# Patient Record
Sex: FEMALE | Race: WHITE | NOT HISPANIC OR LATINO | Employment: FULL TIME | URBAN - METROPOLITAN AREA
[De-identification: names, ages, dates, MRNs, and addresses within clinical notes are randomized per-mention and may not be internally consistent; named-entity substitution may affect disease eponyms.]

---

## 2017-01-12 ENCOUNTER — OFFICE VISIT (OUTPATIENT)
Dept: MEDICAL GROUP | Facility: MEDICAL CENTER | Age: 42
End: 2017-01-12
Payer: COMMERCIAL

## 2017-01-12 ENCOUNTER — PATIENT MESSAGE (OUTPATIENT)
Dept: MEDICAL GROUP | Facility: MEDICAL CENTER | Age: 42
End: 2017-01-12

## 2017-01-12 VITALS
HEART RATE: 76 BPM | RESPIRATION RATE: 16 BRPM | DIASTOLIC BLOOD PRESSURE: 62 MMHG | TEMPERATURE: 98.4 F | WEIGHT: 177 LBS | OXYGEN SATURATION: 95 % | BODY MASS INDEX: 25.34 KG/M2 | HEIGHT: 70 IN | SYSTOLIC BLOOD PRESSURE: 112 MMHG

## 2017-01-12 DIAGNOSIS — J02.9 SORE THROAT: ICD-10-CM

## 2017-01-12 DIAGNOSIS — J00 ACUTE NASOPHARYNGITIS: ICD-10-CM

## 2017-01-12 LAB
INT CON NEG: NEGATIVE
INT CON POS: POSITIVE
S PYO AG THROAT QL: NEGATIVE

## 2017-01-12 PROCEDURE — 87880 STREP A ASSAY W/OPTIC: CPT | Performed by: FAMILY MEDICINE

## 2017-01-12 PROCEDURE — 99214 OFFICE O/P EST MOD 30 MIN: CPT | Performed by: FAMILY MEDICINE

## 2017-01-12 RX ORDER — IPRATROPIUM BROMIDE 42 UG/1
2 SPRAY, METERED NASAL 3 TIMES DAILY
Qty: 1 BOTTLE | Refills: 1 | Status: SHIPPED | OUTPATIENT
Start: 2017-01-12 | End: 2017-04-12

## 2017-01-12 NOTE — MR AVS SNAPSHOT
"Marcela Tiwari   2017 4:40 PM   Office Visit   MRN: [id number]    Department:  [facility]   Dept Phone:  [phone]    Description:  Female : 1975   Provider:  Ivan Armstrong M.D.           Reason for Visit     Pharyngitis           Allergies as of 2017     Allergen Noted Reactions    Seasonal 2015   Runny Nose    Seasonal allergies      You were diagnosed with     Sore throat   [[id number]]         Vital Signs     Blood Pressure Pulse Temperature Respirations Height Weight    112/62 mmHg 76 36.9 °C (98.4 °F) 16 1.778 m (5' 10\") 80.287 kg (177 lb)    Body Mass Index Oxygen Saturation Breastfeeding? 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   10/1/2014 - Present    S/P BKA (below knee amputation) unilateral (HCC) Z89.519   10/1/2014 - Present    Migraines G43.909   10/1/2014 - Present    Chronic rhinitis J31.0   2015 - Present      Health Maintenance        Date Due Completion Dates    IMM DTaP/Tdap/Td Vaccine (1 - Tdap) 1996 ---    IMM INFLUENZA (1) 2016 ---    PAP SMEAR 3/7/2019 3/7/2016, 3/7/2016, 10/23/2014            Results     POCT Rapid Strep A      Component    Rapid Strep Screen    NEGATIVE    Internal Control Positive    Positive    Internal Control Negative    Negative                        Current Immunizations     No immunizations on file.      Below and/or attached are the medications your provider expects you to take. Review all of your home medications and newly ordered medications with your provider and/or pharmacist. Follow medication instructions as directed by your provider and/or pharmacist. Please keep your medication list with you and share with your provider. Update the information " when medications are discontinued, doses are changed, or new medications (including over-the-counter products) are added; and carry medication information at all times in the event of emergency situations     Allergies:  SEASONAL - Runny Nose               Medications  Valid as of: January 12, 2017 -  4:48 PM    Generic Name Brand Name Tablet Size Instructions for use    Eszopiclone (Tab) LUNESTA 1 MG Take 1 Tab by mouth at bedtime as needed for Insomnia.        Fluticasone Propionate (Suspension) FLONASE 50 MCG/ACT Spray 2 Sprays in nose every day.        Montelukast Sodium (Tab) SINGULAIR 10 MG Take 1 Tab by mouth every day.        SUMAtriptan Succinate (Tab) IMITREX 50 MG Take 1 Tab by mouth 1 time daily as needed for Migraine.        .                 Medicines prescribed today were sent to:     [facility]ACY #[id number] - [place], [place] - [address]    [address] [place] [address]    Phone: [phone] Fax: [phone]    Open 24 Hours?: No      Medication refill instructions:       If your prescription bottle indicates you have medication refills left, it is not necessary to call your provider’s office. Please contact your pharmacy and they will refill your medication.    If your prescription bottle indicates you do not have any refills left, you may request refills at any time through one of the following ways: The online [employer] system (except Urgent Care), by calling your provider’s office, or by asking your pharmacy to contact your provider’s office with a refill request. Medication refills are processed only during regular business hours and may not be available until the next business day. Your provider may request additional information or to have a follow-up visit with you prior to refilling your medication.   *Please Note: Medication refills are assigned a new Rx number when refilled electronically. Your pharmacy may indicate that no refills were authorized even though a new prescription for the  same medication is available at the pharmacy. Please request the medicine by name with the pharmacy before contacting your provider for a refill.           MyChart Access Code: Activation code not generated  Current MyChart Status: Active

## 2017-01-12 NOTE — TELEPHONE ENCOUNTER
From: Marcela Tiwari  To: Ivan Armstrong M.D.  Sent: 1/12/2017 11:30 AM PST  Subject: Procedure Question    Dear Dr. Armstrong,     I was wondering if you are in office today and if walk ins are welcomed . I have been sick for the 2nd day. No fever but my throat hurts so much . Plus , headache and overall body aches. I am vivienne to have opportunity to stay home but i was wondering if i need to get to the office and get checked. Please , let me know . Sincerely. Marcela

## 2017-01-13 NOTE — PROGRESS NOTES
"Chief Complaint   Patient presents with   • Pharyngitis       HPI: 2 days of illness including: rhinorrhea, started with sore throat, facial pain, diarrhea, positive clearing throat Mucus is: thin  Symptoms negative for fever, chills, night sweats, cough, chest pain, wheezing, vomiting, ear pain   Similarly ill exposures: yes, works at the [employer]  Medical history negative for recurrent OM, tonsillitis, sinusitis, asthma, bronchitis, pneumonia.   She  reports that she has never smoked. She has never used smokeless tobacco.  Patient has been drinking tea with honey and taking vitamin C    Strep negative.    ROS  No fever. No productive cough. No skin rashes.  No N/V/D    Blood pressure 112/62, pulse 76, temperature 36.9 °C (98.4 °F), resp. rate 16, height 1.778 m (5' 10\"), weight 80.287 kg (177 lb), SpO2 95 %, not currently breastfeeding.  Gen: alert, No conversational dyspnea. No audible wheeze, nontoxic.  PERRL, conjunctiva slightly injected. No photophobia. No eye discharge.  Ears: normal pinnae. TM: normal  Nares patent with thin mucus.  Sinuses non tender over maxillary / frontal sinuses  Throat: erythematous injection. No exudate.   Neck: supple, with  no adenopathy  Lungs:  clear to auscultation, no wheezing, no retraction, no stridor, good air exchange.   Abdomen soft. No HSM.   Skin: warm and dry. No rash.    A/P    1. Acute nasopharyngitis  Mild viral URI. Advised rest, hydration, vitamin C, zinc.  Prescription for ipratropium nasal to help with symptom control.  - ipratropium (ATROVENT) 0.06 % Solution; Spray 2 Sprays in nose 3 times a day.  Dispense: 1 Bottle; Refill: 1    2. Sore throat  Negative for strep.  - POCT Rapid Strep A        Followup for worsening symptoms, difficulty breathing, lack of expected recovery, or should new symptoms or problems arise.    "

## 2017-01-26 ENCOUNTER — PATIENT MESSAGE (OUTPATIENT)
Dept: MEDICAL GROUP | Facility: MEDICAL CENTER | Age: 42
End: 2017-01-26

## 2017-01-26 DIAGNOSIS — R10.9 ABDOMINAL PAIN, UNSPECIFIED LOCATION: ICD-10-CM

## 2017-03-23 ENCOUNTER — GYNECOLOGY VISIT (OUTPATIENT)
Dept: OBGYN | Facility: MEDICAL CENTER | Age: 42
End: 2017-03-23
Payer: COMMERCIAL

## 2017-03-23 ENCOUNTER — HOSPITAL ENCOUNTER (OUTPATIENT)
Facility: MEDICAL CENTER | Age: 42
End: 2017-03-23
Attending: OBSTETRICS & GYNECOLOGY
Payer: COMMERCIAL

## 2017-03-23 VITALS
SYSTOLIC BLOOD PRESSURE: 82 MMHG | WEIGHT: 178 LBS | BODY MASS INDEX: 25.48 KG/M2 | DIASTOLIC BLOOD PRESSURE: 54 MMHG | HEIGHT: 70 IN

## 2017-03-23 DIAGNOSIS — Z12.4 ROUTINE CERVICAL SMEAR: ICD-10-CM

## 2017-03-23 DIAGNOSIS — Z11.3 SCREENING EXAMINATION FOR VENEREAL DISEASE: ICD-10-CM

## 2017-03-23 DIAGNOSIS — Z01.419 WELL WOMAN EXAM: ICD-10-CM

## 2017-03-23 DIAGNOSIS — Z11.51 SPECIAL SCREENING EXAMINATION FOR HUMAN PAPILLOMAVIRUS (HPV): ICD-10-CM

## 2017-03-23 PROCEDURE — 99395 PREV VISIT EST AGE 18-39: CPT | Performed by: OBSTETRICS & GYNECOLOGY

## 2017-03-23 PROCEDURE — 88175 CYTOPATH C/V AUTO FLUID REDO: CPT

## 2017-03-23 PROCEDURE — 87491 CHLMYD TRACH DNA AMP PROBE: CPT

## 2017-03-23 PROCEDURE — 87624 HPV HI-RISK TYP POOLED RSLT: CPT

## 2017-03-23 PROCEDURE — 87591 N.GONORRHOEAE DNA AMP PROB: CPT

## 2017-03-23 NOTE — PROGRESS NOTES
"Marcela Tiwari is a 41 y.o. y.o. female who presents for her Gynecologic Exam        HPI Comments: Pt presents for well woman exam. Pt has complaints of being tired and very fatigue. No LMP recorded.  .  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     Past Surgical History   Procedure Laterality Date   • Bunionectomy     • Amputation, below the knee  2011     Seasonal  Social History     Social History   • Marital Status: Single     Spouse Name: N/A   • Number of Children: N/A   • Years of Education: N/A     Social History Main Topics   • Smoking status: Never Smoker    • Smokeless tobacco: Never Used   • Alcohol Use: 2.0 oz/week     4 Glasses of wine per week   • Drug Use: No   • Sexual Activity:     Partners: Male     Other Topics Concern   • None     Social History Narrative     Family History   Problem Relation Age of Onset   • Other Mother 48     Parkinson's   • Cancer Paternal Grandmother      breast cancer     Medications:   Current Outpatient Prescriptions Ordered in [facility]   Medication Sig Dispense Refill   • ipratropium (ATROVENT) 0.06 % Solution Spray 2 Sprays in nose 3 times a day. 1 Bottle 1   • eszopiclone (LUNESTA) 1 MG Tab tablet Take 1 Tab by mouth at bedtime as needed for Insomnia. 30 Tab 1   • montelukast (SINGULAIR) 10 MG Tab Take 1 Tab by mouth every day. 30 Tab 1   • fluticasone (FLONASE) 50 MCG/ACT nasal spray Spray 2 Sprays in nose every day. 16 g 5   • sumatriptan (IMITREX) 50 MG Tab Take 1 Tab by mouth 1 time daily as needed for Migraine. 10 Tab 5     No current Epic-ordered facility-administered medications on file.          Objective:   Vital measurements:  Blood pressure 82/54, height 1.778 m (5' 10\"), weight 80.74 kg (178 " lb).  Body mass index is 25.54 kg/(m^2). (Goal BM I>18 <25)    Physical Exam   Nursing note and vitals reviewed.  Constitutional: She is oriented to person, place, and time. She appears well-developed and well-nourished. No distress.     HEENT:   Head: Normocephalic and atraumatic.   Right Ear: External ear normal.   Left Ear: External ear normal.   Nose: Nose normal.   Eyes: Conjunctivae and EOM are normal. Pupils are equal, round, and reactive to light. No scleral icterus.     Neck: Normal range of motion. Neck supple. No tracheal deviation present. No thyromegaly present.     Pulmonary/Chest: Effort normal and breath sounds normal. No respiratory distress. She has no wheezes. She has no rales. She exhibits no tenderness.     Cardiovascular: Regular, rate and rhythm. No JVD.    Abdominal: Soft. Bowel sounds are normal. She exhibits no distension and no mass. No tenderness. She has no rebound and no guarding.     Breast:  Symmetrical, normal consistency without masses., No dimpling or skin changes, Normal nipples without discharge, no axillary lymphadenopathy, negative    Genitourinary:  Pelvic exam was performed with patient supine.  External genitalia with no abnormal pigmentation, labial fusion,rash, tenderness, lesion or injury to the labia bilaterally.  Vagina is moist with no lesions, foul discharge, erythema, tenderness or bleeding. No foreign body around the vagina or signs of injury.   Cervix exhibits no motion tenderness, no discharge and no friability.   Uterus is AV not deviated, not enlarged, not fixed and not tender.  Right adnexum displays no mass, no tenderness and no fullness. Left adnexum displays no mass, no tenderness and no fullness.     Musculoskeletal: Normal range of motion. She exhibits no edema and no tenderness.     Lymphadenopathy: She has no cervical adenopathy.     Neurological: She is alert and oriented to person, place, and time. She exhibits normal muscle tone.     Skin: Skin is  warm and dry. No rash noted. She is not diaphoretic. No erythema. No pallor.     Psychiatric: She has a normal mood and affect. Her behavior is normal. Judgment and thought content normal.               Assessment:     1. Well woman exam  THINPREP PAP W/HPV AND CTNG   2. Routine cervical smear  THINPREP PAP W/HPV AND CTNG   3. Special screening examination for human papillomavirus (HPV)  THINPREP PAP W/HPV AND CTNG   4. Screening examination for venereal disease  THINPREP PAP W/HPV AND CTNG         Plan:   Pap and physical exam performed  Monthly SBE.  Counseling: breast self exam, mammography screening, STD prevention, HIV risk factors and prevention and menopause  Encourage exercise and proper diet.  Mammograms starting @ age 40 annually.  See medications and orders placed in encounter report.  Pt needs to make a referral to see primary care for fatigue with h/o cancer.

## 2017-03-23 NOTE — MR AVS SNAPSHOT
"        Mracela Tiwari   3/23/2017 9:00 AM   Gynecology Visit   MRN: [id number]    Department:  [facility]   Dept Phone:  [phone]    Description:  Female : 1975   Provider:  Grace Andre M.D.           Reason for Visit     Gynecologic Exam Annual exam       Allergies as of 3/23/2017     Allergen Noted Reactions    Seasonal 2015   Runny Nose    Seasonal allergies      You were diagnosed with     Well woman exam   [650636]       Routine cervical smear   [288829]       Special screening examination for human papillomavirus (HPV)   [V73.81.ICD-9-CM]       Screening examination for venereal disease   [V74.5.ICD-9-CM]         Vital Signs     Blood Pressure Height Weight Body Mass Index Smoking Status       82/54 mmHg 1.778 m (5' 10\") 80.74 kg (178 lb) 25.54 kg/m2 Never Smoker        Basic Information     Date Of Birth Sex Race Ethnicity Preferred Language    1975 Female White Non- English      Problem List              ICD-10-CM Priority Class Noted - Resolved    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      Below and/or attached are the medications your provider expects you to take. Review all of your home medications and newly ordered medications with your provider and/or pharmacist. Follow medication instructions as directed by your provider and/or pharmacist. Please keep your medication list with you and share with your provider. Update the information when medications are discontinued, doses are changed, or new medications " (including over-the-counter products) are added; and carry medication information at all times in the event of emergency situations     Allergies:  SEASONAL - Runny Nose               Medications  Valid as of: March 23, 2017 -  9:53 AM    Generic Name Brand Name Tablet Size Instructions for use    Eszopiclone (Tab) LUNESTA 1 MG Take 1 Tab by mouth at bedtime as needed for Insomnia.        Fluticasone Propionate (Suspension) FLONASE 50 MCG/ACT Spray 2 Sprays in nose every day.        Ipratropium Bromide (Solution) ATROVENT 0.06 % Spray 2 Sprays in nose 3 times a day.        Montelukast Sodium (Tab) SINGULAIR 10 MG Take 1 Tab by mouth every day.        SUMAtriptan Succinate (Tab) IMITREX 50 MG Take 1 Tab by mouth 1 time daily as needed for Migraine.        .                 Medicines prescribed today were sent to:     [facility]ARMACY #[id number] - [place], [place] - [address]    [address] [place] [address]    Phone: [phone] Fax: [phone]    Open 24 Hours?: No      Medication refill instructions:       If your prescription bottle indicates you have medication refills left, it is not necessary to call your provider’s office. Please contact your pharmacy and they will refill your medication.    If your prescription bottle indicates you do not have any refills left, you may request refills at any time through one of the following ways: The online [employer] system (except Urgent Care), by calling your provider’s office, or by asking your pharmacy to contact your provider’s office with a refill request. Medication refills are processed only during regular business hours and may not be available until the next business day. Your provider may request additional information or to have a follow-up visit with you prior to refilling your medication.   *Please Note: Medication refills are assigned a new Rx number when refilled electronically. Your pharmacy may indicate that no refills were authorized even though a new  prescription for the same medication is available at the pharmacy. Please request the medicine by name with the pharmacy before contacting your provider for a refill.           MyChart Access Code: Activation code not generated  Current MyChart Status: Active

## 2017-03-24 LAB
C TRACH DNA GENITAL QL NAA+PROBE: NEGATIVE
CYTOLOGY REG CYTOL: NORMAL
HPV HR 12 DNA CVX QL NAA+PROBE: NEGATIVE
HPV16 DNA SPEC QL NAA+PROBE: NEGATIVE
HPV18 DNA SPEC QL NAA+PROBE: NEGATIVE
N GONORRHOEA DNA GENITAL QL NAA+PROBE: NEGATIVE
SPECIMEN SOURCE: NORMAL
SPECIMEN SOURCE: NORMAL

## 2017-03-29 ENCOUNTER — PATIENT MESSAGE (OUTPATIENT)
Dept: MEDICAL GROUP | Facility: MEDICAL CENTER | Age: 42
End: 2017-03-29

## 2017-03-29 DIAGNOSIS — J30.1 SEASONAL ALLERGIC RHINITIS DUE TO POLLEN: ICD-10-CM

## 2017-03-29 DIAGNOSIS — Z00.00 ANNUAL PHYSICAL EXAM: ICD-10-CM

## 2017-04-05 ENCOUNTER — HOSPITAL ENCOUNTER (OUTPATIENT)
Dept: LAB | Facility: MEDICAL CENTER | Age: 42
End: 2017-04-05
Attending: FAMILY MEDICINE
Payer: COMMERCIAL

## 2017-04-05 DIAGNOSIS — Z00.00 ANNUAL PHYSICAL EXAM: ICD-10-CM

## 2017-04-05 LAB — GFR SERPL CREATININE-BSD FRML MDRD: >60 ML/MIN/1.73 M 2

## 2017-04-05 PROCEDURE — 82607 VITAMIN B-12: CPT

## 2017-04-05 PROCEDURE — 80053 COMPREHEN METABOLIC PANEL: CPT

## 2017-04-05 PROCEDURE — 80061 LIPID PANEL: CPT

## 2017-04-05 PROCEDURE — 85025 COMPLETE CBC W/AUTO DIFF WBC: CPT

## 2017-04-05 PROCEDURE — 36415 COLL VENOUS BLD VENIPUNCTURE: CPT

## 2017-04-05 PROCEDURE — 83550 IRON BINDING TEST: CPT

## 2017-04-05 PROCEDURE — 84443 ASSAY THYROID STIM HORMONE: CPT

## 2017-04-05 PROCEDURE — 82306 VITAMIN D 25 HYDROXY: CPT

## 2017-04-05 PROCEDURE — 83540 ASSAY OF IRON: CPT

## 2017-04-08 LAB
25(OH)D3 SERPL-MCNC: 21 NG/ML (ref 30–100)
ALBUMIN SERPL BCP-MCNC: 3.3 G/DL (ref 3.2–4.9)
ALBUMIN/GLOB SERPL: 1.1 G/DL
ALP SERPL-CCNC: 44 U/L (ref 30–99)
ALT SERPL-CCNC: 25 U/L (ref 2–50)
ANION GAP SERPL CALC-SCNC: 8 MMOL/L (ref 0–11.9)
AST SERPL-CCNC: 20 U/L (ref 12–45)
BASOPHILS # BLD AUTO: 0.4 % (ref 0–1.8)
BASOPHILS # BLD: 0.03 K/UL (ref 0–0.12)
BILIRUB SERPL-MCNC: 0.5 MG/DL (ref 0.1–1.5)
BUN SERPL-MCNC: 9 MG/DL (ref 8–22)
CALCIUM SERPL-MCNC: 8.4 MG/DL (ref 8.4–10.2)
CHLORIDE SERPL-SCNC: 100 MMOL/L (ref 96–112)
CHOLEST SERPL-MCNC: 174 MG/DL (ref 100–199)
CO2 SERPL-SCNC: 26 MMOL/L (ref 20–33)
CREAT SERPL-MCNC: 0.62 MG/DL (ref 0.5–1.4)
EOSINOPHIL # BLD AUTO: 0.17 K/UL (ref 0–0.51)
EOSINOPHIL NFR BLD: 2 % (ref 0–6.9)
ERYTHROCYTE [DISTWIDTH] IN BLOOD BY AUTOMATED COUNT: 43.9 FL (ref 35.9–50)
GLOBULIN SER CALC-MCNC: 3.1 G/DL (ref 1.9–3.5)
GLUCOSE SERPL-MCNC: 80 MG/DL (ref 65–99)
HCT VFR BLD AUTO: 33.9 % (ref 37–47)
HDLC SERPL-MCNC: 76 MG/DL
HGB BLD-MCNC: 11.6 G/DL (ref 12–16)
IMM GRANULOCYTES # BLD AUTO: 0.03 K/UL (ref 0–0.11)
IMM GRANULOCYTES NFR BLD AUTO: 0.4 % (ref 0–0.9)
IRON SATN MFR SERPL: 44 % (ref 15–55)
IRON SERPL-MCNC: 154 UG/DL (ref 40–170)
LDLC SERPL CALC-MCNC: 81 MG/DL
LYMPHOCYTES # BLD AUTO: 1.51 K/UL (ref 1–4.8)
LYMPHOCYTES NFR BLD: 17.7 % (ref 22–41)
MCH RBC QN AUTO: 31.9 PG (ref 27–33)
MCHC RBC AUTO-ENTMCNC: 34.2 G/DL (ref 33.6–35)
MCV RBC AUTO: 93.1 FL (ref 81.4–97.8)
MONOCYTES # BLD AUTO: 0.51 K/UL (ref 0–0.85)
MONOCYTES NFR BLD AUTO: 6 % (ref 0–13.4)
NEUTROPHILS # BLD AUTO: 6.28 K/UL (ref 2–7.15)
NEUTROPHILS NFR BLD: 73.5 % (ref 44–72)
NRBC # BLD AUTO: 0 K/UL
NRBC BLD AUTO-RTO: 0 /100 WBC
PLATELET # BLD AUTO: 227 K/UL (ref 164–446)
PMV BLD AUTO: 9.6 FL (ref 9–12.9)
POTASSIUM SERPL-SCNC: 3.6 MMOL/L (ref 3.6–5.5)
PROT SERPL-MCNC: 6.4 G/DL (ref 6–8.2)
RBC # BLD AUTO: 3.64 M/UL (ref 4.2–5.4)
SODIUM SERPL-SCNC: 134 MMOL/L (ref 135–145)
TIBC SERPL-MCNC: 347 UG/DL (ref 250–450)
TRIGL SERPL-MCNC: 83 MG/DL (ref 0–149)
TSH SERPL DL<=0.005 MIU/L-ACNC: 1.39 UIU/ML (ref 0.35–5.5)
VIT B12 SERPL-MCNC: 244 PG/ML (ref 211–911)
WBC # BLD AUTO: 8.5 K/UL (ref 4.8–10.8)

## 2017-04-12 ENCOUNTER — OFFICE VISIT (OUTPATIENT)
Dept: MEDICAL GROUP | Facility: MEDICAL CENTER | Age: 42
End: 2017-04-12
Payer: COMMERCIAL

## 2017-04-12 VITALS
DIASTOLIC BLOOD PRESSURE: 64 MMHG | TEMPERATURE: 98.1 F | HEIGHT: 70 IN | RESPIRATION RATE: 16 BRPM | SYSTOLIC BLOOD PRESSURE: 100 MMHG | OXYGEN SATURATION: 98 % | WEIGHT: 179 LBS | HEART RATE: 74 BPM | BODY MASS INDEX: 25.62 KG/M2

## 2017-04-12 DIAGNOSIS — R10.31 RIGHT GROIN PAIN: ICD-10-CM

## 2017-04-12 DIAGNOSIS — E53.8 VITAMIN B 12 DEFICIENCY: ICD-10-CM

## 2017-04-12 DIAGNOSIS — J31.0 CHRONIC RHINITIS: ICD-10-CM

## 2017-04-12 DIAGNOSIS — D64.9 NORMOCHROMIC ANEMIA: ICD-10-CM

## 2017-04-12 DIAGNOSIS — G43.009 MIGRAINE WITHOUT AURA AND WITHOUT STATUS MIGRAINOSUS, NOT INTRACTABLE: ICD-10-CM

## 2017-04-12 PROBLEM — Z78.0 POSTMENOPAUSAL: Status: ACTIVE | Noted: 2017-04-12

## 2017-04-12 PROCEDURE — 99214 OFFICE O/P EST MOD 30 MIN: CPT | Performed by: FAMILY MEDICINE

## 2017-04-12 RX ORDER — SUMATRIPTAN 50 MG/1
50 TABLET, FILM COATED ORAL
Qty: 10 TAB | Refills: 5 | Status: ON HOLD | OUTPATIENT
Start: 2017-04-12 | End: 2017-10-11

## 2017-04-12 NOTE — PROGRESS NOTES
"Subjective:   Marcela Tiwari is a 41 y.o. female here today for migraines, right groin pain, anemia, rhinitis    Migraines  Has been having chronic and persistent migraines. Had 2 weeks of chronic migraines.  Lack of sleep triggers headache. Possibly related to allergies.  She has an appointment with an allergist in a few weeks.    Right groin pain  Has been present for 2 weeks. Does yoga 3 times a week, but no known injuries recently.  No trauma, no fall.  More painful with walking.  Slowly improving on its own.    Normochromic anemia  Patient has never had an issue with anemia in the past. She is postmenopausal, so she is not having periods. She denies any blood loss. Iron levels were normal.    Chronic rhinitis  Patient has allergic rhinitis and is taking Claritin and Flonase daily. She states that symptoms are not improving and she feels like she is immune to Claritin now. She was prescribed Singulair but never started it. She has an appointment with an allergist in a few weeks.  She uses saline every morning.  Patient does complain of epistaxis in the morning.           Current medicines (including changes today)  Current Outpatient Prescriptions   Medication Sig Dispense Refill   • sumatriptan (IMITREX) 50 MG Tab Take 1 Tab by mouth 1 time daily as needed for Migraine. 10 Tab 5   • eszopiclone (LUNESTA) 1 MG Tab tablet Take 1 Tab by mouth at bedtime as needed for Insomnia. 30 Tab 1   • montelukast (SINGULAIR) 10 MG Tab Take 1 Tab by mouth every day. 30 Tab 1   • fluticasone (FLONASE) 50 MCG/ACT nasal spray Spray 2 Sprays in nose every day. 16 g 5     No current facility-administered medications for this visit.     She  has a past medical history of Headache, classical migraine and Cancer (CMS-[facility]) (2006).    ROS   No chest pain, no shortness of breath       Objective:     Blood pressure 100/64, pulse 74, temperature 36.7 °C (98.1 °F), resp. rate 16, height 1.778 m (5' 10\"), weight 81.194 kg (179 " lb), SpO2 98 %. Body mass index is 25.68 kg/(m^2).   Physical Exam:  Constitutional: Alert, no distress.  Skin: Warm, dry, good turgor, no rashes in visible areas.  Eye: Equal, round and reactive, conjunctiva clear, lids normal.  ENMT: Right nasal septum with healing ulceration with small eschar. No signs of active bleeding.  MSK: Minimal tenderness to palpation over right hip joint.  Psych: Alert and oriented x3, normal affect and mood.        Assessment and Plan:   The following treatment plan was discussed    1. Normochromic anemia  Possibly related to bone marrow suppression versus chronic inflammation versus aberrant lab result.  We will recheck labs in one month, check reticulocyte count, check inflammatory markers. Mychart patient with results.  - CBC WITH DIFFERENTIAL; Future  - CRP QUANTITIVE (NON-CARDIAC); Future  - WESTERGREN SED RATE; Future  - RETICULOCYTES COUNT; Future    2. Migraine without aura and without status migrainosus, not intractable  Controlled. Continue current medication.  - sumatriptan (IMITREX) 50 MG Tab; Take 1 Tab by mouth 1 time daily as needed for Migraine.  Dispense: 10 Tab; Refill: 5    3. Right groin pain  Most likely musculoskeletal. Advised rest and monitor.    4. Vitamin B 12 deficiency  Advise vitamin B-12 1000 µg daily. Recheck vitamin B-12 in one month.  - VITAMIN B12; Future    5. Chronic rhinitis  Advise patient to continue Flonase, Claritin and to add Singulair for the next few weeks. Follow-up with allergist in 3 weeks.      Followup: Return if symptoms worsen or fail to improve.

## 2017-04-12 NOTE — ASSESSMENT & PLAN NOTE
Patient has allergic rhinitis and is taking Claritin and Flonase daily. She states that symptoms are not improving and she feels like she is immune to Claritin now. She was prescribed Singulair but never started it. She has an appointment with an allergist in a few weeks.  She uses saline every morning.  Patient does complain of epistaxis in the morning.

## 2017-04-12 NOTE — ASSESSMENT & PLAN NOTE
Has been having chronic and persistent migraines. Had 2 weeks of chronic migraines.  Lack of sleep triggers headache. Possibly related to allergies.  She has an appointment with an allergist in a few weeks.

## 2017-04-12 NOTE — ASSESSMENT & PLAN NOTE
Has been present for 2 weeks. Does yoga 3 times a week, but no known injuries recently.  No trauma, no fall.  More painful with walking.  Slowly improving on its own.

## 2017-04-12 NOTE — MR AVS SNAPSHOT
"        Marcela Tiwari   2017 8:40 AM   Office Visit   MRN: [id number]    Department:  [facility]   Dept Phone:  [phone]    Description:  Female : 1975   Provider:  Ivan Armstrong M.D.           Reason for Visit     Follow-Up     Results           Allergies as of 2017     Allergen Noted Reactions    Seasonal 2015   Runny Nose    Seasonal allergies      You were diagnosed with     Normochromic anemia   [496735]       Migraine without aura and without status migrainosus, not intractable   [832021]       Right groin pain   [015808]       Vitamin B 12 deficiency   [274981]       Chronic rhinitis   [472.0.ICD-9-CM]         Vital Signs     Blood Pressure Pulse Temperature Respirations Height Weight    100/64 mmHg 74 36.7 °C (98.1 °F) 16 1.778 m (5' 10\") 81.194 kg (179 lb)    Body Mass Index Oxygen Saturation Smoking Status             25.68 kg/m2 98% Never Smoker          Basic Information     Date Of Birth Sex Race Ethnicity Preferred Language    1975 Female White Non- English      Problem List              ICD-10-CM Priority Class Noted - Resolved    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      Below and/or attached are the medications your provider expects you to take. Review all of your home medications and newly ordered medications with your provider and/or pharmacist. Follow " medication instructions as directed by your provider and/or pharmacist. Please keep your medication list with you and share with your provider. Update the information when medications are discontinued, doses are changed, or new medications (including over-the-counter products) are added; and carry medication information at all times in the event of emergency situations     Allergies:  SEASONAL - Runny Nose               Medications  Valid as of: April 12, 2017 -  9:36 AM    Generic Name Brand Name Tablet Size Instructions for use    Eszopiclone (Tab) LUNESTA 1 MG Take 1 Tab by mouth at bedtime as needed for Insomnia.        Fluticasone Propionate (Suspension) FLONASE 50 MCG/ACT Spray 2 Sprays in nose every day.        Montelukast Sodium (Tab) SINGULAIR 10 MG Take 1 Tab by mouth every day.        SUMAtriptan Succinate (Tab) IMITREX 50 MG Take 1 Tab by mouth 1 time daily as needed for Migraine.        .                 Medicines prescribed today were sent to:     [id number] - [place], [place] - [address]    [address] [place] [place] [address]    Phone: [phone] Fax: [phone]    Open 24 Hours?: No    [id number] - [place], [place] - [address]    [address] [place] [address]    Phone: [phone] Fax: [phone]    Open 24 Hours?: No      Medication refill instructions:       If your prescription bottle indicates you have medication refills left, it is not necessary to call your provider’s office. Please contact your pharmacy and they will refill your medication.    If your prescription bottle indicates you do not have any refills left, you may request refills at any time through one of the following ways: The online [employer] system (except Urgent Care), by calling your provider’s office, or by asking your pharmacy to contact your provider’s office with a refill request. Medication refills are processed only during regular business hours and may not be available until the next  business day. Your provider may request additional information or to have a follow-up visit with you prior to refilling your medication.   *Please Note: Medication refills are assigned a new Rx number when refilled electronically. Your pharmacy may indicate that no refills were authorized even though a new prescription for the same medication is available at the pharmacy. Please request the medicine by name with the pharmacy before contacting your provider for a refill.        Your To Do List     Future Labs/Procedures Complete By Expires    CBC WITH DIFFERENTIAL  As directed 4/13/2018    CRP QUANTITIVE (NON-CARDIAC)  As directed 4/13/2018    RETICULOCYTES COUNT  As directed 4/12/2018    VITAMIN B12  As directed 4/13/2018    WESTERGREN SED RATE  As directed 4/13/2018         [employer] Access Code: Activation code not generated  Current [employer] Status: Active

## 2017-04-12 NOTE — ASSESSMENT & PLAN NOTE
Patient has never had an issue with anemia in the past. She is postmenopausal, so she is not having periods. She denies any blood loss. Iron levels were normal.

## 2017-04-20 ENCOUNTER — PATIENT MESSAGE (OUTPATIENT)
Dept: MEDICAL GROUP | Facility: MEDICAL CENTER | Age: 42
End: 2017-04-20

## 2017-04-20 RX ORDER — AZELASTINE 1 MG/ML
1 SPRAY, METERED NASAL 2 TIMES DAILY
Qty: 30 ML | Refills: 2 | Status: ON HOLD | OUTPATIENT
Start: 2017-04-20 | End: 2017-10-11

## 2017-04-21 DIAGNOSIS — R10.30 GROIN PAIN, UNSPECIFIED LATERALITY: ICD-10-CM

## 2017-04-21 NOTE — TELEPHONE ENCOUNTER
From: Jennyfer Tiwari  To: Ivan Armstrong M.D.  Sent: 4/20/2017 9:30 AM PDT  Subject: Non-Urgent Medical Question    Thank you Dr. Armstrong. I spray flonase every day for last few years. Does the other spray addictive ?  ----- Message -----  From: Ivan Armstrong M.D.  Sent: 4/20/2017 7:54 AM PDT  To: Jennyfer Tiwari  Subject: RE: Non-Urgent Medical Question    Continue taking the flonase because it can take a couple weeks to work. I will send in a prescription for Astelin, another nose spray that you should use with the flonase.    Dr. Armstrong      ----- Message -----   From: JENNYFER TIWARI   Sent: 4/20/2017 6:55 AM PDT   To: Ivan Armstrong M.D.  Subject: Non-Urgent Medical Question    ,I feel like Fkpckprtkz21 mg stopped working or the allergies are that bad in our area right now. I tried Montelukast 10 mg in addition for 4 days but I experienced extreme fatigue. Maybe it was a coincidence. Fluticasone spray doesnt make too much difference either. I am seeing allergist on 05/15. Is there anything else I should try before the appointment? My nostril pathways got close and makes it hard to breath through the nose but I need to breath . Another nasal spray?

## 2017-04-24 ENCOUNTER — TELEPHONE (OUTPATIENT)
Dept: OBGYN | Facility: MEDICAL CENTER | Age: 42
End: 2017-04-24

## 2017-04-24 ENCOUNTER — PATIENT MESSAGE (OUTPATIENT)
Dept: MEDICAL GROUP | Facility: MEDICAL CENTER | Age: 42
End: 2017-04-24

## 2017-04-24 ENCOUNTER — HOSPITAL ENCOUNTER (OUTPATIENT)
Dept: LAB | Facility: MEDICAL CENTER | Age: 42
End: 2017-04-24
Attending: OBSTETRICS & GYNECOLOGY
Payer: COMMERCIAL

## 2017-04-24 DIAGNOSIS — Z34.90 PREGNANCY, UNSPECIFIED GESTATIONAL AGE: ICD-10-CM

## 2017-04-24 LAB — B-HCG SERPL-ACNC: ABNORMAL MIU/ML (ref 0–10)

## 2017-04-24 PROCEDURE — 36415 COLL VENOUS BLD VENIPUNCTURE: CPT

## 2017-04-24 PROCEDURE — 84702 CHORIONIC GONADOTROPIN TEST: CPT

## 2017-04-24 NOTE — TELEPHONE ENCOUNTER
Pt coming in to office states that she had a positive home pregnancy test and would like blood work to confirm. Orders placed and pt to go to lab to have done.

## 2017-04-25 ENCOUNTER — TELEPHONE (OUTPATIENT)
Dept: OBGYN | Facility: CLINIC | Age: 42
End: 2017-04-25

## 2017-04-25 ENCOUNTER — HOSPITAL ENCOUNTER (OUTPATIENT)
Dept: RADIOLOGY | Facility: MEDICAL CENTER | Age: 42
End: 2017-04-25
Attending: OBSTETRICS & GYNECOLOGY
Payer: COMMERCIAL

## 2017-04-25 DIAGNOSIS — R10.30 GROIN PAIN, UNSPECIFIED LATERALITY: ICD-10-CM

## 2017-04-25 PROCEDURE — 76815 OB US LIMITED FETUS(S): CPT

## 2017-04-25 NOTE — TELEPHONE ENCOUNTER
----- Message from Grace Andre M.D. sent at 4/25/2017 12:00 PM PDT -----  Pelvic Rest d/t placental location. She needs to start prenatal care ASAP

## 2017-04-26 ENCOUNTER — PATIENT MESSAGE (OUTPATIENT)
Dept: MEDICAL GROUP | Facility: MEDICAL CENTER | Age: 42
End: 2017-04-26

## 2017-04-26 DIAGNOSIS — Z3A.16 16 WEEKS GESTATION OF PREGNANCY: ICD-10-CM

## 2017-04-27 ENCOUNTER — PATIENT MESSAGE (OUTPATIENT)
Dept: MEDICAL GROUP | Facility: MEDICAL CENTER | Age: 42
End: 2017-04-27

## 2017-04-27 DIAGNOSIS — Z3A.16 16 WEEKS GESTATION OF PREGNANCY: ICD-10-CM

## 2017-04-27 PROBLEM — Z78.0 POSTMENOPAUSAL: Status: RESOLVED | Noted: 2017-04-12 | Resolved: 2017-04-27

## 2017-04-28 ENCOUNTER — GYNECOLOGY VISIT (OUTPATIENT)
Dept: OBGYN | Facility: MEDICAL CENTER | Age: 42
End: 2017-04-28
Payer: COMMERCIAL

## 2017-04-28 VITALS
DIASTOLIC BLOOD PRESSURE: 56 MMHG | HEIGHT: 70 IN | WEIGHT: 175 LBS | SYSTOLIC BLOOD PRESSURE: 100 MMHG | BODY MASS INDEX: 25.05 KG/M2

## 2017-04-28 DIAGNOSIS — R10.31 RIGHT GROIN PAIN: ICD-10-CM

## 2017-04-28 DIAGNOSIS — Z85.830 HX OF EWING'S SARCOMA: ICD-10-CM

## 2017-04-28 DIAGNOSIS — N93.8 DUB (DYSFUNCTIONAL UTERINE BLEEDING): ICD-10-CM

## 2017-04-28 PROCEDURE — 99215 OFFICE O/P EST HI 40 MIN: CPT | Mod: 25 | Performed by: OBSTETRICS & GYNECOLOGY

## 2017-04-28 PROCEDURE — 76856 US EXAM PELVIC COMPLETE: CPT | Performed by: OBSTETRICS & GYNECOLOGY

## 2017-04-28 NOTE — PROGRESS NOTES
Cc: Confirmation of pregnancy and multiple questions    HPI:  The patient is a 41 y.o.  @ 17 3/7 based upon US performed on 17 that was being performed for groin pain. 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 She had testing showing she had ovarian failure so did not think she had a chance of becoming pregnant without fertility assistance so she has multiple questions and concerns from diet to testing to deliver to prenatal care to referrals.  Patient's last menstrual period was 2016.    She presents for a confirmation of pregnancy today and desires another US because she cannot believe she is pregnant and comes with her .  She denies  fetal movement,  denies  vaginal bleeding,  denies  leakage of fluid,  denies contractions.   She denies nausea/vomiting, denies headache, and denies dysuria.      Review of systems:  Pertinent positives documented in HPI and all other systems reviewed & are negative    OB History    Para Term  AB SAB TAB Ectopic Multiple Living   1         0      # Outcome Date GA Lbr Isacc/2nd Weight Sex Delivery Anes PTL Lv   1 Current                 Past Medical History   Diagnosis Date   • Headache, classical migraine    • Cancer (CMS-HCC) 2006     ramos's sarcoma.     Past Surgical History   Procedure Laterality Date   • Bunionectomy     • Amputation, below the knee       Social History     Social History   • Marital Status: Single     Spouse Name: N/A   • Number of Children: N/A   • Years of Education: N/A     Occupational History   • Not on file.     Social History Main Topics   • Smoking status: Never Smoker    • Smokeless tobacco: Never Used   • Alcohol Use: 2.0 oz/week     4 Glasses of wine per week   • Drug Use: No   • Sexual Activity:     Partners: Male     Other Topics Concern   • Not on file     Social History Narrative     Family History   Problem Relation Age of Onset   • Other Mother 48     Parkinson's   •  "Cancer Paternal Grandmother      breast cancer     Allergies:   Allergies as of 04/28/2017 - Bhupendra as Reviewed 04/12/2017   Allergen Reaction Noted   • Seasonal Runny Nose 05/14/2015       PE:    Blood pressure 100/56, height 1.778 m (5' 10\"), weight 79.379 kg (175 lb), last menstrual period 08/20/2016.      General:appears stated age, is in no apparent distress, is well developed and well nourished  Head: normocephalic, non-tender  Neck: neck is supple, no thyromegaly, there is full range of motion  Abdomen: Bowel sounds positive, nondistended, soft, nontender x4, no rebound or guarding. No organomegaly. No masses.  Female GYN: normal female external genitalia without lesions, no vaginal discharge, vulva pink without erythema or friability, declined internal exam.  Skin: No rashes, or ulcers or lesions seen  Psychiatric: Patient shows appropriate affect, is alert and oriented x3, intact judgment and insight.    Transabdominal US performed and per my read:    Indication: .    Findings: ledesma intrauterine pregnancy @ 17 5/7 weeks by BPD, HC, FL. Positive fetal movement. Positive fetal cardiac activity @ 148 BPM. Grade 1 placenta and low lying to previa. Cervical length WNL. Sex of fetus female. No free fluid in the cul-de-sac.    Impression: viable IUP @ 17 5/7 weeks. EDC by US of 10/1/17      A/P:   1. DUB (dysfunctional uterine bleeding)  PRENATAL PANEL 3+HIV+UACXI    REFERRAL TO PERINATOLOGY   2. Hx of Ludwig's sarcoma  REFERRAL TO PERINATOLOGY   3. Right groin pain         1. Spent 45 minutes in face-to-face patient contact in which greater than 50% of that visit was spent in counseling/coordination of care of newly diagnosed pregnancy including medical and surgical options of care as well as calming the new parents.  2. URGENT referral to [facility] placed per patient request since FOB is going to [place] in 3 weeks and she desires to go to [place] in 2 weeks and wants to have genetic testing done prior to " trips  3.  Prenatal labs given  4. Samples of prenatal vitamins given  5. Discussed in detail diet, vitamins, genetic testing, exercise, OTC meds, positions for sleeping

## 2017-04-28 NOTE — MR AVS SNAPSHOT
"Marcela Tiwari   2017 10:45 AM   Gynecology Visit   MRN: [id number]    Department:  [facility]   Dept Phone:  [phone]    Description:  Female : 1975   Provider:  Grace Andre M.D.           Reason for Visit     Other DUB/LMP unknown       Allergies as of 2017     Allergen Noted Reactions    Seasonal 2015   Runny Nose    Seasonal allergies      You were diagnosed with     DUB (dysfunctional uterine bleeding)   [679952]         Vital Signs     Blood Pressure Height Weight Body Mass Index Last Menstrual Period Smoking Status    100/56 mmHg 1.778 m (5' 10\") 79.379 kg (175 lb) 25.11 kg/m2 2016 Never Smoker       Basic Information     Date Of Birth Sex Race Ethnicity Preferred Language    1975 Female White Non- English      Your appointments     2017  1:15 PM   New OB with Grace Andre M.D.   [facility])    [address]  [employer] [place] [id number] [phone]            2017  1:45 PM   OB Follow Up with Grace Andre M.D.   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      Below and/or attached are the medications your provider expects you to take. " Review all of your home medications and newly ordered medications with your provider and/or pharmacist. Follow medication instructions as directed by your provider and/or pharmacist. Please keep your medication list with you and share with your provider. Update the information when medications are discontinued, doses are changed, or new medications (including over-the-counter products) are added; and carry medication information at all times in the event of emergency situations     Allergies:  SEASONAL - Runny Nose               Medications  Valid as of: April 28, 2017 - 11:51 AM    Generic Name Brand Name Tablet Size Instructions for use    Azelastine HCl (Solution) ASTELIN 137 MCG/SPRAY Spray 1 Spray in nose 2 times a day.        Eszopiclone (Tab) LUNESTA 1 MG Take 1 Tab by mouth at bedtime as needed for Insomnia.        Fluticasone Propionate (Suspension) FLONASE 50 MCG/ACT Spray 2 Sprays in nose every day.        Montelukast Sodium (Tab) SINGULAIR 10 MG Take 1 Tab by mouth every day.        SUMAtriptan Succinate (Tab) IMITREX 50 MG Take 1 Tab by mouth 1 time daily as needed for Migraine.        .                 Medicines prescribed today were sent to:     [facility] [address] [place] [address], [place] - [address]    [address] [place] [address]    Phone: [phone] Fax: [phone]    Open 24 Hours?: No      Medication refill instructions:       If your prescription bottle indicates you have medication refills left, it is not necessary to call your provider’s office. Please contact your pharmacy and they will refill your medication.    If your prescription bottle indicates you do not have any refills left, you may request refills at any time through one of the following ways: The online [employer] system (except Urgent Care), by calling your provider’s office, or by asking your pharmacy to contact your provider’s office with a refill request. Medication refills are processed only during regular business  hours and may not be available until the next business day. Your provider may request additional information or to have a follow-up visit with you prior to refilling your medication.   *Please Note: Medication refills are assigned a new Rx number when refilled electronically. Your pharmacy may indicate that no refills were authorized even though a new prescription for the same medication is available at the pharmacy. Please request the medicine by name with the pharmacy before contacting your provider for a refill.        Your To Do List     Future Labs[facility]Procedures Complete [facility]    PRENATAL PANEL 3+HIV+UAXI  As directed 4/29/2018         [employer] Access Code: Activation code not generated  Current [employer] Status: Active

## 2017-05-02 ENCOUNTER — HOSPITAL ENCOUNTER (OUTPATIENT)
Facility: MEDICAL CENTER | Age: 42
End: 2017-05-02
Attending: MEDICAL GENETICS
Payer: COMMERCIAL

## 2017-05-02 LAB — AMBIGUOUS DTTM AMBI4: NORMAL

## 2017-05-02 PROCEDURE — 81507 FETAL ANEUPLOIDY TRISOM RISK: CPT

## 2017-05-04 ENCOUNTER — PATIENT MESSAGE (OUTPATIENT)
Dept: MEDICAL GROUP | Facility: MEDICAL CENTER | Age: 42
End: 2017-05-04

## 2017-05-04 ENCOUNTER — OFFICE VISIT (OUTPATIENT)
Dept: MEDICAL GROUP | Facility: MEDICAL CENTER | Age: 42
End: 2017-05-04
Payer: COMMERCIAL

## 2017-05-04 VITALS
DIASTOLIC BLOOD PRESSURE: 70 MMHG | HEIGHT: 70 IN | TEMPERATURE: 98.1 F | OXYGEN SATURATION: 94 % | WEIGHT: 176 LBS | SYSTOLIC BLOOD PRESSURE: 102 MMHG | BODY MASS INDEX: 25.2 KG/M2 | HEART RATE: 90 BPM

## 2017-05-04 DIAGNOSIS — J02.9 PHARYNGITIS, UNSPECIFIED ETIOLOGY: ICD-10-CM

## 2017-05-04 PROCEDURE — 99213 OFFICE O/P EST LOW 20 MIN: CPT | Performed by: FAMILY MEDICINE

## 2017-05-04 RX ORDER — LORATADINE 10 MG/1
10 TABLET ORAL DAILY
COMMUNITY
End: 2018-03-30

## 2017-05-04 RX ORDER — ACETAMINOPHEN 500 MG
500-1000 TABLET ORAL EVERY 6 HOURS PRN
COMMUNITY
End: 2018-03-30

## 2017-05-04 ASSESSMENT — PATIENT HEALTH QUESTIONNAIRE - PHQ9: CLINICAL INTERPRETATION OF PHQ2 SCORE: 0

## 2017-05-04 NOTE — TELEPHONE ENCOUNTER
From: Marcela Tiwari  To: Ivan Armstrong M.D.  Sent: 5/4/2017 7:04 AM PDT  Subject: Procedure Question    Dr. Armstrong, Since last sunday I have not feeling well . I thought it is all about allergies since I stopped taking allergy medicine but recently I have been clear for Claritin and taking it again. Last night was the worst, I have headaches all the time. It is exhausting. I didnt sleep all night. And I have congestion in my nose , head and throat is itching. I am suspecting I have been fighting a cold since last Sunday. Should I try to see you today? Thank you. Marcela

## 2017-05-04 NOTE — MR AVS SNAPSHOT
"Marcela Tiwari   2017 10:00 AM   Office Visit   MRN: [id number]    Department:  [facility]   Dept Phone:  [phone]    Description:  Female : 1975   Provider:  Mikayla Caceres M.D.           Reason for Visit     Congestion     Pharyngitis     Ear Fullness           Allergies as of 2017     Allergen Noted Reactions    Seasonal 2015   Runny Nose    Seasonal allergies      Vital Signs     Blood Pressure Pulse Temperature Height Weight Body Mass Index    102/70 mmHg 90 36.7 °C (98.1 °F) 1.778 m (5' 10\") 79.833 kg (176 lb) 25.25 kg/m2    Oxygen Saturation Last Menstrual Period Breastfeeding? Smoking Status          94% 2016 No Never Smoker         Basic Information     Date Of Birth Sex Race Ethnicity Preferred Language    1975 Female White Non- English      Your appointments     2017  1:15 PM   New OB with Grace Andre M.D.   [facility])    [address]  [employer] [id number] [phone]            2017  1:45 PM   OB Follow Up with Grace Andre M.D.   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      Below and/or attached are the medications your " provider expects you to take. Review all of your home medications and newly ordered medications with your provider and/or pharmacist. Follow medication instructions as directed by your provider and/or pharmacist. Please keep your medication list with you and share with your provider. Update the information when medications are discontinued, doses are changed, or new medications (including over-the-counter products) are added; and carry medication information at all times in the event of emergency situations     Allergies:  SEASONAL - Runny Nose               Medications  Valid as of: May 04, 2017 - 11:17 AM    Generic Name Brand Name Tablet Size Instructions for use    Acetaminophen (Tab) TYLENOL 500 MG Take 500-1,000 mg by mouth every 6 hours as needed.        Azelastine HCl (Solution) ASTELIN 137 MCG/SPRAY Spray 1 Spray in nose 2 times a day.        Eszopiclone (Tab) LUNESTA 1 MG Take 1 Tab by mouth at bedtime as needed for Insomnia.        Fluticasone Propionate (Suspension) FLONASE 50 MCG/ACT Spray 2 Sprays in nose every day.        Loratadine (Tab) CLARITIN 10 MG Take 10 mg by mouth every day.        Montelukast Sodium (Tab) SINGULAIR 10 MG Take 1 Tab by mouth every day.        SUMAtriptan Succinate (Tab) IMITREX 50 MG Take 1 Tab by mouth 1 time daily as needed for Migraine.        .                 Medicines prescribed today were sent to:     [facility] [address] [place] [address], [place] - [address]    [address] [place] [address]    Phone: [phone] Fax: [phone]    Open 24 Hours?: No      Medication refill instructions:       If your prescription bottle indicates you have medication refills left, it is not necessary to call your provider’s office. Please contact your pharmacy and they will refill your medication.    If your prescription bottle indicates you do not have any refills left, you may request refills at any time through one of the following ways: The online [employer] system (except  Urgent Care), by calling your provider’s office, or by asking your pharmacy to contact your provider’s office with a refill request. Medication refills are processed only during regular business hours and may not be available until the next business day. Your provider may request additional information or to have a follow-up visit with you prior to refilling your medication.   *Please Note: Medication refills are assigned a new Rx number when refilled electronically. Your pharmacy may indicate that no refills were authorized even though a new prescription for the same medication is available at the pharmacy. Please request the medicine by name with the pharmacy before contacting your provider for a refill.           [employer] Access Code: Activation code not generated  Current [employer] Status: Active

## 2017-05-04 NOTE — TELEPHONE ENCOUNTER
From: Jennyfer Tiwari  To: Ivan Armstrong M.D.  Sent: 5/4/2017 8:16 AM PDT  Subject: Procedure Question    Yes. I am doing rinsing every single day a few times for a year or so.     I guess , I am just questioning if I need to see a general physician or just wait until it will go away on its on. I am feeling very miserable. Head is hurting so bad. I am just not sure if one of your colleagues will be able to help . I am pregnant and so far know that I can take only Tylenol.     Should I still call and try to see someone? Thank you. Jennyfer  ----- Message -----  From: Ivan Armstrong M.D.  Sent: 5/4/2017 7:28 AM PDT  To: Jennyfer Tiwari  Subject: RE: Procedure Question    Unfortunately my schedule is full today.  Have you tried nasal saline rinse, like a neti pot?  You can call [phone] to see if one of my colleagues have an opening.    Dr. Armstrong      ----- Message -----   From: JENNYFER TIWARI   Sent: 5/4/2017 7:04 AM PDT   To: Ivan Armstrong M.D.  Subject: Procedure Question    Dr. Armstrong, Since last sunday I have not feeling well . I thought it is all about allergies since I stopped taking allergy medicine but recently I have been clear for Claritin and taking it again. Last night was the worst, I have headaches all the time. It is exhausting. I didnt sleep all night. And I have congestion in my nose , head and throat is itching. I am suspecting I have been fighting a cold since last Sunday. Should I try to see you today? Thank you. Jennyfer

## 2017-05-07 PROBLEM — J02.9 PHARYNGITIS: Status: ACTIVE | Noted: 2017-05-07

## 2017-05-07 NOTE — PROGRESS NOTES
Subjective:     Chief Complaint   Patient presents with   • Congestion   • Pharyngitis   • Ear Fullness       Marcela Tiwari is a 41 y.o. female here today for evaluation and management of:    Pharyngitis  Patient has 4 days of sore throat and congestion.  Clear runny nose. Slight ear fullness. No abnormal shortness of breath. No nausea vomiting or diarrhea. Positive sick exposures. No coughing up blood. No headache.  Patient recently found out she was 18 weeks pregnant so wants to make sure it is just viral.         Allergies   Allergen Reactions   • Seasonal Runny Nose     Seasonal allergies       Current medicines (including changes today)  Current Outpatient Prescriptions   Medication Sig Dispense Refill   • loratadine (CLARITIN) 10 MG Tab Take 10 mg by mouth every day.     • acetaminophen (TYLENOL) 500 MG Tab Take 500-1,000 mg by mouth every 6 hours as needed.     • fluticasone (FLONASE) 50 MCG/ACT nasal spray Spray 2 Sprays in nose every day. 16 g 5   • azelastine (ASTELIN) 137 MCG/SPRAY nasal spray [place] 1 Spray in nose 2 times a day. 30 mL 2   • sumatriptan (IMITREX) 50 MG Tab Take 1 Tab by mouth 1 time daily as needed for Migraine. 10 Tab 5   • eszopiclone (LUNESTA) 1 MG Tab tablet Take 1 Tab by mouth at bedtime as needed for Insomnia. 30 Tab 1   • montelukast (SINGULAIR) 10 MG Tab Take 1 Tab by mouth every day. 30 Tab 1     No current facility-administered medications for this visit.       She  has a past medical history of Headache, classical migraine and Cancer (CMS-HCC) (2006).    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  No nausea or vomiting.  No chest pain or palpitations.  No cough or SOB.  No  "pain with urination or hematuria.  No black or bloody stools.       Objective:     Blood pressure 102/70, pulse 90, temperature 36.7 °C (98.1 °F), height 1.778 m (5' 10\"), weight 79.833 kg (176 lb), last menstrual period 08/04/2016, SpO2 94 %, not currently breastfeeding. Body mass index is 25.25 kg/(m^2).   Physical Exam:  Well developed, well nourished.  Alert, oriented in no acute distress.  Eye contact is good, speech goal directed, affect calm  Eyes: conjunctiva non-injected, sclera non-icteric.  Ears: Pinna normal. TM with clear fluid bilaterally  Nose: Nares are patent.  Erythematous mucosa with clear discharge  Mouth: Oral mucous membranes pink and moist with no lesions. Moderate posterior pharyngeal erythema   Neck Supple.  No adenopathy or masses in the neck or supraclavicular regions. No thyromegaly  Lungs: clear to auscultation bilaterally with good excursion. No wheezes or rhonchi  CV: regular rate and rhythm. No murmur  POCT Strep - negative      Assessment and Plan:   The following treatment plan was discussed    1. Pharyngitis, unspecified etiology  Increase fluids and rest.  Tylenol and Benadryl are okay.  Call if symptoms worsen    Any change or worsening of signs or symptoms, patient encouraged to follow-up or report to the emergency room for further evaluation. Patient understands and agrees.    Followup: Return if symptoms worsen or fail to improve.             "

## 2017-05-07 NOTE — ASSESSMENT & PLAN NOTE
Patient has 4 days of sore throat and congestion.  Clear runny nose. Slight ear fullness. No abnormal shortness of breath. No nausea vomiting or diarrhea. Positive sick exposures. No coughing up blood. No headache.  Patient recently found out she was 18 weeks pregnant so wants to make sure it is just viral.

## 2017-05-09 ENCOUNTER — HOSPITAL ENCOUNTER (OUTPATIENT)
Dept: LAB | Facility: MEDICAL CENTER | Age: 42
End: 2017-05-09
Attending: NURSE PRACTITIONER
Payer: COMMERCIAL

## 2017-05-09 LAB
ABO GROUP BLD: NORMAL
BASOPHILS # BLD AUTO: 0.1 % (ref 0–1.8)
BASOPHILS # BLD: 0.01 K/UL (ref 0–0.12)
BLD GP AB SCN SERPL QL: NORMAL
EOSINOPHIL # BLD AUTO: 0.07 K/UL (ref 0–0.51)
EOSINOPHIL NFR BLD: 0.8 % (ref 0–6.9)
ERYTHROCYTE [DISTWIDTH] IN BLOOD BY AUTOMATED COUNT: 46 FL (ref 35.9–50)
HBV SURFACE AG SER QL: NEGATIVE
HCT VFR BLD AUTO: 36.7 % (ref 37–47)
HGB BLD-MCNC: 11.9 G/DL (ref 12–16)
HIV 1+2 AB+HIV1 P24 AG SERPL QL IA: NON REACTIVE
IMM GRANULOCYTES # BLD AUTO: 0.06 K/UL (ref 0–0.11)
IMM GRANULOCYTES NFR BLD AUTO: 0.7 % (ref 0–0.9)
LYMPHOCYTES # BLD AUTO: 1.49 K/UL (ref 1–4.8)
LYMPHOCYTES NFR BLD: 16.7 % (ref 22–41)
MCH RBC QN AUTO: 31.5 PG (ref 27–33)
MCHC RBC AUTO-ENTMCNC: 32.4 G/DL (ref 33.6–35)
MCV RBC AUTO: 97.1 FL (ref 81.4–97.8)
MONOCYTES # BLD AUTO: 0.31 K/UL (ref 0–0.85)
MONOCYTES NFR BLD AUTO: 3.5 % (ref 0–13.4)
NEUTROPHILS # BLD AUTO: 6.97 K/UL (ref 2–7.15)
NEUTROPHILS NFR BLD: 78.2 % (ref 44–72)
NRBC # BLD AUTO: 0 K/UL
NRBC BLD AUTO-RTO: 0 /100 WBC
PLATELET # BLD AUTO: 293 K/UL (ref 164–446)
PMV BLD AUTO: 9.8 FL (ref 9–12.9)
RBC # BLD AUTO: 3.78 M/UL (ref 4.2–5.4)
RH BLD: NORMAL
RUBV AB SER QL: 370.5 IU/ML
TREPONEMA PALLIDUM IGG+IGM AB [PRESENCE] IN SERUM OR PLASMA BY IMMUNOASSAY: NON REACTIVE
WBC # BLD AUTO: 8.9 K/UL (ref 4.8–10.8)

## 2017-05-09 PROCEDURE — 87340 HEPATITIS B SURFACE AG IA: CPT

## 2017-05-09 PROCEDURE — 86780 TREPONEMA PALLIDUM: CPT

## 2017-05-09 PROCEDURE — 86762 RUBELLA ANTIBODY: CPT

## 2017-05-09 PROCEDURE — 86850 RBC ANTIBODY SCREEN: CPT

## 2017-05-09 PROCEDURE — 87389 HIV-1 AG W/HIV-1&-2 AB AG IA: CPT

## 2017-05-09 PROCEDURE — 87086 URINE CULTURE/COLONY COUNT: CPT

## 2017-05-09 PROCEDURE — 86900 BLOOD TYPING SEROLOGIC ABO: CPT

## 2017-05-09 PROCEDURE — 86901 BLOOD TYPING SEROLOGIC RH(D): CPT

## 2017-05-09 PROCEDURE — 85025 COMPLETE CBC W/AUTO DIFF WBC: CPT

## 2017-05-09 PROCEDURE — 81220 CFTR GENE COM VARIANTS: CPT

## 2017-05-10 LAB
ANNOTATION COMMENT IMP: NORMAL
FET CHR X + Y ANEUP RISK PLAS.CFDNA QN: NORMAL
FET SEX US: NORMAL
FET TS 13 RISK PLAS.CFDNA QL: NORMAL
FET TS 18 RISK PLAS.CFDNA QL: NORMAL
FET TS 21 RISK PLAS.CFDNA QL: NORMAL
FETAL FRACTION Q0204: 13.8
GA (DAYS): 1 D
GA (WEEKS): 18 WK
PATHOLOGY STUDY: NORMAL
SERVICE CMNT-IMP: YES
TRIPLOIDY VAN TWIN Q0202: NORMAL

## 2017-05-11 LAB
BACTERIA UR CULT: ABNORMAL
BACTERIA UR CULT: ABNORMAL
SIGNIFICANT IND 70042: ABNORMAL
SOURCE SOURCE: ABNORMAL

## 2017-05-14 LAB
CF EXPANDED VARIANT PANEL INTERP Q4864: NORMAL
CFTR ALLELE 1 BLD/T QL: NEGATIVE
CFTR ALLELE 1 BLD/T QL: NEGATIVE
CFTR MUT ANL BLD/T: NORMAL

## 2017-05-15 ENCOUNTER — TELEPHONE (OUTPATIENT)
Dept: HEMATOLOGY ONCOLOGY | Facility: MEDICAL CENTER | Age: 42
End: 2017-05-15

## 2017-05-15 NOTE — TELEPHONE ENCOUNTER
Left voicemail for patient asking her to return my call at medical oncology. I wanted to inform her that Dr. Figueredo will be leaving the practice and see if she would like to establish with Dr. Sidhu here in our office, or if she would like a referral to a different oncologist. New office phone number was provided.

## 2017-06-17 ENCOUNTER — APPOINTMENT (OUTPATIENT)
Dept: OTHER | Facility: IMAGING CENTER | Age: 42
End: 2017-06-17

## 2017-06-30 ENCOUNTER — HOSPITAL ENCOUNTER (OUTPATIENT)
Dept: LAB | Facility: MEDICAL CENTER | Age: 42
End: 2017-06-30
Attending: OBSTETRICS & GYNECOLOGY
Payer: COMMERCIAL

## 2017-06-30 LAB
BASOPHILS # BLD AUTO: 0.3 % (ref 0–1.8)
BASOPHILS # BLD: 0.04 K/UL (ref 0–0.12)
EOSINOPHIL # BLD AUTO: 0.14 K/UL (ref 0–0.51)
EOSINOPHIL NFR BLD: 1.2 % (ref 0–6.9)
ERYTHROCYTE [DISTWIDTH] IN BLOOD BY AUTOMATED COUNT: 48.1 FL (ref 35.9–50)
GLUCOSE 1H P 50 G GLC PO SERPL-MCNC: 148 MG/DL (ref 70–139)
HCT VFR BLD AUTO: 34.5 % (ref 37–47)
HGB BLD-MCNC: 11.3 G/DL (ref 12–16)
IMM GRANULOCYTES # BLD AUTO: 0.09 K/UL (ref 0–0.11)
IMM GRANULOCYTES NFR BLD AUTO: 0.8 % (ref 0–0.9)
LYMPHOCYTES # BLD AUTO: 1.27 K/UL (ref 1–4.8)
LYMPHOCYTES NFR BLD: 11 % (ref 22–41)
MCH RBC QN AUTO: 31.6 PG (ref 27–33)
MCHC RBC AUTO-ENTMCNC: 32.8 G/DL (ref 33.6–35)
MCV RBC AUTO: 96.4 FL (ref 81.4–97.8)
MONOCYTES # BLD AUTO: 0.46 K/UL (ref 0–0.85)
MONOCYTES NFR BLD AUTO: 4 % (ref 0–13.4)
NEUTROPHILS # BLD AUTO: 9.51 K/UL (ref 2–7.15)
NEUTROPHILS NFR BLD: 82.7 % (ref 44–72)
NRBC # BLD AUTO: 0 K/UL
NRBC BLD AUTO-RTO: 0 /100 WBC
PLATELET # BLD AUTO: 225 K/UL (ref 164–446)
PMV BLD AUTO: 10 FL (ref 9–12.9)
RBC # BLD AUTO: 3.58 M/UL (ref 4.2–5.4)
WBC # BLD AUTO: 11.5 K/UL (ref 4.8–10.8)

## 2017-06-30 PROCEDURE — 82950 GLUCOSE TEST: CPT

## 2017-06-30 PROCEDURE — 85025 COMPLETE CBC W/AUTO DIFF WBC: CPT

## 2017-06-30 PROCEDURE — 36415 COLL VENOUS BLD VENIPUNCTURE: CPT

## 2017-07-14 ENCOUNTER — HOSPITAL ENCOUNTER (OUTPATIENT)
Dept: LAB | Facility: MEDICAL CENTER | Age: 42
End: 2017-07-14
Attending: OBSTETRICS & GYNECOLOGY
Payer: COMMERCIAL

## 2017-07-14 LAB
GLUCOSE 1H P CHAL SERPL-MCNC: 122 MG/DL (ref 65–180)
GLUCOSE 2H P CHAL SERPL-MCNC: 101 MG/DL (ref 65–155)
GLUCOSE 3H P CHAL SERPL-MCNC: 42 MG/DL (ref 65–140)
GLUCOSE BS SERPL-MCNC: 78 MG/DL (ref 65–95)

## 2017-07-14 PROCEDURE — 36415 COLL VENOUS BLD VENIPUNCTURE: CPT

## 2017-07-14 PROCEDURE — 82951 GLUCOSE TOLERANCE TEST (GTT): CPT

## 2017-07-14 PROCEDURE — 82952 GTT-ADDED SAMPLES: CPT

## 2017-08-23 ENCOUNTER — APPOINTMENT (OUTPATIENT)
Dept: OTHER | Facility: IMAGING CENTER | Age: 42
End: 2017-08-23

## 2017-08-29 ENCOUNTER — APPOINTMENT (OUTPATIENT)
Dept: OTHER | Facility: IMAGING CENTER | Age: 42
End: 2017-08-29

## 2017-09-05 ENCOUNTER — HOSPITAL ENCOUNTER (OUTPATIENT)
Facility: MEDICAL CENTER | Age: 42
End: 2017-09-05
Attending: OBSTETRICS & GYNECOLOGY
Payer: COMMERCIAL

## 2017-09-05 PROCEDURE — 87653 STREP B DNA AMP PROBE: CPT

## 2017-09-07 LAB — GP B STREP DNA SPEC QL NAA+PROBE: NEGATIVE

## 2017-10-08 ENCOUNTER — HOSPITAL ENCOUNTER (INPATIENT)
Facility: MEDICAL CENTER | Age: 42
LOS: 3 days | End: 2017-10-11
Attending: OBSTETRICS & GYNECOLOGY | Admitting: OBSTETRICS & GYNECOLOGY
Payer: COMMERCIAL

## 2017-10-08 LAB
BASOPHILS # BLD AUTO: 0.3 % (ref 0–1.8)
BASOPHILS # BLD: 0.04 K/UL (ref 0–0.12)
EOSINOPHIL # BLD AUTO: 0.07 K/UL (ref 0–0.51)
EOSINOPHIL NFR BLD: 0.5 % (ref 0–6.9)
ERYTHROCYTE [DISTWIDTH] IN BLOOD BY AUTOMATED COUNT: 47.8 FL (ref 35.9–50)
HCT VFR BLD AUTO: 42.7 % (ref 37–47)
HGB BLD-MCNC: 14.6 G/DL (ref 12–16)
HOLDING TUBE BB 8507: NORMAL
IMM GRANULOCYTES # BLD AUTO: 0.17 K/UL (ref 0–0.11)
IMM GRANULOCYTES NFR BLD AUTO: 1.1 % (ref 0–0.9)
LYMPHOCYTES # BLD AUTO: 1.62 K/UL (ref 1–4.8)
LYMPHOCYTES NFR BLD: 10.9 % (ref 22–41)
MCH RBC QN AUTO: 32.2 PG (ref 27–33)
MCHC RBC AUTO-ENTMCNC: 34.2 G/DL (ref 33.6–35)
MCV RBC AUTO: 94.1 FL (ref 81.4–97.8)
MONOCYTES # BLD AUTO: 0.84 K/UL (ref 0–0.85)
MONOCYTES NFR BLD AUTO: 5.7 % (ref 0–13.4)
NEUTROPHILS # BLD AUTO: 12.12 K/UL (ref 2–7.15)
NEUTROPHILS NFR BLD: 81.5 % (ref 44–72)
NRBC # BLD AUTO: 0 K/UL
NRBC BLD AUTO-RTO: 0 /100 WBC
PLATELET # BLD AUTO: 224 K/UL (ref 164–446)
PMV BLD AUTO: 10.5 FL (ref 9–12.9)
RBC # BLD AUTO: 4.54 M/UL (ref 4.2–5.4)
WBC # BLD AUTO: 14.9 K/UL (ref 4.8–10.8)

## 2017-10-08 PROCEDURE — 700111 HCHG RX REV CODE 636 W/ 250 OVERRIDE (IP): Performed by: OBSTETRICS & GYNECOLOGY

## 2017-10-08 PROCEDURE — 700105 HCHG RX REV CODE 258: Performed by: OBSTETRICS & GYNECOLOGY

## 2017-10-08 PROCEDURE — 85025 COMPLETE CBC W/AUTO DIFF WBC: CPT

## 2017-10-08 PROCEDURE — 770002 HCHG ROOM/CARE - OB PRIVATE (112)

## 2017-10-08 PROCEDURE — 4A1HXCZ MONITORING OF PRODUCTS OF CONCEPTION, CARDIAC RATE, EXTERNAL APPROACH: ICD-10-PCS | Performed by: OBSTETRICS & GYNECOLOGY

## 2017-10-08 RX ORDER — SODIUM CHLORIDE, SODIUM LACTATE, POTASSIUM CHLORIDE, CALCIUM CHLORIDE 600; 310; 30; 20 MG/100ML; MG/100ML; MG/100ML; MG/100ML
INJECTION, SOLUTION INTRAVENOUS
Status: ACTIVE
Start: 2017-10-08 | End: 2017-10-09

## 2017-10-08 RX ORDER — MISOPROSTOL 200 UG/1
800 TABLET ORAL
Status: DISCONTINUED | OUTPATIENT
Start: 2017-10-08 | End: 2017-10-09 | Stop reason: HOSPADM

## 2017-10-08 RX ORDER — ROPIVACAINE HYDROCHLORIDE 2 MG/ML
INJECTION, SOLUTION EPIDURAL; INFILTRATION; PERINEURAL
Status: COMPLETED
Start: 2017-10-08 | End: 2017-10-09

## 2017-10-08 RX ORDER — DEXTROSE, SODIUM CHLORIDE, SODIUM LACTATE, POTASSIUM CHLORIDE, AND CALCIUM CHLORIDE 5; .6; .31; .03; .02 G/100ML; G/100ML; G/100ML; G/100ML; G/100ML
INJECTION, SOLUTION INTRAVENOUS CONTINUOUS
Status: DISCONTINUED | OUTPATIENT
Start: 2017-10-09 | End: 2017-10-09 | Stop reason: HOSPADM

## 2017-10-08 RX ORDER — SODIUM CHLORIDE, SODIUM LACTATE, POTASSIUM CHLORIDE, CALCIUM CHLORIDE 600; 310; 30; 20 MG/100ML; MG/100ML; MG/100ML; MG/100ML
INJECTION, SOLUTION INTRAVENOUS CONTINUOUS
Status: DISPENSED | OUTPATIENT
Start: 2017-10-08 | End: 2017-10-09

## 2017-10-08 RX ADMIN — FENTANYL CITRATE 100 MCG: 50 INJECTION, SOLUTION INTRAMUSCULAR; INTRAVENOUS at 22:12

## 2017-10-08 RX ADMIN — SODIUM CHLORIDE, POTASSIUM CHLORIDE, SODIUM LACTATE AND CALCIUM CHLORIDE: 600; 310; 30; 20 INJECTION, SOLUTION INTRAVENOUS at 22:00

## 2017-10-08 RX ADMIN — SODIUM CHLORIDE, POTASSIUM CHLORIDE, SODIUM LACTATE AND CALCIUM CHLORIDE: 600; 310; 30; 20 INJECTION, SOLUTION INTRAVENOUS at 23:32

## 2017-10-08 RX ADMIN — FENTANYL CITRATE 100 MCG: 50 INJECTION, SOLUTION INTRAMUSCULAR; INTRAVENOUS at 23:25

## 2017-10-08 RX ADMIN — SODIUM CHLORIDE, POTASSIUM CHLORIDE, SODIUM LACTATE AND CALCIUM CHLORIDE: 600; 310; 30; 20 INJECTION, SOLUTION INTRAVENOUS at 22:18

## 2017-10-09 PROCEDURE — 700102 HCHG RX REV CODE 250 W/ 637 OVERRIDE(OP): Performed by: OBSTETRICS & GYNECOLOGY

## 2017-10-09 PROCEDURE — A9270 NON-COVERED ITEM OR SERVICE: HCPCS | Performed by: OBSTETRICS & GYNECOLOGY

## 2017-10-09 PROCEDURE — 10H07YZ INSERTION OF OTHER DEVICE INTO PRODUCTS OF CONCEPTION, VIA NATURAL OR ARTIFICIAL OPENING: ICD-10-PCS | Performed by: OBSTETRICS & GYNECOLOGY

## 2017-10-09 PROCEDURE — 304965 HCHG RECOVERY SERVICES

## 2017-10-09 PROCEDURE — 10907ZC DRAINAGE OF AMNIOTIC FLUID, THERAPEUTIC FROM PRODUCTS OF CONCEPTION, VIA NATURAL OR ARTIFICIAL OPENING: ICD-10-PCS | Performed by: OBSTETRICS & GYNECOLOGY

## 2017-10-09 PROCEDURE — 36415 COLL VENOUS BLD VENIPUNCTURE: CPT

## 2017-10-09 PROCEDURE — 303615 HCHG EPIDURAL/SPINAL ANESTHESIA FOR LABOR

## 2017-10-09 PROCEDURE — 700111 HCHG RX REV CODE 636 W/ 250 OVERRIDE (IP)

## 2017-10-09 PROCEDURE — 700105 HCHG RX REV CODE 258: Performed by: OBSTETRICS & GYNECOLOGY

## 2017-10-09 PROCEDURE — 0HQ9XZZ REPAIR PERINEUM SKIN, EXTERNAL APPROACH: ICD-10-PCS | Performed by: OBSTETRICS & GYNECOLOGY

## 2017-10-09 PROCEDURE — 770002 HCHG ROOM/CARE - OB PRIVATE (112)

## 2017-10-09 PROCEDURE — 700111 HCHG RX REV CODE 636 W/ 250 OVERRIDE (IP): Performed by: OBSTETRICS & GYNECOLOGY

## 2017-10-09 PROCEDURE — 59409 OBSTETRICAL CARE: CPT

## 2017-10-09 RX ORDER — ONDANSETRON 4 MG/1
4 TABLET, ORALLY DISINTEGRATING ORAL EVERY 6 HOURS PRN
Status: DISCONTINUED | OUTPATIENT
Start: 2017-10-09 | End: 2017-10-11 | Stop reason: HOSPADM

## 2017-10-09 RX ORDER — ROPIVACAINE HYDROCHLORIDE 2 MG/ML
INJECTION, SOLUTION EPIDURAL; INFILTRATION; PERINEURAL
Status: COMPLETED
Start: 2017-10-09 | End: 2017-10-09

## 2017-10-09 RX ORDER — VITAMIN A ACETATE, BETA CAROTENE, ASCORBIC ACID, CHOLECALCIFEROL, .ALPHA.-TOCOPHEROL ACETATE, DL-, THIAMINE MONONITRATE, RIBOFLAVIN, NIACINAMIDE, PYRIDOXINE HYDROCHLORIDE, FOLIC ACID, CYANOCOBALAMIN, CALCIUM CARBONATE, FERROUS FUMARATE, ZINC OXIDE, CUPRIC OXIDE 3080; 12; 120; 400; 1; 1.84; 3; 20; 22; 920; 25; 200; 27; 10; 2 [IU]/1; UG/1; MG/1; [IU]/1; MG/1; MG/1; MG/1; MG/1; MG/1; [IU]/1; MG/1; MG/1; MG/1; MG/1; MG/1
1 TABLET, FILM COATED ORAL EVERY MORNING
Status: DISCONTINUED | OUTPATIENT
Start: 2017-10-10 | End: 2017-10-11 | Stop reason: HOSPADM

## 2017-10-09 RX ORDER — DOCUSATE SODIUM 100 MG/1
100 CAPSULE, LIQUID FILLED ORAL 2 TIMES DAILY PRN
Status: DISCONTINUED | OUTPATIENT
Start: 2017-10-09 | End: 2017-10-11 | Stop reason: HOSPADM

## 2017-10-09 RX ORDER — SODIUM CHLORIDE, SODIUM LACTATE, POTASSIUM CHLORIDE, CALCIUM CHLORIDE 600; 310; 30; 20 MG/100ML; MG/100ML; MG/100ML; MG/100ML
INJECTION, SOLUTION INTRAVENOUS PRN
Status: DISCONTINUED | OUTPATIENT
Start: 2017-10-09 | End: 2017-10-11 | Stop reason: HOSPADM

## 2017-10-09 RX ORDER — CALCIUM CARBONATE 500 MG/1
1000 TABLET, CHEWABLE ORAL PRN
Status: DISCONTINUED | OUTPATIENT
Start: 2017-10-09 | End: 2017-10-11 | Stop reason: HOSPADM

## 2017-10-09 RX ORDER — IBUPROFEN 600 MG/1
600 TABLET ORAL EVERY 6 HOURS PRN
Status: DISCONTINUED | OUTPATIENT
Start: 2017-10-09 | End: 2017-10-11 | Stop reason: HOSPADM

## 2017-10-09 RX ORDER — MISOPROSTOL 200 UG/1
600 TABLET ORAL
Status: DISCONTINUED | OUTPATIENT
Start: 2017-10-09 | End: 2017-10-11 | Stop reason: HOSPADM

## 2017-10-09 RX ORDER — SUMATRIPTAN 50 MG/1
50 TABLET, FILM COATED ORAL
Status: DISCONTINUED | OUTPATIENT
Start: 2017-10-09 | End: 2017-10-11 | Stop reason: HOSPADM

## 2017-10-09 RX ORDER — ACETAMINOPHEN 325 MG/1
650 TABLET ORAL EVERY 4 HOURS PRN
Status: DISCONTINUED | OUTPATIENT
Start: 2017-10-09 | End: 2017-10-11 | Stop reason: HOSPADM

## 2017-10-09 RX ORDER — ONDANSETRON 2 MG/ML
4 INJECTION INTRAMUSCULAR; INTRAVENOUS EVERY 6 HOURS PRN
Status: DISCONTINUED | OUTPATIENT
Start: 2017-10-09 | End: 2017-10-11 | Stop reason: HOSPADM

## 2017-10-09 RX ORDER — OXYCODONE AND ACETAMINOPHEN 10; 325 MG/1; MG/1
1 TABLET ORAL EVERY 4 HOURS PRN
Status: DISCONTINUED | OUTPATIENT
Start: 2017-10-09 | End: 2017-10-11 | Stop reason: HOSPADM

## 2017-10-09 RX ORDER — OXYCODONE HYDROCHLORIDE AND ACETAMINOPHEN 5; 325 MG/1; MG/1
1 TABLET ORAL EVERY 4 HOURS PRN
Status: DISCONTINUED | OUTPATIENT
Start: 2017-10-09 | End: 2017-10-11 | Stop reason: HOSPADM

## 2017-10-09 RX ORDER — BUPIVACAINE HYDROCHLORIDE 2.5 MG/ML
INJECTION, SOLUTION EPIDURAL; INFILTRATION; INTRACAUDAL
Status: ACTIVE
Start: 2017-10-09 | End: 2017-10-10

## 2017-10-09 RX ADMIN — ROPIVACAINE HYDROCHLORIDE 100 ML: 2 INJECTION, SOLUTION EPIDURAL; INFILTRATION at 00:05

## 2017-10-09 RX ADMIN — ACETAMINOPHEN 650 MG: 325 TABLET, FILM COATED ORAL at 06:28

## 2017-10-09 RX ADMIN — SODIUM CHLORIDE, SODIUM LACTATE, POTASSIUM CHLORIDE, CALCIUM CHLORIDE AND DEXTROSE MONOHYDRATE: 5; 600; 310; 30; 20 INJECTION, SOLUTION INTRAVENOUS at 14:10

## 2017-10-09 RX ADMIN — ROPIVACAINE HYDROCHLORIDE 100 ML: 2 INJECTION, SOLUTION EPIDURAL; INFILTRATION at 17:34

## 2017-10-09 RX ADMIN — IBUPROFEN 600 MG: 600 TABLET, FILM COATED ORAL at 21:13

## 2017-10-09 RX ADMIN — SODIUM CHLORIDE, SODIUM LACTATE, POTASSIUM CHLORIDE, CALCIUM CHLORIDE AND DEXTROSE MONOHYDRATE: 5; 600; 310; 30; 20 INJECTION, SOLUTION INTRAVENOUS at 06:30

## 2017-10-09 RX ADMIN — OXYTOCIN 1 MILLI-UNITS/MIN: 10 INJECTION, SOLUTION INTRAMUSCULAR; INTRAVENOUS at 09:00

## 2017-10-09 RX ADMIN — ANTACID TABLETS 500 MG: 500 TABLET, CHEWABLE ORAL at 02:19

## 2017-10-09 RX ADMIN — OXYTOCIN 125 ML/HR: 10 INJECTION, SOLUTION INTRAMUSCULAR; INTRAVENOUS at 18:55

## 2017-10-09 RX ADMIN — FENTANYL CITRATE 100 MCG: 50 INJECTION, SOLUTION INTRAMUSCULAR; INTRAVENOUS at 17:34

## 2017-10-09 RX ADMIN — ROPIVACAINE HYDROCHLORIDE 100 ML: 2 INJECTION, SOLUTION EPIDURAL; INFILTRATION at 08:31

## 2017-10-09 ASSESSMENT — PAIN SCALES - GENERAL
PAINLEVEL_OUTOF10: 3
PAINLEVEL_OUTOF10: 4
PAINLEVEL_OUTOF10: 2

## 2017-10-09 ASSESSMENT — COPD QUESTIONNAIRES
HAVE YOU SMOKED AT LEAST 100 CIGARETTES IN YOUR ENTIRE LIFE: NO/DON'T KNOW
DURING THE PAST 4 WEEKS HOW MUCH DID YOU FEEL SHORT OF BREATH: NONE/LITTLE OF THE TIME
DO YOU EVER COUGH UP ANY MUCUS OR PHLEGM?: NO/ONLY WITH OCCASIONAL COLDS OR INFECTIONS
COPD SCREENING SCORE: 0

## 2017-10-09 ASSESSMENT — LIFESTYLE VARIABLES
EVER_SMOKED: YES
ALCOHOL_USE: NO

## 2017-10-09 NOTE — CARE PLAN
Problem: Infection  Goal: Will remain free from infection  Outcome: PROGRESSING AS EXPECTED  Pt is free from infection  Intervention: Assess signs and symptoms of infection  Pt remains free from signs and symptoms of infection  Intervention: Implement standard precautions and perform hand washing before and after patient contact  Hand hygiene performed before and after pt contact

## 2017-10-09 NOTE — PROGRESS NOTES
Pt has been complete x 90 min, epidural was decreased from 10 to 8  Pt is not cooperating with pushing effort despite encouragement and coaching from , nurse and doctor  She states she hurts too much  IUPC pressure indicates NO maternal effort  Will redose epidural and allow to rest and descend  FHT cat I, fluid clear  Explained to pt need for urgency not there

## 2017-10-09 NOTE — CARE PLAN
Problem: Pain  Goal: Alleviation of Pain or a reduction in pain to the patient's comfort goal  Outcome: PROGRESSING AS EXPECTED  Pt is free from pain with an epidural in place  Intervention: Pain Management - Epidural/Spinal  Pt has an epidural in place and is free from pain  Intervention: Pain Management--Medications  Pt educated on medications available for labor delivery and postpartum

## 2017-10-09 NOTE — PROGRESS NOTES
Comfortable with epidural    Ctx infrequent, FHT cat I  Cx 6cm/-1/100%  AROM  IUPC placed    Will augment if needed

## 2017-10-09 NOTE — PROGRESS NOTES
0700: Assumed care of pt. AAO, epidural in place, pt denies pain. POC discussed, pt verbalized understanding. All needs met at this time.

## 2017-10-09 NOTE — PROGRESS NOTES
9392- Received report from RADHA Lovelace RN. Pt at EOB for comfort. Pt requesting epidural for pain management. Quinn (boyfriend) and Aury () at bedside. POC discussed.    0140- SVE 4-5/80%/-2    0610- SVE 4-5/80%/-2    0700- Report given to ROXANN Phillip RN

## 2017-10-09 NOTE — PROGRESS NOTES
0730: Dr. Musa to bedside AROM of clear fluid at 0738, IUPC placed, 3 min deceleration after amniotomy with return to baseline. Dr. Musa still at bedside, no new orders received.

## 2017-10-09 NOTE — CARE PLAN
Problem: Pain  Goal: Alleviation of Pain or a reduction in pain to the patient's comfort goal    Intervention: Pain Management - Epidural/Spinal  Pain adequately managed with IV fentanyl and epidural.      Problem: Risk for Infection, Impaired Wound Healing  Goal: Remain free from signs and symptoms of infection    Intervention: Use aseptic technique during vaginal examination  Aseptic technique used for vaginal exams.      Problem: Risk for injury  Goal: Patient and fetus will be free of preventable injury/complications  Outcome: PROGRESSING AS EXPECTED  Pt placed on EFM and TOCO to monitor fetal well being and uterine activity.

## 2017-10-09 NOTE — H&P
"Labor and Delivery History and Physical    CC:contractions    HPI:Marcela Tiwari is a 40 yo  who presented to OBT at 40wk6d with complaints of contractions. Denies bleeding or LOF. Positive FM. No pre-E symptoms. She did demonstrate cervical change in triage from 1-2 cm to 3cm. She received her prenatal care with Dr Musa this pregnancy and it was largely uncomplicated.     All other systems were reviewed and were negative.    PMH:Depression, migraine  PSH:left leg amputation  OB HX: current  Gyn Hx:Denies STI or abnormal pap smear  SH:Denies T/E/D  Meds:PNV  Allergies:NKDA    Temp 36.6 °C (97.9 °F)   Ht 1.803 m (5' 11\")   Wt 88.5 kg (195 lb)   BMI 27.20 kg/m²     Physical Exam  Gen: NAD  Abd: gravid, non tender  Ext: no edema    FHT:130/mod scotty/+accels  [place]:5 min  SVE:3/80/-2    Laboratory Evaluation  Recent Labs      10/08/17   2206   WBC  14.9*   RBC  4.54   HEMOGLOBIN  14.6   HEMATOCRIT  42.7   MCV  94.1   MCH  32.2   RDW  47.8   PLATELETCT  224   MPV  10.5   NEUTSPOLYS  81.50*   LYMPHOCYTES  10.90*   MONOCYTES  5.70   EOSINOPHILS  0.50   BASOPHILS  0.30       ABO: A pos  GBS: neg  GTT:78,122,101,42  Rubella: Immune  HIV: Neg  RPR: NR  Hep sAg: neg  Pap: NILM    Assessment:   1. Term intrauterine pregnancy  2. Labor    Plan: Admit to L&D for management of labor. Epidural on demand. Anticipate vaginal delivery    Tadeo Schneider M.D.      "

## 2017-10-09 NOTE — PROGRESS NOTES
G1 PO, HERRERA 10/2, EGA 40.6    Denies LOF, VB, positive FM.     Present with CO UC's that are 5 mins apart X1 hour.    SVE 1-2/80/-2 2020 Call to [facility], orders to ambulate x1 hour and repeat SVE.    2133 PT back in be, SVE per doc flow. PT breathing through contractions and appears very uncomfortable.     2135 Call to [facility], orders to admit for labor.    2152 To room 221.

## 2017-10-10 LAB
ERYTHROCYTE [DISTWIDTH] IN BLOOD BY AUTOMATED COUNT: 49.9 FL (ref 35.9–50)
HCT VFR BLD AUTO: 35.3 % (ref 37–47)
HGB BLD-MCNC: 11.9 G/DL (ref 12–16)
MCH RBC QN AUTO: 32.8 PG (ref 27–33)
MCHC RBC AUTO-ENTMCNC: 33.7 G/DL (ref 33.6–35)
MCV RBC AUTO: 97.2 FL (ref 81.4–97.8)
PLATELET # BLD AUTO: 178 K/UL (ref 164–446)
PMV BLD AUTO: 10.4 FL (ref 9–12.9)
RBC # BLD AUTO: 3.63 M/UL (ref 4.2–5.4)
WBC # BLD AUTO: 21.7 K/UL (ref 4.8–10.8)

## 2017-10-10 PROCEDURE — 700102 HCHG RX REV CODE 250 W/ 637 OVERRIDE(OP): Performed by: OBSTETRICS & GYNECOLOGY

## 2017-10-10 PROCEDURE — A9270 NON-COVERED ITEM OR SERVICE: HCPCS | Performed by: OBSTETRICS & GYNECOLOGY

## 2017-10-10 PROCEDURE — 700112 HCHG RX REV CODE 229: Performed by: OBSTETRICS & GYNECOLOGY

## 2017-10-10 PROCEDURE — 36415 COLL VENOUS BLD VENIPUNCTURE: CPT

## 2017-10-10 PROCEDURE — 770002 HCHG ROOM/CARE - OB PRIVATE (112)

## 2017-10-10 PROCEDURE — 85027 COMPLETE CBC AUTOMATED: CPT

## 2017-10-10 RX ORDER — FUROSEMIDE 20 MG/1
20 TABLET ORAL ONCE
Status: COMPLETED | OUTPATIENT
Start: 2017-10-10 | End: 2017-10-10

## 2017-10-10 RX ADMIN — FUROSEMIDE 20 MG: 20 TABLET ORAL at 13:28

## 2017-10-10 RX ADMIN — Medication 1 TABLET: at 08:37

## 2017-10-10 RX ADMIN — DOCUSATE SODIUM 100 MG: 100 CAPSULE ORAL at 08:37

## 2017-10-10 ASSESSMENT — PAIN SCALES - GENERAL
PAINLEVEL_OUTOF10: 0

## 2017-10-10 ASSESSMENT — PATIENT HEALTH QUESTIONNAIRE - PHQ9
SUM OF ALL RESPONSES TO PHQ9 QUESTIONS 1 AND 2: 0
SUM OF ALL RESPONSES TO PHQ QUESTIONS 1-9: 0
1. LITTLE INTEREST OR PLEASURE IN DOING THINGS: NOT AT ALL
2. FEELING DOWN, DEPRESSED, IRRITABLE, OR HOPELESS: NOT AT ALL

## 2017-10-10 NOTE — PROGRESS NOTES
Pt unable to properly place prosthetic leg due to swelling. Discussed with . New order received for PO Lasix 20mg, once. Discussed with pt.

## 2017-10-10 NOTE — PROGRESS NOTES
171: , viable female per Dr. Musa, baby with 8/9 APGARS.  : Report given to Kathryn Falcon RN, pt in stable condition with a firm fundus and light lochia

## 2017-10-10 NOTE — CARE PLAN
Problem: Pain Management  Goal: Pain level will decrease to patient's comfort goal    Intervention: Follow pain managment plan developed in collaboration with patient and Interdisciplinary Team  Pain well controlled at this time per pt.       Problem: Altered physiologic condition related to immediate post-delivery state and potential for bleeding/hemorrhage  Goal: Patient physiologically stable as evidenced by normal lochia, palpable uterine involution and vital signs within normal limits    Intervention: Perform physical assessment and obtain vital signs on patient as directed in Intrapartum/Postpartum Standard of Care in Policy and Procedure manual  Fundus firm, lochia light, education done on s/s of hemorrhage, pt verbalizes an understanding

## 2017-10-10 NOTE — PROGRESS NOTES
1900: Report received from ROXANN Phillip RN. POC discussed with pt and family.   1950: Pt ambulated to bathroom with assistance. Voided.   2005: Pt and infant transferred to postpartum in stable condition. ID bands checked. Cuddles activated.

## 2017-10-10 NOTE — DELIVERY NOTE
DATE OF SERVICE:  10/09/2017    DATE OF DELIVERY:  10/09/2017    TYPE OF DELIVERY:  Spontaneous vaginal delivery.    HISTORY OF PRESENT ILLNESS:  Patient is a 41-year-old primigravida female, due   approximately 1 week ago, who presented to labor and delivery in labor.  She   was initially 1-2 cm dilated and progressed to 3 cm dilated.  She was admitted   by Dr. Schneider.  By around midnight, she was 4-5 cm dilated and had an   epidural anesthetic for pain relief.  She had arrest of labor at this point   and was still 4-5 cm dilated 5-6 hours later, but had refused Pitocin or   amniotomy.  I assumed her care at that time and she agreed to the amniotomy,   which was done for clear fluid.  An intrauterine pressure monitor was placed,   which showed poor quality contractions; and therefore, Pitocin augmentation   was also instituted.  She made good progress reaching complete dilation by   around noon.  She pushed for about an hour and a half and because she was   having too much abdominal pain according to her to push any further, she   refused to push at this time.  At this time, the baby was at +3 station and   the fetal heart rate tracing was category 1.  It was apparent from the   intrauterine pressure monitor that she was not pushing effectively and I   agreed to let her have her epidural re-dosed, so she could get comfortable and   push again.  She started pushing again approximately half an hour to an hour   after this, and according to her, was pushing more effectively.  She had   brought the vertex down to where she started to crown the scalp 2-3 cm, but   this was 5 hours after her initial complete dilation.  It was then noted that   there was moderate meconium and started to become fetal tachycardia.  The   patient again refused to push and wanted to have the epidural re-dosed, which   I explained to her that will not be possible at this time and I suggested she   either put better maternal effort or attempt  vacuum extractor, which she   initially refused.  I explained to her that this could have an adverse effect   on her baby; she called me back in the room within a few minutes to discuss   the vacuum, but pushed 2 times with more effective effort and delivered on her   own spontaneously and vaginally in an occiput anterior position, a healthy   female infant with no evidence of nuchal cord.  The baby was bulb suctioned on   the perineum and the nurse personnel along respiratory therapy was in   attendance.  Baby had a good cry and Apgars of 8 and 9.  Therefore, there was   no inspection below the cords.  There was a first-degree perineal injury,   repaired with 3-0 Vicryl.  The placenta delivered within 5-10 minutes.    Estimated blood loss was less than 200 mL.  There were no complications noted.       ____________________________________     MD GEORGINA KEN / PACO    DD:  10/09/2017 17:28:08  DT:  10/09/2017 20:36:28    D#:  [id number]  Job#:  [id number]

## 2017-10-10 NOTE — PROGRESS NOTES
Kathryn RN brought pt from labor and delivery. ID bands and cuddles checked and verified with labor and delivery nurse, Kathryn. Patient oriented to room and surroundings. Skylight discussed. Bulb syring demonstration. Educated on emergency call light. ID bands and security issues discussed. Educated about the pink photo ID name badges. Educated about not sleeping with infant, no carrying infant in halls, and no leaving infant unattended. Assessment completed on patient. WDL. Discussed pain management. IV without redness and swelling. Family at bedside. Patient was assessed to restroom, pt able to ambulate safely. Pt able to void. Encourage pt to call for any needs.

## 2017-10-10 NOTE — FLOWSHEET NOTE
Called to a 41 wk delivery. Infant positive for moderste amt of thick meconium. Dr flavio suctioned on the perineum. Infant was vigorous, with HR> 100. Suctioned orally for small amts of meconium. Breath sounds clear bilaterally. APGARS 8,9. No other intervention required. RN continued transition with RT to follow as needed.

## 2017-10-10 NOTE — CARE PLAN
Problem: Altered physiologic condition related to immediate post-delivery state and potential for bleeding/hemorrhage  Goal: Patient physiologically stable as evidenced by normal lochia, palpable uterine involution and vital signs within normal limits  Outcome: PROGRESSING AS EXPECTED  Fundus firm. Lochia light  .     Problem: Potential for postpartum infection related to presence of episiotomy/vaginal tear and/or uterine contamination  Goal: Patient will be absent from signs and symptoms of infection  Outcome: PROGRESSING AS EXPECTED  Patient is afebrile. No signs and symptoms of infection noted.

## 2017-10-10 NOTE — PROGRESS NOTES
PP#1    Pt feels well, minimal pain, hasnt slept much since delivery last pm    VS afebrile  abd fundus firm and nontender  Gyn light flow    Stable post partum    Discharge home in am

## 2017-10-10 NOTE — DISCHARGE PLANNING
:    Referral: History of depression, Ewings sarcoma, and a BKA.  May need resources.    Intervention:  Reviewed medical record and met with VAIBHAV who delivered her first baby.  VAIBHAV stated she is no longer in a relationship with the FOB and her significant other-Quinn Bang is involved and supportive.  VAIBHAV lives at [address] [place] [address].  Phone number is [phone].      VAIBHAV states she is prepared for infant and denies any needs.  Provided VAIBHAV with a children's resource list and a WIC application.  MOB did ask for information on legal services because of the custody situation with the FOB.  Discussed [employer] and VAIBHAV stated she was already aware of them and had contacted them.  Also notified VAIBHAV of [facility].      VAIBHAV denies any current symptoms of depression and is aware of post partum depression.  She plans to follow up with her doctor as needed.    Plan:  Resources provided.  Cleared for discharge per .

## 2017-10-10 NOTE — PROGRESS NOTES
Assisted mom with breastfeeding. Breast slightly firm, areola dense, nipples flatten with compression. Baby unable to latch. Mom consistently pumping q 3 hrs. And unable to transfer any milk. Unable to express colostrum manually at this visit. Mom is syringe feeding donor milk at this time.

## 2017-10-10 NOTE — PROGRESS NOTES
Pt has been complete for 5 hrs. She refused to push earlier till epidural redosed. She has now been pushing for almost 3 hs and is again, going back to refusing to push because of abdominal pain. . I discussed options including vacuum.  She is pushing very poorly according to IUPC pressures. She is  3-4cm of scalp    She is now showing moderate meconium and baby is getting tachycadic, understands that delay in delivery may adversely affect infant. She is well informed and refusing medical advice.

## 2017-10-11 VITALS
SYSTOLIC BLOOD PRESSURE: 98 MMHG | TEMPERATURE: 97.2 F | DIASTOLIC BLOOD PRESSURE: 55 MMHG | RESPIRATION RATE: 18 BRPM | HEART RATE: 92 BPM | OXYGEN SATURATION: 96 % | BODY MASS INDEX: 27.3 KG/M2 | HEIGHT: 71 IN | WEIGHT: 195 LBS

## 2017-10-11 PROCEDURE — 700102 HCHG RX REV CODE 250 W/ 637 OVERRIDE(OP): Performed by: OBSTETRICS & GYNECOLOGY

## 2017-10-11 PROCEDURE — A9270 NON-COVERED ITEM OR SERVICE: HCPCS | Performed by: OBSTETRICS & GYNECOLOGY

## 2017-10-11 PROCEDURE — 700112 HCHG RX REV CODE 229: Performed by: OBSTETRICS & GYNECOLOGY

## 2017-10-11 RX ORDER — IBUPROFEN 600 MG/1
600 TABLET ORAL EVERY 6 HOURS PRN
Qty: 30 TAB | Refills: 1 | Status: SHIPPED | OUTPATIENT
Start: 2017-10-11 | End: 2018-03-30

## 2017-10-11 RX ORDER — PSEUDOEPHEDRINE HCL 30 MG
100 TABLET ORAL 2 TIMES DAILY PRN
Qty: 60 CAP | Refills: 1 | Status: SHIPPED | OUTPATIENT
Start: 2017-10-11 | End: 2018-03-30

## 2017-10-11 RX ADMIN — Medication 1 TABLET: at 08:51

## 2017-10-11 RX ADMIN — DOCUSATE SODIUM 100 MG: 100 CAPSULE ORAL at 08:51

## 2017-10-11 ASSESSMENT — PAIN SCALES - GENERAL
PAINLEVEL_OUTOF10: 1
PAINLEVEL_OUTOF10: 1
PAINLEVEL_OUTOF10: 0

## 2017-10-11 NOTE — PROGRESS NOTES
Hospital Day : 3    S: fdoing well; suhail diet; min bleed    O:  Vitals:    10/10/17 0000 10/10/17 0400 10/10/17 0800 10/10/17 2000   BP: (!) 92/50 (!) 92/60 101/62 121/67   Pulse: 86 85 91 99   Resp:    Temp: 36.8 °C (98.3 °F) 36.9 °C (98.5 °F) 36.7 °C (98.1 °F) 37.2 °C (98.9 °F)   TempSrc:       SpO2: 96% 95% 99% 96%   Weight:       Height:           Recent Labs      10/08/17   2206  10/10/17   0546   WBC  14.9*  21.7*   RBC  4.54  3.63*   HEMOGLOBIN  14.6  11.9*   HEMATOCRIT  42.7  35.3*   MCV  94.1  97.2   MCH  32.2  32.8   MCHC  34.2  33.7   RDW  47.8  49.9   PLATELETCT  224  178   MPV  10.5  10.4             abd soft ff    A: pp 2 sp  doing well    P: dc

## 2017-10-11 NOTE — PROGRESS NOTES
Lactation note:     Per RN request to see couplet. Initial visit.   Breasts appear swollen, and nipples are full. Unable to hand express colostrum.  Attempted some reverse pressure softening, infant not able to latch.      Discussed normal  behaviors and normal course of breastfeeding at 12- 48 hours, and what to expect. Discussed importance of offering breast every 2-3 hours, and even if infant shows no interest, can do hand expression into infant's lips. Encouraged to continue doing skin to skin. Discussed signs of a good latch, voiding and stooling patterns, feeding cues, stomach size, and importance of establishing milk supply with frequency of feedings.        Plan to have MOB do breast massage, and reverse pressure softening 10 minutes prior to latching infant. If no latch is sustained, then to start supplementing, and pump to start stimulation. MOB prefers to feed infant with syringe.      Discussed with LAVERN Montelongo

## 2017-10-11 NOTE — DISCHARGE SUMMARY
DATE OF ADMISSION:  10/08/2017    DATE OF DELIVERY:  10/09/2017    DATE OF DISCHARGE:  10/11/2017    The patient is a 41-year-old  1, para 0 who was admitted at 40-6/7   weeks complaining of uterine contractions.  Her cervix was 3, 80, -2.  She   received an epidural for pain control, was AROM clear at 6 cm and progressed   over normal labor curve to complete with an overall reassuring fetal heart   tracing.  After being complete for 5 hours, the baby passed meconium and   became tachycardic and eventually she pushed the baby out without   complication.    FINDINGS:  Include a female infant with Apgars of 8 and 9.  Patient and baby   recovered in stable condition.  On postpartum day #1, she was doing well   without complaint, tolerating regular diet.  Today, postpartum day #2, she   desires discharge home.  She is afebrile.  Her vitals are within normal   limits.  Her abdomen is soft with full fundus below the umbilicus.    ASSESSMENT:  At this time is postpartum day 2, status post spontaneous vaginal   delivery, doing well, desires discharge home.    PLAN:  At this time:  1.  Discharge home.  2.  Follow up in 6 weeks.  3.  Pelvic rest.  4.  Lift precautions.  5.  Scripts for Motrin and Colace written.       ____________________________________     MD FELICITA Rogel / PACO    DD:  10/11/2017 07:06:07  DT:  10/11/2017 07:24:40    D#:  [id number]  Job#:  [id number]

## 2017-10-11 NOTE — CARE PLAN
Problem: Altered physiologic condition related to immediate post-delivery state and potential for bleeding/hemorrhage  Goal: Patient physiologically stable as evidenced by normal lochia, palpable uterine involution and vital signs within normal limits  Outcome: PROGRESSING AS EXPECTED  Fundus firm, lochia light, VSS.    Problem: Alteration in comfort related to episiotomy, vaginal repair and/or after birth pains  Goal: Patient is able to ambulate, care for self and infant  Outcome: PROGRESSING AS EXPECTED  Pt ambulating and caring for self and infant independently, denies need for pain medication.

## 2017-10-11 NOTE — PROGRESS NOTES
1900- Bedside report received, assumed care of patient.  Pt sitting up in bed with NAD, denies needs at this time.  2000- Pt assessment complete, wnl.  Pt ambulating and voiding without difficulty.  Bonding well with infant, working on breast feeding.  Using breast pump and attempting to breast feed at each feeding.  Plan of care discussed with patient for the night.  Pt denies need for pain medication, but will ask RN if she needs anything tonight.  Questions answered.  Encouraged to call with needs.   2245- Pt requests that infant go to nbn for a couple hours so she can rest.

## 2017-10-11 NOTE — PROGRESS NOTES
Breastfeeding essentials pamphlet given, and reviewed.      HG pump is in room, VAIBHAV has been pumping for breast stimulation.    Plan is to be discharged today, so  Discussed discharge feeding plan-   1- To breastfeed first.  2- if latch or breastfeeding is suboptimal, can supplement according to guidelines. (Volumes reviewed per infant birthweight)  3. Use breastpump to protect supply.     VAIBHAV has [facility], and is aware to get her insurance issued pump from [employer]. Also discussed her lactation benefits available to her as outpatient.     VAIBHAV has no other questions or concerns regarding breastfeeding. Encouraged to call for assistance if needed with latch.

## 2017-10-11 NOTE — CARE PLAN
Problem: Alteration in comfort related to episiotomy, vaginal repair and/or after birth pains  Goal: Patient verbalizes acceptable pain level  Outcome: PROGRESSING AS EXPECTED  Patient states pain is tolerable with pain medication.  Will continue to reassess with hourly rounding and medicate accordingly to 0-10 pain scale.    Problem: Potential knowledge deficit related to lack of understanding of self and  care  Goal: Patient will demonstrate ability to care for self and infant  Outcome: PROGRESSING AS EXPECTED  Patient is able to care for self and baby.  Discharge instructions reviewed and patient has no questions.

## 2017-10-11 NOTE — PROGRESS NOTES
Discharge teaching reviewed with patient, all questions answered. Pt given all written information on self and infant care, including prescriptions and follow-up instructions. Pt doing well with infant, and feels comfortable with her feeding plan.  Discharged to home at 1110. Escorted out in wheelchair by staff.

## 2017-10-11 NOTE — PROGRESS NOTES
0945- Discharge education discussed with patient.  Patient verbalized understanding.  Prescriptions given to patient.  Patient verbalized understanding of follow up appointments for herself and infant.

## 2017-10-11 NOTE — DISCHARGE INSTRUCTIONS
POSTPARTUM DISCHARGE INSTRUCTIONS FOR MOM    YOB: 1975   Age: 41 y.o.               Admit Date: 10/8/2017     Discharge Date: 10/11/2017  Attending Doctor:  Regulo Musa M.D.                  Allergies:  Seasonal    Discharged to home by car. Discharged via wheelchair, hospital escort: Yes.  Special equipment needed: Not Applicable  Belongings with: Personal  Be sure to schedule a follow-up appointment with your primary care doctor or any specialists as instructed.     Discharge Plan:   Diet Plan: Discussed  Activity Level: Discussed  Confirmed Follow up Appointment: Patient to Call and Schedule Appointment  Confirmed Symptoms Management: Discussed  Medication Reconciliation Updated: Yes  Influenza Vaccine Indication: Patient Refuses    REASONS TO CALL YOUR OBSTETRICIAN:  1.   Persistent fever or shaking chills (Temperature higher than 100.4)  2.   Heavy bleeding (soaking more than 1 pad per hour); Passing clots  3.   Foul odor from vagina  4.   Mastitis (Breast infection; breast pain, chills, fever, redness)  5.   Urinary pain, burning or frequency  6.   Episiotomy infection  7.   Abdominal incision infection  8.   Severe depression longer than 24 hours    HAND WASHING  · Prior to handling the baby.  · Before breastfeeding or bottle feeding baby.  · After using the bathroom or changing the baby's diaper.    WOUND CARE  Ask your physician for additional care instructions.  In general:    ·  Incision:      · Keep clean and dry.    · Do NOT lift anything heavier than your baby for up to 6 weeks.    · There should not be any opening or pus.      VAGINAL CARE  · Nothing inside vagina for 6 weeks: no sexual intercourse, tampons or douching.  · Bleeding may continue for 2-4 weeks.  Amount may vary.    · Call your physician for heavy bleeding which means soaking more than 1 pad per hour    BIRTH CONTROL  · It is possible to become pregnant at any time after delivery and while breastfeeding.  · Plan  "to discuss a method of birth control with your physician at your follow up visit. visit.    DIET AND ELIMINATION  · Eating more fiber (bran cereal, fruits, and vegetables) and drinking plenty of fluids will help to avoid constipation.  · Urinary frequency after childbirth is normal.    POSTPARTUM BLUES  During the first few days after birth, you may experience a sense of the \"blues\" which may include impatience, irritability or even crying.  These feeling come and go quickly.  However, as many as 1 in 10 women experience emotional symptoms known as postpartum depression.    Postpartum depression:  May start as early as the second or third day after delivery or take several weeks or months to develop.  Symptoms of \"blues\" are present, but are more intense:  Crying spells; loss of appetite; feelings of hopelessness or loss of control; fear of touching the baby; over concern or no concern at all about the baby; little or no concern about your own appearance/caring for yourself; and/or inability to sleep or excessive sleeping.  Contact your physician if you are experiencing any of these symptoms.    Crisis Hotline:  · [place] Crisis Hotline:  0-116-OZLSKYU  Or 1-234.593.9872  · [place] Crisis Hotline:  1-818.874.6574  Or 056-449-7682    PREVENTING SHAKEN BABY:  If you are angry or stressed, PUT THE BABY IN THE CRIB, step away, take some deep breaths, and wait until you are calm to care for the baby.  DO NOT SHAKE THE BABY.  You are not alone, call a supporter for help.    · Crisis Call Center 24/7 crisis line 560-115-9734 or 1-331.385.8043  · You can also text them, text \"ANSWER\" to 853373    QUIT SMOKING/TOBACCO USE:  I understand the use of any tobacco products increases my chance of suffering from future heart disease and could cause other illnesses which may shorten my life. Quitting the use of tobacco products is the single most important thing I can do to improve my health. For further information on smoking / " tobacco cessation call a Toll Free Quit Line at 1-556.320.3152 (*[employer] [place]) or 1-802.183.4436 (American Lung Association) or you can access the web based program at www.lungusa.org.    · [place] Tobacco Users Help Line:  (528) 726-1298       Toll Free: 1-711.398.3600  · Quit Tobacco Program [facility] (284)557-2785    DEPRESSION / SUICIDE RISK:  As you are discharged from this [facility], it is important to learn how to keep safe from harming yourself.    Recognize the warning signs:  · Abrupt changes in personality, positive or negative- including increase in energy   · Giving away possessions  · Change in eating patterns- significant weight changes-  positive or negative  · Change in sleeping patterns- unable to sleep or sleeping all the time   · Unwillingness or inability to communicate  · Depression  · Unusual sadness, discouragement and loneliness  · Talk of wanting to die  · Neglect of personal appearance   · Rebelliousness- reckless behavior  · Withdrawal from people/activities they love  · Confusion- inability to concentrate     If you or a loved one observes any of these behaviors or has concerns about self-harm, here's what you can do:  · Talk about it- your feelings and reasons for harming yourself  · Remove any means that you might use to hurt yourself (examples: pills, rope, extension cords, firearm)  · Get professional help from the community (Mental Health, Substance Abuse, psychological counseling)  · Do not be alone:Call your Safe Contact- someone whom you trust who will be there for you.  · Call your local CRISIS HOTLINE 250-7142 or 697-838-0035  · Call your local Children's Mobile Crisis Response Team [place] (039) 860-9343 or www.[employer]  · Call the toll free National Suicide Prevention Hotlines   · National Suicide Prevention Lifeline 277-811-JCAT (9784)  · National Hope Line Network 800-SUICIDE (782-7291)    DISCHARGE  SURVEY:  Thank you for choosing [facility].  We hope we provided you with very good care.  You may be receiving a survey in the mail.  Please fill it out.  Your opinion is valuable to us.    ADDITIONAL EDUCATIONAL MATERIALS GIVEN TO PATIENT:  D/C Home With Pelvic Rest for six (6) weeks   Follow up appt in 6 weeks     My signature on this form indicates that:  1.  I have reviewed and understand the above information  2.  My questions regarding this information have been answered to my satisfaction.  3.  I have formulated a plan with my discharge nurse to obtain my prescribed medication for home.

## 2017-11-06 ENCOUNTER — OFFICE VISIT (OUTPATIENT)
Dept: MEDICAL GROUP | Facility: MEDICAL CENTER | Age: 42
End: 2017-11-06
Payer: COMMERCIAL

## 2017-11-06 VITALS
OXYGEN SATURATION: 98 % | HEIGHT: 71 IN | HEART RATE: 83 BPM | DIASTOLIC BLOOD PRESSURE: 62 MMHG | BODY MASS INDEX: 25.2 KG/M2 | WEIGHT: 180 LBS | TEMPERATURE: 98.5 F | SYSTOLIC BLOOD PRESSURE: 94 MMHG

## 2017-11-06 DIAGNOSIS — M54.41 ACUTE RIGHT-SIDED LOW BACK PAIN WITH RIGHT-SIDED SCIATICA: ICD-10-CM

## 2017-11-06 PROCEDURE — 99213 OFFICE O/P EST LOW 20 MIN: CPT | Performed by: NURSE PRACTITIONER

## 2017-11-06 RX ORDER — SUMATRIPTAN 50 MG/1
50 TABLET, FILM COATED ORAL
COMMUNITY
End: 2018-03-30

## 2017-11-06 NOTE — PROGRESS NOTES
Subjective:     Chief Complaint   Patient presents with   • Hip Pain     low back into hip and buttocks.      Marcela Tiwari is a 41 y.o. female here today to follow up on:    Acute right-sided low back pain with right-sided sciatica  Had child 4 weeks ago  Last week began having low back pain, worsened over the weekend- Sat and Sunday she could not get comfortable, was having significant pain up to a 7 out of 10 at the time. Pain was radiating down the posterior right leg  Today somewhat better, 5/10  She's been alternating Tylenol and ibuprofen for the last few days  No specific history of injury although she does report having intermittent issues with her back for quite some time. 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 She is seeing a chiropractor on a regular basis.  She denies numbness, tingling, weakness in the right leg. No change in bowel or bladder function, no saddle anesthesia       Current medicines (including changes today)  Current Outpatient Prescriptions   Medication Sig Dispense Refill   • sumatriptan (IMITREX) 50 MG Tab Take 50 mg by mouth Once PRN for Migraine.     • fluticasone (FLONASE) 50 MCG/ACT nasal spray Spray 2 Sprays in nose every day. 16 g 5   • ibuprofen (MOTRIN) 600 MG Tab Take 1 Tab by mouth every 6 hours as needed (For cramping after delivery; do not give if patient is receiving ketorolac (Toradol)). 30 Tab 1   • docusate sodium 100 MG Cap Take 100 mg by mouth 2 times a day as needed for Constipation. 60 Cap 1   • Prenatal MV-Min-Fe Fum-FA-DHA (PRENATAL 1 PO) Take  by mouth.     • loratadine (CLARITIN) 10 MG Tab Take 10 mg by mouth every day.     • acetaminophen (TYLENOL) 500 MG Tab Take 500-1,000 mg by mouth every 6 hours as needed.     • montelukast (SINGULAIR) 10 MG Tab Take 1 Tab by mouth every day. 30 Tab 1     No current  "facility-administered medications for this visit.      She  has a past medical history of Cancer (CMS-[facility]) (2006) and Headache, classical migraine.    ROS included above     Objective:     Blood pressure (!) 94/62, pulse 83, temperature 36.9 °C (98.5 °F), height 1.803 m (5' 11\"), weight 81.6 kg (180 lb), SpO2 98 %. Body mass index is 25.1 kg/m².     Physical Exam:  General: Alert, oriented in no acute distress.  Eye contact is good, speech is normal, affect calm  Lungs: clear to auscultation bilaterally, normal effort, no wheeze/ rhonchi/ rales.  CV: regular rate and rhythm, S1, S2, no murmur  MS: No point tenderness over the lumbar spine, no obvious deformity. No point tenderness over the right hip joint, normal range of motion in the hip. Negative straight leg raise bilaterally. Left leg with prosthesis  Ext: no edema, color normal, vascularity normal, temperature normal    Assessment and Plan:   The following treatment plan was discussed   1. Acute right-sided low back pain with right-sided sciatica  Somewhat better today as compared to the weekend. She is alternating Tylenol and ibuprofen. Short course of oral corticosteroids discussed, she would prefer to hold off at this time. Will continue Tylenol and ibuprofen, printed exercises given, ice or heat application and encouraged. She will see her chiropractor as planned. Notify me if not gradually resolving        Followup: As needed         Please note that this dictation was created using voice recognition software. I have worked with consultants from the vendor as well as technical experts from [facility] to optimize the interface. I have made every reasonable attempt to correct obvious errors, but I expect that there are errors of grammar and possibly content that I did not discover before finalizing the note.       "

## 2017-11-06 NOTE — ASSESSMENT & PLAN NOTE
Had child 4 weeks ago  Last week began having low back pain, worsened over the weekend- Sat and Sunday she could not get comfortable, was having significant pain up to a 7 out of 10 at the time. Pain was radiating down the posterior right leg  Today somewhat better, 5/10  She's been alternating Tylenol and ibuprofen for the last few days  No specific history of injury although she does report having intermittent issues with her back for quite some time. 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 She is seeing a chiropractor on a regular basis.  She denies numbness, tingling, weakness in the right leg. No change in bowel or bladder function, no saddle anesthesia

## 2017-12-04 ENCOUNTER — TELEPHONE (OUTPATIENT)
Dept: HEMATOLOGY ONCOLOGY | Facility: MEDICAL CENTER | Age: 42
End: 2017-12-04

## 2017-12-04 NOTE — TELEPHONE ENCOUNTER
I called and spoke with the patient regarding her of transfer care from Dr Figueredo to Dr. Michaud or Dr. Sidhu. Since Dr Figueredo is no longer with [facility].She stated she has been in [place] for 10 years and her next Ct scan is not due until summer of 2019 and she is going to to follow up with her PCP

## 2018-03-07 ENCOUNTER — OFFICE VISIT (OUTPATIENT)
Dept: MEDICAL GROUP | Facility: MEDICAL CENTER | Age: 43
End: 2018-03-07
Payer: COMMERCIAL

## 2018-03-07 ENCOUNTER — HOSPITAL ENCOUNTER (OUTPATIENT)
Dept: LAB | Facility: MEDICAL CENTER | Age: 43
End: 2018-03-07
Attending: CLINICAL NURSE SPECIALIST
Payer: COMMERCIAL

## 2018-03-07 VITALS
OXYGEN SATURATION: 97 % | WEIGHT: 185 LBS | HEIGHT: 71 IN | DIASTOLIC BLOOD PRESSURE: 68 MMHG | SYSTOLIC BLOOD PRESSURE: 98 MMHG | BODY MASS INDEX: 25.9 KG/M2 | TEMPERATURE: 97.1 F | HEART RATE: 68 BPM

## 2018-03-07 DIAGNOSIS — F32.0 MILD SINGLE CURRENT EPISODE OF MAJOR DEPRESSIVE DISORDER (HCC): ICD-10-CM

## 2018-03-07 PROBLEM — M54.41 ACUTE RIGHT-SIDED LOW BACK PAIN WITH RIGHT-SIDED SCIATICA: Status: RESOLVED | Noted: 2017-11-06 | Resolved: 2018-03-07

## 2018-03-07 PROBLEM — J02.9 PHARYNGITIS: Status: RESOLVED | Noted: 2017-05-07 | Resolved: 2018-03-07

## 2018-03-07 PROBLEM — F32.9 MAJOR DEPRESSIVE DISORDER: Status: ACTIVE | Noted: 2018-03-07

## 2018-03-07 LAB
ERYTHROCYTE [DISTWIDTH] IN BLOOD BY AUTOMATED COUNT: 43.8 FL (ref 35.9–50)
ESTRADIOL SERPL-MCNC: 86 PG/ML
FSH SERPL-ACNC: 54 MIU/ML
HCT VFR BLD AUTO: 45 % (ref 37–47)
HGB BLD-MCNC: 14.8 G/DL (ref 12–16)
MCH RBC QN AUTO: 31.8 PG (ref 27–33)
MCHC RBC AUTO-ENTMCNC: 32.9 G/DL (ref 33.6–35)
MCV RBC AUTO: 96.6 FL (ref 81.4–97.8)
PLATELET # BLD AUTO: 230 K/UL (ref 164–446)
PMV BLD AUTO: 10.4 FL (ref 9–12.9)
RBC # BLD AUTO: 4.66 M/UL (ref 4.2–5.4)
T4 SERPL-MCNC: 6 UG/DL (ref 4–12)
TSH SERPL DL<=0.005 MIU/L-ACNC: 0.74 UIU/ML (ref 0.38–5.33)
WBC # BLD AUTO: 6.8 K/UL (ref 4.8–10.8)

## 2018-03-07 PROCEDURE — 85027 COMPLETE CBC AUTOMATED: CPT

## 2018-03-07 PROCEDURE — 84436 ASSAY OF TOTAL THYROXINE: CPT

## 2018-03-07 PROCEDURE — 83001 ASSAY OF GONADOTROPIN (FSH): CPT

## 2018-03-07 PROCEDURE — 99214 OFFICE O/P EST MOD 30 MIN: CPT | Performed by: FAMILY MEDICINE

## 2018-03-07 PROCEDURE — 82670 ASSAY OF TOTAL ESTRADIOL: CPT

## 2018-03-07 PROCEDURE — 84443 ASSAY THYROID STIM HORMONE: CPT

## 2018-03-07 RX ORDER — BUPROPION HYDROCHLORIDE 100 MG/1
100 TABLET ORAL 2 TIMES DAILY
Qty: 60 TAB | Refills: 5 | Status: SHIPPED | OUTPATIENT
Start: 2018-03-07 | End: 2018-03-28

## 2018-03-07 ASSESSMENT — PATIENT HEALTH QUESTIONNAIRE - PHQ9
5. POOR APPETITE OR OVEREATING: 1 - SEVERAL DAYS
CLINICAL INTERPRETATION OF PHQ2 SCORE: 3
SUM OF ALL RESPONSES TO PHQ QUESTIONS 1-9: 10

## 2018-03-08 NOTE — ASSESSMENT & PLAN NOTE
Patient has a 5-month-old baby at home, this is her first baby. She is very thankful to have this baby and denies any difficulty with attachment. She still motivated to take care of the baby. She is not breast-feeding.  Patient has history of being on Cymbalta when she is going through cancer treatment. She states that she had decreased libido with Cymbalta, so she is not interested in restarting Cymbalta.  Patient states that sleep deprivation, busy at work, busy at home with the baby, has been working all night so she does not see him much, have caused these symptoms. She is irritable and lacks motivation and energy.    Depression Screening    Little interest or pleasure in doing things?  2 - more than half the days  Feeling down, depressed , or hopeless? 1 - several days  Trouble falling or staying asleep, or sleeping too much?  1 - several days  Feeling tired or having little energy?  3 - nearly every day  Poor appetite or overeating?  1 - several days  Feeling bad about yourself - or that you are a failure or have let yourself or your family down? 1 - several days  Trouble concentrating on things, such as reading the newspaper or watching television? 1 - several days  Moving or speaking so slowly that other people could have noticed.  Or the opposite - being so fidgety or restless that you have been moving around a lot more than usual?  0 - not at all  Thoughts that you would be better off dead, or of hurting yourself?  0 - not at all  Patient Health Questionnaire Score: 10      If depressive symptoms identified deferred to follow up visit unless specifically addressed in assesment and plan.    Interpretation of PHQ-9 Total Score   Score Severity   1-4 No Depression   5-9 Mild Depression   10-14 Moderate Depression   15-19 Moderately Severe Depression   20-27 Severe Depression

## 2018-03-08 NOTE — PROGRESS NOTES
Subjective:   Marcela Tiwari is a 42 y.o. female here today for depression    Major depressive disorder  Patient has a 5-month-old baby at home, this is her first baby. She is very thankful to have this baby and denies any difficulty with attachment. She still motivated to take care of the baby. She is not breast-feeding.  Patient has history of being on Cymbalta when she is going through cancer treatment. She states that she had decreased libido with Cymbalta, so she is not interested in restarting Cymbalta.  Patient states that sleep deprivation, busy at work, busy at home with the baby, has been working all night so she does not see him much, have caused these symptoms. She is irritable and lacks motivation and energy.    Depression Screening    Little interest or pleasure in doing things?  2 - more than half the days  Feeling down, depressed , or hopeless? 1 - several days  Trouble falling or staying asleep, or sleeping too much?  1 - several days  Feeling tired or having little energy?  3 - nearly every day  Poor appetite or overeating?  1 - several days  Feeling bad about yourself - or that you are a failure or have let yourself or your family down? 1 - several days  Trouble concentrating on things, such as reading the newspaper or watching television? 1 - several days  Moving or speaking so slowly that other people could have noticed.  Or the opposite - being so fidgety or restless that you have been moving around a lot more than usual?  0 - not at all  Thoughts that you would be better off dead, or of hurting yourself?  0 - not at all  Patient Health Questionnaire Score: 10      If depressive symptoms identified deferred to follow up visit unless specifically addressed in assesment and plan.    Interpretation of PHQ-9 Total Score   Score Severity   1-4 No Depression   5-9 Mild Depression   10-14 Moderate Depression   15-19 Moderately Severe Depression   20-27 Severe Depression           Current  "medicines (including changes today)  Current Outpatient Prescriptions   Medication Sig Dispense Refill   • buPROPion (WELLBUTRIN) 100 MG Tab Take 1 Tab by mouth 2 times a day. 60 Tab 5   • sumatriptan (IMITREX) 50 MG Tab Take 50 mg by mouth Once PRN for Migraine.     • ibuprofen (MOTRIN) 600 MG Tab Take 1 Tab by mouth every 6 hours as needed (For cramping after delivery; do not give if patient is receiving ketorolac (Toradol)). 30 Tab 1   • docusate sodium 100 MG Cap Take 100 mg by mouth 2 times a day as needed for Constipation. 60 Cap 1   • Prenatal MV-Min-Fe Fum-FA-DHA (PRENATAL 1 PO) Take  by mouth.     • loratadine (CLARITIN) 10 MG Tab Take 10 mg by mouth every day.     • acetaminophen (TYLENOL) 500 MG Tab Take 500-1,000 mg by mouth every 6 hours as needed.     • montelukast (SINGULAIR) 10 MG Tab Take 1 Tab by mouth every day. 30 Tab 1   • fluticasone (FLONASE) 50 MCG/ACT nasal spray Spray 2 Sprays in nose every day. 16 g 5     No current facility-administered medications for this visit.      She  has a past medical history of Cancer (CMS-[facility]) (2006) and Headache, classical migraine.    ROS   + fatigue, no suicidal ideation, no shortness of breath       Objective:     Blood pressure (!) 98/68, pulse 68, temperature 36.2 °C (97.1 °F), height 1.803 m (5' 11\"), weight 83.9 kg (185 lb), last menstrual period 02/14/2018, SpO2 97 %, not currently breastfeeding. Body mass index is 25.8 kg/m².   Physical Exam:  Constitutional: Alert, no distress.  Skin: Warm, dry, good turgor, no rashes in visible areas.  Eye: Equal, round and reactive, conjunctiva clear, lids normal.  Psych: Alert and oriented x3, normal affect and mood.        Assessment and Plan:   The following treatment plan was discussed    1. Mild single current episode of major depressive disorder (CMS-[facility])  Start patient on Wellbutrin 100 mg twice a day. Call with any concerns.  - Patient has been identified as being depressed and appropriate orders and " counseling have been given  - buPROPion (WELLBUTRIN) 100 MG Tab; Take 1 Tab by mouth 2 times a day.  Dispense: 60 Tab; Refill: 5      Followup: Return if symptoms worsen or fail to improve.

## 2018-03-21 ENCOUNTER — PATIENT MESSAGE (OUTPATIENT)
Dept: MEDICAL GROUP | Facility: MEDICAL CENTER | Age: 43
End: 2018-03-21

## 2018-03-21 RX ORDER — SUMATRIPTAN AND NAPROXEN SODIUM 85; 500 MG/1; MG/1
1 TABLET, FILM COATED ORAL
Qty: 5 TAB | Refills: 1 | Status: SHIPPED | OUTPATIENT
Start: 2018-03-21 | End: 2018-05-11 | Stop reason: SDUPTHER

## 2018-03-21 NOTE — TELEPHONE ENCOUNTER
From: Marcela Tiwari  To: Ivan Armstrong M.D.  Sent: 3/21/2018 8:33 AM PDT  Subject: Prescription Question    Dr. Armstrong,     I was wondering if you can prescribe me Treximet, I used to take it for migraines but it was very expensive $10 per a pill so one of my doctor recommended Sumatriptan. I just need to have something handy that works for sure and I afraid if I will go to  another Sumatriptan it will be another pink and triangle pills that didnt work for me yesterday even after taking 2 of them. Sincerely, Marcela

## 2018-03-28 ENCOUNTER — PATIENT MESSAGE (OUTPATIENT)
Dept: MEDICAL GROUP | Facility: MEDICAL CENTER | Age: 43
End: 2018-03-28

## 2018-03-28 NOTE — TELEPHONE ENCOUNTER
From: Marcela Tiwari  To: Ivan Armstrong M.D.  Sent: 3/28/2018 9:21 AM PDT  Subject: Non-Urgent Medical Question    Dr. Armstrong,    I would like to let you know that I stopped taking Wellbutrin because I am afraid it was a source of my migraines. I havent had any migraines since I stopped taking it.   I guess at this point I will just try to be without antidepressants. I think I can do it. Just need more sleep.    Thank you.  Sincerely, Marcela

## 2018-03-30 ENCOUNTER — OFFICE VISIT (OUTPATIENT)
Dept: URGENT CARE | Facility: CLINIC | Age: 43
End: 2018-03-30
Payer: COMMERCIAL

## 2018-03-30 VITALS
TEMPERATURE: 97.9 F | SYSTOLIC BLOOD PRESSURE: 100 MMHG | OXYGEN SATURATION: 95 % | RESPIRATION RATE: 16 BRPM | HEART RATE: 84 BPM | WEIGHT: 179.6 LBS | HEIGHT: 71 IN | DIASTOLIC BLOOD PRESSURE: 62 MMHG | BODY MASS INDEX: 25.15 KG/M2

## 2018-03-30 DIAGNOSIS — J32.9 RHINOSINUSITIS: ICD-10-CM

## 2018-03-30 DIAGNOSIS — J98.8 RTI (RESPIRATORY TRACT INFECTION): ICD-10-CM

## 2018-03-30 PROCEDURE — 99214 OFFICE O/P EST MOD 30 MIN: CPT | Performed by: FAMILY MEDICINE

## 2018-03-30 RX ORDER — DEXAMETHASONE SODIUM PHOSPHATE 4 MG/ML
8 INJECTION, SOLUTION INTRA-ARTICULAR; INTRALESIONAL; INTRAMUSCULAR; INTRAVENOUS; SOFT TISSUE ONCE
Status: COMPLETED | OUTPATIENT
Start: 2018-03-30 | End: 2018-03-30

## 2018-03-30 RX ORDER — PREDNISONE 10 MG/1
30 TABLET ORAL EVERY MORNING
Qty: 21 TAB | Refills: 0 | Status: SHIPPED | OUTPATIENT
Start: 2018-03-30 | End: 2019-09-04 | Stop reason: SDUPTHER

## 2018-03-30 RX ORDER — FLUTICASONE PROPIONATE 50 MCG
2 SPRAY, SUSPENSION (ML) NASAL
Qty: 1 BOTTLE | Refills: 1 | Status: SHIPPED | OUTPATIENT
Start: 2018-03-30 | End: 2018-09-01

## 2018-03-30 RX ORDER — AMOXICILLIN AND CLAVULANATE POTASSIUM 875; 125 MG/1; MG/1
1 TABLET, FILM COATED ORAL 2 TIMES DAILY
Qty: 14 TAB | Refills: 0 | Status: SHIPPED | OUTPATIENT
Start: 2018-03-30 | End: 2018-04-06

## 2018-03-30 RX ADMIN — DEXAMETHASONE SODIUM PHOSPHATE 8 MG: 4 INJECTION, SOLUTION INTRA-ARTICULAR; INTRALESIONAL; INTRAMUSCULAR; INTRAVENOUS; SOFT TISSUE at 09:45

## 2018-03-30 ASSESSMENT — ENCOUNTER SYMPTOMS
SINUS PAIN: 1
FOCAL WEAKNESS: 0
SPUTUM PRODUCTION: 0
CHILLS: 0
COUGH: 1
FEVER: 0
DIZZINESS: 0

## 2018-03-30 NOTE — PROGRESS NOTES
"Subjective:      Marcela Tiwari is a 42 y.o. female who presents with Sinus Problem (congestion, headache, post nasal drip, fatigue )    Chief Complaint   Patient presents with   • Sinus Problem     congestion, headache, post nasal drip, fatigue         - This is a very pleasant 42 y.o. 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   • Headache, classical migraine         MEDS:    Current Outpatient Prescriptions:   •  fluticasone (FLONASE) 50 MCG/ACT nasal spray, Spray 2 Sprays in nose every bedtime., Disp: 1 Bottle, Rfl: 1  •  amoxicillin-clavulanate (AUGMENTIN) 875-125 MG Tab, Take 1 Tab by mouth 2 times a day for 7 days., Disp: 14 Tab, Rfl: 0  •  predniSONE (DELTASONE) 10 MG Tab, Take 3 Tabs by mouth every morning for 7 days., Disp: 21 Tab, Rfl: 0  •  Sumatriptan-Naproxen Sodium (TREXIMET)  MG Tab, Take 1 Tab by mouth 1 time daily as needed (Migraine)., Disp: 5 Tab, Rfl: 1    Current Facility-Administered Medications:   •  dexamethasone (DECADRON) injection 8 mg, 8 mg, Intramuscular, Once, Madhu López M.D.    ** I have documented what I find to be significant in regards to past medical, social, family and surgical history  in my HPI or under PMH/PSH/FH review section, otherwise it is contributory **           HPI    Review of Systems   Constitutional: Negative for chills and fever.   HENT: Positive for congestion and sinus pain.    Respiratory: Positive for cough. Negative for sputum production.    Neurological: Negative for dizziness and focal weakness.          Objective:     /62   Pulse 84   Temp 36.6 °C (97.9 °F)   Resp 16   Ht 1.803 m (5' 11\")   Wt 81.5 kg (179 lb 9.6 oz)   LMP 02/14/2018   SpO2 95%   Breastfeeding? No   BMI 25.05 kg/m²      Physical Exam   Constitutional: She appears well-developed. No distress.   HENT:   Head: " Normocephalic and atraumatic.   Mouth/Throat: Oropharynx is clear and moist.   Eyes: Conjunctivae are normal.   Neck: Neck supple.   Cardiovascular: Regular rhythm.    No murmur heard.  Pulmonary/Chest: Effort normal and breath sounds normal. No respiratory distress.   Neurological: She is alert. She exhibits normal muscle tone.   Skin: Skin is warm and dry.   Psychiatric: She has a normal mood and affect. Judgment normal.   Nursing note and vitals reviewed.  boggy nasal mucosa w/ yellow DC            Assessment/Plan:         1. RTI (respiratory tract infection)     2. Rhinosinusitis  dexamethasone (DECADRON) injection 8 mg    fluticasone (FLONASE) 50 MCG/ACT nasal spray    amoxicillin-clavulanate (AUGMENTIN) 875-125 MG Tab    predniSONE (DELTASONE) 10 MG Tab             Dx & d/c instructions discussed w/ patient and/or family members. Follow up w/ Prvt Dr or here in 3-4 days if not getting better, sooner if needed,  ER if worse and UC/PCP unavailable.        Possible side effects (i.e. Rash, GI upset/constipation, sedation, elevation of BP or sugars) of any medications given discussed.

## 2018-05-11 ENCOUNTER — PATIENT MESSAGE (OUTPATIENT)
Dept: MEDICAL GROUP | Facility: MEDICAL CENTER | Age: 43
End: 2018-05-11

## 2018-05-11 DIAGNOSIS — G43.009 MIGRAINE WITHOUT AURA AND WITHOUT STATUS MIGRAINOSUS, NOT INTRACTABLE: ICD-10-CM

## 2018-05-11 RX ORDER — SUMATRIPTAN 50 MG/1
50 TABLET, FILM COATED ORAL
Qty: 10 TAB | Refills: 3 | Status: SHIPPED | OUTPATIENT
Start: 2018-05-11 | End: 2019-03-15 | Stop reason: SDUPTHER

## 2018-05-11 RX ORDER — SUMATRIPTAN AND NAPROXEN SODIUM 85; 500 MG/1; MG/1
1 TABLET, FILM COATED ORAL
Qty: 5 TAB | Refills: 1 | Status: SHIPPED | OUTPATIENT
Start: 2018-05-11 | End: 2018-05-14 | Stop reason: CLARIF

## 2018-05-11 NOTE — TELEPHONE ENCOUNTER
From: Marcela Tiwari  To: Ivan Armstrong M.D.  Sent: 5/11/2018 1:10 PM PDT  Subject: Prescription Question    Dr. Armstrong,    I am sorry to bother you in such a short notice. Can you please verify fax from [facility] for updating my refill for the Sumatriptan 50 mg . We are taking off to [place] tomorrow morning and I need to make sure I have these pills with me just in case. Thank you. Marcela

## 2018-05-11 NOTE — TELEPHONE ENCOUNTER
From: Marcela Tiwari  To: LONNIE Jameson  Sent: 5/11/2018 4:25 PM PDT  Subject: Prescription Question    Codi, thank you for sending it back. Unfortunately, this is not the prescription they faxed for. You sent for sumatriptan with something else and my prescription is just for sumatriptan 50 mg. I am waiting at the pharmacy to get emergency refill fir 2 pills . Next week I hope to refill a full prescription. Thank you. Marcela   ----- Message -----  From: LONNIE Jameson  Sent: 5/11/2018 3:24 PM PDT  To: Marcela Tiwari  Subject: RE: Prescription Question  Marcela,  I have sent your prescription for Dr. Armstrong who is out of the office today.  Valarie HELLER      ----- Message -----   From: Marcela Tiwari   Sent: 5/11/2018 1:10 PM PDT   To: Ivan Armstrong M.D.  Subject: Prescription Question    Dr. Armstrong,    I am sorry to bother you in such a short notice. Can you please verify fax from [facility] for updating my refill for the Sumatriptan 50 mg . We are taking off to [place] tomorrow morning and I need to make sure I have these pills with me just in case. Thank you. Marcela

## 2018-05-14 RX ORDER — SUMATRIPTAN 50 MG/1
TABLET, FILM COATED ORAL
Qty: 9 TAB | Refills: 0 | OUTPATIENT
Start: 2018-05-14

## 2018-05-21 ENCOUNTER — PATIENT MESSAGE (OUTPATIENT)
Dept: MEDICAL GROUP | Facility: MEDICAL CENTER | Age: 43
End: 2018-05-21

## 2018-05-21 NOTE — TELEPHONE ENCOUNTER
"From: Marcela Tiwari  To: Ivan Armstrong M.D.  Sent: 2018 11:33 AM PDT  Subject: Procedure Question    Dr. Armstrong,     My disability place card from [place] \"Permanent\" blue color number [id number] (B1)  on 2017. I believe I had it for 5 years but I cannot remember fro sure. Finally , I am on a summer break and have time to renew it. I was wondering if you can please complete the bottom of the form that I attached, scanned it and send me back. Or I can pick it up from your office at the reception if it will be more convenient for you.     Unfortunately , it didnt allow me to attach it with my completed info on the top. This is the link  [email or web address]     for the [place] form.     Please, let me know if you have any questions. thank you. Sincerely, Marcela"

## 2018-05-21 NOTE — TELEPHONE ENCOUNTER
"From: Marcela Tiwari  To: Sophie Sanchez, Med Ass't  Sent: 2018 1:14 PM PDT  Subject: Procedure Question    Yes. That will be great. Thank you. Let me know when I can stop by . Marcela   ----- Message -----  From: Sophie Sanchez, Med Ass't  Sent: 2018 12:49 PM PDT  To: Marcela Tiwari  Subject: RE: Procedure Question  Pedro Medrano,    I can leave a DMV Placard application in the front for you to  if you would like. Please let me know, thank you.    Sophie OZUNA    ----- Message -----   From: Marcela Tiwari   Sent: 2018 11:33 AM PDT   To: Ivan Armstrong M.D.  Subject: Procedure Question    Dr. Armstrong,     My disability place card from [place] \"Permanent\" blue color number [id number] (B1)  on 2017. I believe I had it for 5 years but I cannot remember fro sure. Finally , I am on a summer break and have time to renew it. I was wondering if you can please complete the bottom of the form that I attached, scanned it and send me back. Or I can pick it up from your office at the reception if it will be more convenient for you.     Unfortunately , it didnt allow me to attach it with my completed info on the top. This is the link  [email or web address]     for the [place] form.     Please, let me know if you have any questions. thank you. Sincerely, Marcela"

## 2018-05-24 ENCOUNTER — HOSPITAL ENCOUNTER (OUTPATIENT)
Dept: RADIOLOGY | Facility: MEDICAL CENTER | Age: 43
End: 2018-05-24
Attending: FAMILY MEDICINE
Payer: COMMERCIAL

## 2018-05-24 DIAGNOSIS — Z12.39 BREAST SCREENING: ICD-10-CM

## 2018-05-24 PROCEDURE — 77067 SCR MAMMO BI INCL CAD: CPT

## 2018-06-01 ENCOUNTER — HOSPITAL ENCOUNTER (OUTPATIENT)
Dept: RADIOLOGY | Facility: MEDICAL CENTER | Age: 43
End: 2018-06-01
Attending: FAMILY MEDICINE
Payer: COMMERCIAL

## 2018-06-01 DIAGNOSIS — R92.8 ABNORMAL MAMMOGRAM: ICD-10-CM

## 2018-06-01 PROCEDURE — 77065 DX MAMMO INCL CAD UNI: CPT | Mod: RT

## 2018-06-04 ENCOUNTER — OFFICE VISIT (OUTPATIENT)
Dept: MEDICAL GROUP | Facility: MEDICAL CENTER | Age: 43
End: 2018-06-04
Payer: COMMERCIAL

## 2018-06-04 VITALS
OXYGEN SATURATION: 95 % | SYSTOLIC BLOOD PRESSURE: 98 MMHG | TEMPERATURE: 97.6 F | HEART RATE: 71 BPM | DIASTOLIC BLOOD PRESSURE: 62 MMHG | WEIGHT: 185 LBS | RESPIRATION RATE: 16 BRPM | BODY MASS INDEX: 25.9 KG/M2 | HEIGHT: 71 IN

## 2018-06-04 DIAGNOSIS — D64.9 NORMOCHROMIC ANEMIA: ICD-10-CM

## 2018-06-04 DIAGNOSIS — Z89.519 S/P BKA (BELOW KNEE AMPUTATION) UNILATERAL (HCC): ICD-10-CM

## 2018-06-04 DIAGNOSIS — J31.0 CHRONIC RHINITIS: ICD-10-CM

## 2018-06-04 DIAGNOSIS — Z85.830 HX OF EWING'S SARCOMA: ICD-10-CM

## 2018-06-04 DIAGNOSIS — R51.9 NONINTRACTABLE HEADACHE, UNSPECIFIED CHRONICITY PATTERN, UNSPECIFIED HEADACHE TYPE: ICD-10-CM

## 2018-06-04 DIAGNOSIS — G43.009 MIGRAINE WITHOUT AURA AND WITHOUT STATUS MIGRAINOSUS, NOT INTRACTABLE: ICD-10-CM

## 2018-06-04 DIAGNOSIS — Z00.00 ANNUAL PHYSICAL EXAM: ICD-10-CM

## 2018-06-04 PROBLEM — R10.31 RIGHT GROIN PAIN: Status: RESOLVED | Noted: 2017-04-12 | Resolved: 2018-06-04

## 2018-06-04 PROBLEM — F32.9 MAJOR DEPRESSIVE DISORDER: Status: RESOLVED | Noted: 2018-03-07 | Resolved: 2018-06-04

## 2018-06-04 PROCEDURE — 99396 PREV VISIT EST AGE 40-64: CPT | Performed by: FAMILY MEDICINE

## 2018-06-05 NOTE — ASSESSMENT & PLAN NOTE
Having more frequent headaches and they are severe. She is also having worsening vertigo.  Patient is concerned about the changes and would like to proceed with MRI of brain.

## 2018-06-05 NOTE — PROGRESS NOTES
"Subjective:   Marcela Tiwari is a 42 y.o. female here today for annual    Headache  Having more frequent headaches and they are severe. She is also having worsening vertigo.  Patient is concerned about the changes and would like to proceed with MRI of brain.       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 She is doing well with her prosthetic but will require new equipment soon, such as socks.      Current medicines (including changes today)  Current Outpatient Prescriptions   Medication Sig Dispense Refill   • SUMAtriptan (IMITREX) 50 MG Tab Take 1 Tab by mouth Once PRN for Migraine for up to 1 dose. 10 Tab 3   • fluticasone (FLONASE) 50 MCG/ACT nasal spray Spray 2 Sprays in nose every bedtime. 1 Bottle 1     No current facility-administered medications for this visit.      She  has a past medical history of Cancer (HCC) (2006) and Headache, classical migraine.    ROS   No chest pain, no shortness of breath, no abdominal pain       Objective:     Blood pressure (!) 98/62, pulse 71, temperature 36.4 °C (97.6 °F), resp. rate 16, height 1.803 m (5' 11\"), weight 83.9 kg (185 lb), last menstrual period 02/14/2018, SpO2 95 %. Body mass index is 25.8 kg/m².   Physical Exam:  Constitutional: Alert, no distress.  Skin: Warm, dry, good turgor, no rashes in visible areas.  Eye: Equal, round and reactive, conjunctiva clear, lids normal.  ENMT: Lips without lesions, good dentition, oropharynx clear.  Neck: Trachea midline, no masses, no thyromegaly. No cervical or supraclavicular lymphadenopathy  Respiratory: Unlabored respiratory effort, lungs clear to auscultation, no wheezes, no ronchi.  Cardiovascular: Normal S1, S2, no murmur, no edema.  Abdomen: Soft, non-tender, no masses, no hepatosplenomegaly.  Psych: Alert and oriented x3, normal affect and mood.        Assessment and Plan:   The following treatment plan was discussed    1. Annual physical exam  Check labs and my chart with " results.  - CBC WITH DIFFERENTIAL; Future  - COMP METABOLIC PANEL; Future  - HEMOGLOBIN A1C; Future  - LIPID PROFILE; Future  - TSH WITH REFLEX TO FT4; Future  - VITAMIN D,25 HYDROXY; Future    2. S/P BKA (below knee amputation) unilateral (HCC)  Stable. Continue prosthesis.    3. Nonintractable headache, unspecified chronicity pattern, unspecified headache type  Uncontrolled. Check MRI and mychart with results.  - MR-BRAIN-W/O; Future    4. Hx of Ludwig's sarcoma  Stable.    5. Normochromic anemia  Check labs and message with results.    6. Migraine without aura and without status migrainosus, not intractable  Check MRI for worsening headaches.    7. Chronic rhinitis  Stable. Continue flonase.      Followup: Return in about 1 year (around 6/4/2019) for Annual.

## 2018-06-11 ENCOUNTER — HOSPITAL ENCOUNTER (OUTPATIENT)
Dept: RADIOLOGY | Facility: MEDICAL CENTER | Age: 43
End: 2018-06-11
Attending: FAMILY MEDICINE
Payer: COMMERCIAL

## 2018-06-11 DIAGNOSIS — R51.9 NONINTRACTABLE HEADACHE, UNSPECIFIED CHRONICITY PATTERN, UNSPECIFIED HEADACHE TYPE: ICD-10-CM

## 2018-06-11 PROCEDURE — 70551 MRI BRAIN STEM W/O DYE: CPT

## 2018-06-12 ENCOUNTER — PATIENT MESSAGE (OUTPATIENT)
Dept: MEDICAL GROUP | Facility: MEDICAL CENTER | Age: 43
End: 2018-06-12

## 2018-06-12 ENCOUNTER — OFFICE VISIT (OUTPATIENT)
Dept: URGENT CARE | Facility: CLINIC | Age: 43
End: 2018-06-12
Payer: COMMERCIAL

## 2018-06-12 VITALS
BODY MASS INDEX: 26.32 KG/M2 | SYSTOLIC BLOOD PRESSURE: 102 MMHG | WEIGHT: 188 LBS | OXYGEN SATURATION: 95 % | DIASTOLIC BLOOD PRESSURE: 68 MMHG | HEART RATE: 83 BPM | TEMPERATURE: 97.7 F | HEIGHT: 71 IN | RESPIRATION RATE: 12 BRPM

## 2018-06-12 DIAGNOSIS — J31.0 CHRONIC RHINITIS: ICD-10-CM

## 2018-06-12 DIAGNOSIS — J01.40 SUBACUTE PANSINUSITIS: ICD-10-CM

## 2018-06-12 DIAGNOSIS — J32.4 CHRONIC PANSINUSITIS: ICD-10-CM

## 2018-06-12 PROCEDURE — 99213 OFFICE O/P EST LOW 20 MIN: CPT | Performed by: EMERGENCY MEDICINE

## 2018-06-12 RX ORDER — AMOXICILLIN AND CLAVULANATE POTASSIUM 875; 125 MG/1; MG/1
1 TABLET, FILM COATED ORAL 2 TIMES DAILY
Qty: 14 TAB | Refills: 0 | Status: SHIPPED | OUTPATIENT
Start: 2018-06-12 | End: 2018-11-16 | Stop reason: SDUPTHER

## 2018-06-12 RX ORDER — FLUTICASONE PROPIONATE 50 MCG
2 SPRAY, SUSPENSION (ML) NASAL DAILY
Status: DISCONTINUED | OUTPATIENT
Start: 2018-06-12 | End: 2018-06-15

## 2018-06-15 RX ORDER — FLUTICASONE PROPIONATE 50 MCG
2 SPRAY, SUSPENSION (ML) NASAL DAILY
Status: DISCONTINUED | OUTPATIENT
Start: 2018-06-15 | End: 2019-07-02

## 2018-06-15 ASSESSMENT — ENCOUNTER SYMPTOMS
MYALGIAS: 0
NAUSEA: 0
STRIDOR: 0
EYE REDNESS: 0
EYE DISCHARGE: 0
VOMITING: 0
SORE THROAT: 0
COUGH: 0
HEMOPTYSIS: 0
HEADACHES: 1
ABDOMINAL PAIN: 0
CHILLS: 0
FEVER: 0
NECK PAIN: 0
SINUS PAIN: 1

## 2018-06-15 NOTE — PROGRESS NOTES
"Subjective:      Marcela Tiwari is a 42 y.o. female who presents with Sinus Problem (sinus pressure)            HPI  Patient is a 42-year-old female with a long history of sinusitis has been using Afrin nasal spray aggressively for the past 5 days and I recommended against continuing. She states that it gets to the point where she can't breathe and she is forced to use it. She is well aware of reflex congestion that occurs.    She states she's been with both diffuse sinusitis as well as \"migraine headaches\". Recently had a MRI of her head which incidentally showed pansinusitis    Allergies   Allergen Reactions   • Seasonal Runny Nose     Seasonal allergies   /  Social History     Social History   • Marital status: Single     Spouse name: N/A   • Number of children: N/A   • Years of education: N/A     Occupational History   • Not on file.     Social History Main Topics   • Smoking status: Never Smoker   • Smokeless tobacco: Never Used   • Alcohol use 2.0 oz/week     4 Glasses of wine per week   • Drug use: No   • Sexual activity: Yes     Partners: Male     Other Topics Concern   • Not on file     Social History Narrative   • No narrative on file     Past Medical History:   Diagnosis Date   • Cancer ([facility]) 2006    ramos's sarcoma.   • Headache, classical migraine      Current Outpatient Prescriptions on File Prior to Visit   Medication Sig Dispense Refill   • SUMAtriptan (IMITREX) 50 MG Tab Take 1 Tab by mouth Once PRN for Migraine for up to 1 dose. 10 Tab 3   • fluticasone (FLONASE) 50 MCG/ACT nasal spray Spray 2 Sprays in nose every bedtime. 1 Bottle 1     No current facility-administered medications on file prior to visit.    Afrin nasal spray.    Family History   Problem Relation Age of Onset   • Other Mother 48     Parkinson's   • Cancer Paternal Grandmother      breast cancer     Review of Systems   Constitutional: Negative for chills and fever.   HENT: Positive for congestion and sinus pain. Negative " "for sore throat.    Eyes: Negative for discharge and redness.   Respiratory: Negative for cough, hemoptysis and stridor.    Cardiovascular: Negative for chest pain.   Gastrointestinal: Negative for abdominal pain, nausea and vomiting.   Musculoskeletal: Negative for myalgias and neck pain.   Skin: Negative for rash.   Neurological: Positive for headaches.          Objective:     /68   Pulse 83   Temp 36.5 °C (97.7 °F)   Resp 12   Ht 1.803 m (5' 11\")   Wt 85.3 kg (188 lb)   LMP 02/14/2018   SpO2 95%   BMI 26.22 kg/m²      Physical Exam   Constitutional: She appears well-developed and well-nourished. No distress.   HENT:   Head: Normocephalic and atraumatic.   Right Ear: External ear normal.   Left Ear: External ear normal.   Patient has diffuse facial pressure secondary to frontal maxillary sinusitis. MRI shows pansinusitis.   Eyes: Conjunctivae are normal. Right eye exhibits no discharge. Left eye exhibits no discharge.   Neck: Normal range of motion.   Cardiovascular: Normal rate.    Pulmonary/Chest: Effort normal. No respiratory distress.   Musculoskeletal: Normal range of motion.   Skin: Skin is warm. No rash noted. She is not diaphoretic. No erythema.   Psychiatric: She has a normal mood and affect. Her behavior is normal.   Nursing note and vitals reviewed.              Assessment/Plan:     1. Subacute pansinusitis            I am recommending the patient initiate/ continue hydration efforts including the use of a vaporizer/humidifier/ netti pot. I also recommend the pt, initiate Mucinex and Sudafed or Dayquil if not hypertensive. I recommended against the use of Afrin she is at the maximum use currently. In addition the patient will initiate the prescribed prescription medication/s: Patient will use her Flonase as well as Augmentin. I've given her referral to ear nose and throat. Also recommended she take a humidifier with her on her trip across [place].. If the patient's condition exacerbates " with worsening dysphagia, shortness of breath, uncontrolled fever, headache or chest pressure he/she will return immediately to the urgent care or go to  the emergency department for further evaluation.    Shane Richter      - amoxicillin-clavulanate (AUGMENTIN) 875-125 MG Tab; Take 1 Tab by mouth 2 times a day for 10 days.  Dispense: 14 Tab; Refill: 0  - fluticasone (FLONASE) nasal spray 100 mcg; Spray 2 Sprays in nose every day.

## 2018-06-28 ENCOUNTER — TELEPHONE (OUTPATIENT)
Dept: MEDICAL GROUP | Facility: MEDICAL CENTER | Age: 43
End: 2018-06-28

## 2018-06-28 NOTE — TELEPHONE ENCOUNTER
[facility] called on behalf of patient. Stated that you signed Rx for prosthetic socks. However, in addition to that, insurance needs patient notes before they will pay for the socks. Need notes by today, insurance expires on 06/30/2018.

## 2018-07-06 ENCOUNTER — HOSPITAL ENCOUNTER (OUTPATIENT)
Dept: LAB | Facility: MEDICAL CENTER | Age: 43
End: 2018-07-06
Attending: FAMILY MEDICINE
Payer: COMMERCIAL

## 2018-07-06 DIAGNOSIS — Z00.00 ANNUAL PHYSICAL EXAM: ICD-10-CM

## 2018-07-06 LAB
25(OH)D3 SERPL-MCNC: 32 NG/ML (ref 30–100)
ALBUMIN SERPL BCP-MCNC: 4.5 G/DL (ref 3.2–4.9)
ALBUMIN/GLOB SERPL: 1.5 G/DL
ALP SERPL-CCNC: 55 U/L (ref 30–99)
ALT SERPL-CCNC: 15 U/L (ref 2–50)
ANION GAP SERPL CALC-SCNC: 6 MMOL/L (ref 0–11.9)
AST SERPL-CCNC: 13 U/L (ref 12–45)
BASOPHILS # BLD AUTO: 0.9 % (ref 0–1.8)
BASOPHILS # BLD: 0.06 K/UL (ref 0–0.12)
BILIRUB SERPL-MCNC: 0.4 MG/DL (ref 0.1–1.5)
BUN SERPL-MCNC: 15 MG/DL (ref 8–22)
CALCIUM SERPL-MCNC: 9.5 MG/DL (ref 8.5–10.5)
CHLORIDE SERPL-SCNC: 101 MMOL/L (ref 96–112)
CHOLEST SERPL-MCNC: 165 MG/DL (ref 100–199)
CO2 SERPL-SCNC: 31 MMOL/L (ref 20–33)
CREAT SERPL-MCNC: 0.78 MG/DL (ref 0.5–1.4)
EOSINOPHIL # BLD AUTO: 0.2 K/UL (ref 0–0.51)
EOSINOPHIL NFR BLD: 3 % (ref 0–6.9)
ERYTHROCYTE [DISTWIDTH] IN BLOOD BY AUTOMATED COUNT: 43.2 FL (ref 35.9–50)
GLOBULIN SER CALC-MCNC: 3.1 G/DL (ref 1.9–3.5)
GLUCOSE SERPL-MCNC: 94 MG/DL (ref 65–99)
HCT VFR BLD AUTO: 41.7 % (ref 37–47)
HDLC SERPL-MCNC: 75 MG/DL
HGB BLD-MCNC: 13.8 G/DL (ref 12–16)
IMM GRANULOCYTES # BLD AUTO: 0.02 K/UL (ref 0–0.11)
IMM GRANULOCYTES NFR BLD AUTO: 0.3 % (ref 0–0.9)
LDLC SERPL CALC-MCNC: 72 MG/DL
LYMPHOCYTES # BLD AUTO: 1.8 K/UL (ref 1–4.8)
LYMPHOCYTES NFR BLD: 27.4 % (ref 22–41)
MCH RBC QN AUTO: 31.2 PG (ref 27–33)
MCHC RBC AUTO-ENTMCNC: 33.1 G/DL (ref 33.6–35)
MCV RBC AUTO: 94.3 FL (ref 81.4–97.8)
MONOCYTES # BLD AUTO: 0.42 K/UL (ref 0–0.85)
MONOCYTES NFR BLD AUTO: 6.4 % (ref 0–13.4)
NEUTROPHILS # BLD AUTO: 4.08 K/UL (ref 2–7.15)
NEUTROPHILS NFR BLD: 62 % (ref 44–72)
NRBC # BLD AUTO: 0 K/UL
NRBC BLD-RTO: 0 /100 WBC
PLATELET # BLD AUTO: 219 K/UL (ref 164–446)
PMV BLD AUTO: 10.4 FL (ref 9–12.9)
POTASSIUM SERPL-SCNC: 4 MMOL/L (ref 3.6–5.5)
PROT SERPL-MCNC: 7.6 G/DL (ref 6–8.2)
RBC # BLD AUTO: 4.42 M/UL (ref 4.2–5.4)
SODIUM SERPL-SCNC: 138 MMOL/L (ref 135–145)
TRIGL SERPL-MCNC: 90 MG/DL (ref 0–149)
TSH SERPL DL<=0.005 MIU/L-ACNC: 0.92 UIU/ML (ref 0.38–5.33)
WBC # BLD AUTO: 6.6 K/UL (ref 4.8–10.8)

## 2018-07-06 PROCEDURE — 82306 VITAMIN D 25 HYDROXY: CPT

## 2018-07-06 PROCEDURE — 80061 LIPID PANEL: CPT

## 2018-07-06 PROCEDURE — 83036 HEMOGLOBIN GLYCOSYLATED A1C: CPT

## 2018-07-06 PROCEDURE — 85025 COMPLETE CBC W/AUTO DIFF WBC: CPT

## 2018-07-06 PROCEDURE — 36415 COLL VENOUS BLD VENIPUNCTURE: CPT

## 2018-07-06 PROCEDURE — 84443 ASSAY THYROID STIM HORMONE: CPT

## 2018-07-06 PROCEDURE — 80053 COMPREHEN METABOLIC PANEL: CPT

## 2018-07-07 LAB
EST. AVERAGE GLUCOSE BLD GHB EST-MCNC: 111 MG/DL
HBA1C MFR BLD: 5.5 % (ref 0–5.6)

## 2018-07-09 ENCOUNTER — PATIENT MESSAGE (OUTPATIENT)
Dept: MEDICAL GROUP | Facility: MEDICAL CENTER | Age: 43
End: 2018-07-09

## 2018-07-09 DIAGNOSIS — J31.0 CHRONIC RHINITIS: ICD-10-CM

## 2018-07-09 NOTE — TELEPHONE ENCOUNTER
From: Marcela Tiwari  To: Ivan Armstrong M.D.  Sent: 7/9/2018 11:49 AM PDT  Subject: Non-Urgent Medical Question    Thank you Dr. Armstrong. I appreciate it.   Please, let me know ENT doctor you referred me because I havent heard from them either.  I am just try to get on top of this pansinusitis not make sure it will not knock me down as last 2 times.     Thank you. Marcela   ----- Message -----  From: Ivan Armstrong M.D.  Sent: 7/9/2018 11:28 AM PDT  To: Marcela Tiwari  Subject: RE: Non-Urgent Medical Question  Marcela,    I have placed a referral to [facility] [place]. You should receive a [employer] message in the next few days with the referral information.    Dr. Armstrong    ----- Message -----   From: Marcela Tiwari   Sent: 7/9/2018 11:18 AM PDT   To: Ivan Armstrong M.D.  Subject: Non-Urgent Medical Question    Dr. Armstrong, Thank you for your email. I am very glad to hear that everything looks great on my blood tests.    I was wondering if there is another allergy clinic in [place] that you can refer me to. Looks like  [facility] is so backed up with referrals that I still havent had a chance to schedule even appointment. Please, let me know. Thank you. Sincerely, Marcela

## 2018-09-01 ENCOUNTER — HOSPITAL ENCOUNTER (EMERGENCY)
Facility: MEDICAL CENTER | Age: 43
End: 2018-09-01
Attending: EMERGENCY MEDICINE
Payer: COMMERCIAL

## 2018-09-01 VITALS
DIASTOLIC BLOOD PRESSURE: 70 MMHG | RESPIRATION RATE: 20 BRPM | SYSTOLIC BLOOD PRESSURE: 136 MMHG | BODY MASS INDEX: 27.14 KG/M2 | HEART RATE: 67 BPM | OXYGEN SATURATION: 85 % | WEIGHT: 189.6 LBS | HEIGHT: 70 IN | TEMPERATURE: 98.7 F

## 2018-09-01 DIAGNOSIS — R51.9 FACIAL PAIN: ICD-10-CM

## 2018-09-01 PROCEDURE — 700102 HCHG RX REV CODE 250 W/ 637 OVERRIDE(OP): Performed by: EMERGENCY MEDICINE

## 2018-09-01 PROCEDURE — A9270 NON-COVERED ITEM OR SERVICE: HCPCS | Performed by: EMERGENCY MEDICINE

## 2018-09-01 PROCEDURE — 99284 EMERGENCY DEPT VISIT MOD MDM: CPT

## 2018-09-01 RX ORDER — CETIRIZINE HYDROCHLORIDE 10 MG/1
10 TABLET ORAL DAILY
Status: SHIPPED | COMMUNITY
End: 2020-10-01

## 2018-09-01 RX ORDER — OXYCODONE HYDROCHLORIDE 5 MG/1
5 TABLET ORAL ONCE
Status: COMPLETED | OUTPATIENT
Start: 2018-09-01 | End: 2018-09-01

## 2018-09-01 RX ORDER — OXYCODONE HYDROCHLORIDE 5 MG/1
5 TABLET ORAL EVERY 6 HOURS PRN
Qty: 2 TAB | Refills: 0 | Status: SHIPPED | OUTPATIENT
Start: 2018-09-01 | End: 2018-09-02

## 2018-09-01 RX ADMIN — OXYCODONE HYDROCHLORIDE 5 MG: 5 TABLET ORAL at 04:51

## 2018-09-01 NOTE — DISCHARGE INSTRUCTIONS
Trigeminal Neuralgia  Trigeminal neuralgia is a nerve disorder that causes attacks of severe facial pain. The attacks last from a few seconds to several minutes. They can happen for days, weeks, or months and then go away for months or years. Trigeminal neuralgia is also called tic douloureux.  What are the causes?  This condition is caused by damage to a nerve in the face that is called the trigeminal nerve. An attack can be triggered by:  · Talking.  · Chewing.  · Putting on makeup.  · Washing your face.  · Shaving your face.  · Brushing your teeth.  · Touching your face.  What increases the risk?  This condition is more likely to develop in:  · Women.  · People who are 50 years of age or older.  What are the signs or symptoms?  The main symptom of this condition is pain in the jaw, lips, eyes, nose, scalp, forehead, and face. The pain may be intense, stabbing, electric, or shock-like.  How is this diagnosed?  This condition is diagnosed with a physical exam. A CT scan or MRI may be done to rule out other conditions that can cause facial pain.  How is this treated?  This condition may be treated with:  · Avoiding the things that trigger your attacks.  · Pain medicine.  · Surgery. This may be done in severe cases if other medical treatment does not provide relief.  Follow these instructions at home:  · Take over-the-counter and prescription medicines only as told by your health care provider.  · If you wish to get pregnant, talk with your health care provider before you start trying to get pregnant.  · Avoid the things that trigger your attacks. It may help to:  ¨ Chew on the unaffected side of your mouth.  ¨ Avoid touching your face.  ¨ Avoid blasts of hot or cold air.  Contact a health care provider if:  · Your pain medicine is not helping.  · You develop new, unexplained symptoms, such as:  ¨ Double vision.  ¨ Facial weakness.  ¨ Changes in hearing or balance.  · You become pregnant.  Get help right away  if:  · Your pain is unbearable, and your pain medicine does not help.  This information is not intended to replace advice given to you by your health care provider. Make sure you discuss any questions you have with your health care provider.  Document Released: 12/15/2001 Document Revised: 08/20/2017 Document Reviewed: 04/11/2016  [employer] Interactive Patient Education © 2017 [employer]  Dental Pain  Introduction  Dental pain may be caused by many things, including:  · Tooth decay (cavities or caries). Cavities cause the nerve of your tooth to be open to air and hot or cold temperatures. This can cause pain or discomfort.  · Abscess or infection. A dental abscess is an area that is full of infected pus from a bacterial infection in the inner part of the tooth (pulp). It usually happens at the end of the tooth’s root.  · Injury.  · An unknown reason (idiopathic).  Your pain may be mild or severe. It may only happen when:  · You are chewing.  · You are exposed to hot or cold temperature.  · You are eating or drinking sugary foods or beverages, such as:  ¨ Soda.  ¨ Candy.  Your pain may also be there all of the time.  Follow these instructions at home:  Watch your dental pain for any changes. Do these things to lessen your discomfort:  · Take medicines only as told by your dentist.  · If your dentist tells you to take an antibiotic medicine, finish all of it even if you start to feel better.  · Keep all follow-up visits as told by your dentist. This is important.  · Do not apply heat to the outside of your face.  · Rinse your mouth or gargle with salt water if told by your dentist. This helps with pain and swelling.  ¨ You can make salt water by adding ¼ tsp of salt to 1 cup of warm water.  · Apply ice to the painful area of your face:  ¨ Put ice in a plastic bag.  ¨ Place a towel between your skin and the bag.  ¨ Leave the ice on for 20 minutes, 2-3 times per day.  · Avoid foods or drinks that cause you pain, such  as:  ¨ Very hot or very cold foods or drinks.  ¨ Sweet or sugary foods or drinks.  Contact a doctor if:  · Your pain is not helped with medicines.  · Your symptoms are worse.  · You have new symptoms.  Get help right away if:  · You cannot open your mouth.  · You are having trouble breathing or swallowing.  · You have a fever.  · Your face, neck, or jaw is puffy (swollen).  This information is not intended to replace advice given to you by your health care provider. Make sure you discuss any questions you have with your health care provider.  Document Released: 06/05/2009 Document Revised: 05/25/2017 Document Reviewed: 12/14/2015  © 2017 [employer]

## 2018-09-01 NOTE — ED NOTES
Discharge paper work and prescription given to pt, pt verbalized understanding all information and education given and narcotic consent signed by pt. Pt ambulated out or ER.

## 2018-09-01 NOTE — ED PROVIDER NOTES
ED Provider Note    CHIEF COMPLAINT  Chief Complaint   Patient presents with   • Mouth Pain     the pt is c/o ongoing nerve pain to l face.  being seen by a dentist.        HPI  Marcela Tiwari is a 42 y.o. female who presents to the ER with left facial pain.  Patient explains that she has been having similar pain since December.  She has been working with her dentist to try to figure out the definitive causation.  There is question about whether it is secondary to a left-sided wisdom tooth that remains.  He does have pain relief with Tylenol and NSAIDs.  She was able to get a hold of her dentist and is seeing them later this afternoon although is concerned about pain control until that time over the next 12 hours.  She has taken 3 g of Tylenol and 800 mg of ibuprofen thus far.  Realizing the high doses of medications taken thus far she was concerned about her persistent moderate to severe pain which she is currently rating is a 9 out of 10.  He does seem to wax and wane.  No notable aggravating or leading factors.  She was able to isolate an upper molar on the left that was sensitive to ice as was instructed by testing by her dentist.  No difficulty breathing or swallowing.  No facial swelling.    REVIEW OF SYSTEMS  See HPI for further details. All other systems are negative.     PAST MEDICAL HISTORY   has a past medical history of Cancer (HCC) (2006) and Headache, classical migraine.    SOCIAL HISTORY  Social History     Social History Main Topics   • Smoking status: Never Smoker   • Smokeless tobacco: Never Used   • Alcohol use 2.0 oz/week     4 Glasses of wine per week   • Drug use: No   • Sexual activity: Yes     Partners: Male       SURGICAL HISTORY   has a past surgical history that includes bunionectomy and amputation, below the knee (2011).    CURRENT MEDICATIONS  Home Medications     Reviewed by Dee Dee Stafford R.N. (Registered Nurse) on 09/01/18 at 3056  Med List Status: Partial   Medication  "Last Dose Status   cetirizine (ZYRTEC) 10 MG Tab  Active   fluticasone (FLONASE) nasal spray 100 mcg  Active   SUMAtriptan (IMITREX) 50 MG Tab  Active                ALLERGIES  Allergies   Allergen Reactions   • Seasonal Runny Nose     Seasonal allergies       PHYSICAL EXAM  VITAL SIGNS: /70   Pulse 100   Temp 36.8 °C (98.3 °F)   Resp 20   Ht 1.778 m (5' 10\")   Wt 86 kg (189 lb 9.5 oz)   LMP 02/14/2018   SpO2 97%   BMI 27.20 kg/m²  @KAROL[[id number]::@  Pulse ox interpretation: I interpret this pulse ox as normal.  Constitutional: Alert in no apparent distress.  HENT: Normocephalic, Atraumatic, Bilateral external ears normal. Nose normal.  Well-appearing dental hygiene.  No appreciable large caries or dental fracture.  No appreciable abscess.  No facial swelling.  Eyes: Pupils are equal and reactive. Conjunctiva normal, non-icteric.   Heart: Regular rate and rythm, no murmurs.    Lungs: Clear to auscultation bilaterally.  Skin: Warm, Dry, No erythema, No rash.   Neurologic: Alert, Grossly non-focal.   Psychiatric: Affect normal, Judgment normal, Mood normal, Appears appropriate and not intoxicated.         In prescribing controlled substances to this patient, I certify that I have obtained and reviewed the medical history of Marcela Tiwari. I have also made a good alfredo effort to obtain applicable records from other providers who have treated the patient and no other records are available at this time.     I have conducted a physical exam and documented it. I have reviewed Ms. Tiwari’s prescription history as maintained by the [place] Prescription Monitoring Program.     I have assessed the patient’s risk for abuse, dependency, and addiction using the validated Opioid Risk Tool available at https://www.mdcalc.com/etpvuq-wscu-grrl-ort-narcotic-abuse.     Given the above, I believe the benefits of controlled substance therapy outweigh the risks. The reasons for prescribing controlled substances " include non-narcotic, oral analgesic alternatives have been inadequate for pain control. Accordingly, I have discussed the risk and benefits, treatment plan, and alternative therapies with the patient.       COURSE & MEDICAL DECISION MAKING  Pertinent Labs & Imaging studies reviewed. (See chart for details)  Patient presented to the emergency department complaining of acute on chronic left facial pain.  Patient believes that this pain is secondary to an ongoing dental process.  I have however had additional bedside conversation about alternative differential which could potentially include a trigeminal neuralgia.  This would most optimally be evaluated with MRI.  She says that she wishes to forego such imaging at this point and will follow up with her primary care physician should her dentist later this afternoon not find any further definitive diagnosis.  I am somewhat skeptical about a possible dental pathology especially given the fact that she has been worked up over the last 8 months for possible dental process which is yet to be identified.  At this point she has been given a single dose of a narcotic as well as a prescription for the same for ongoing home analgesia for today.  She will otherwise have ongoing care by dentist and/or Dr. Armstrong.    The patient will not drink alcohol nor drive with prescribed medications. The patient will return for worsening symptoms and is stable at the time of discharge. The patient verbalizes understanding and will comply.    FINAL IMPRESSION  1. Facial pain               Electronically signed by: Mustapha Jamison, 9/1/2018 4:43 AM

## 2018-09-13 ENCOUNTER — TELEPHONE (OUTPATIENT)
Dept: MEDICAL GROUP | Facility: MEDICAL CENTER | Age: 43
End: 2018-09-13

## 2018-09-18 ENCOUNTER — PATIENT MESSAGE (OUTPATIENT)
Dept: MEDICAL GROUP | Facility: MEDICAL CENTER | Age: 43
End: 2018-09-18

## 2018-09-18 DIAGNOSIS — M54.32 SCIATIC PAIN, LEFT: ICD-10-CM

## 2018-09-18 NOTE — TELEPHONE ENCOUNTER
From: Marcela Tiwari  To: Ivna Armstrong M.D.  Sent: 9/18/2018 6:56 AM PDT  Subject: Non-Urgent Medical Question    Dr. Armstrong, I have been struggling with sciatic pain since last Thursday. I had to cancel all my classes because I couldn't sit, lay down or walk. It was horrible. During the last few days I keep thinking that it will get better but I am still struggling to sit which makes it hard to drive to work and nights are the worse (I got 3-4 hrs of sleep in a last week) . Only left side is effected (which where my amputated leg is). I have been taking 1000 mg of tylenol and 600 mg ibuprofen every 4-6 hours . I am afraid for my kidney and liver to be affected.   I was wondering if you can see me today unfortunately I am done at work only at 5 p.m. or tomorrow I can do morning or after 4 p.m. Thank you. Sincerely, Marcela

## 2018-09-19 ENCOUNTER — OFFICE VISIT (OUTPATIENT)
Dept: PHYSICAL MEDICINE AND REHAB | Facility: MEDICAL CENTER | Age: 43
End: 2018-09-19
Payer: COMMERCIAL

## 2018-09-19 VITALS
WEIGHT: 185.41 LBS | HEART RATE: 87 BPM | SYSTOLIC BLOOD PRESSURE: 112 MMHG | TEMPERATURE: 97.7 F | OXYGEN SATURATION: 95 % | BODY MASS INDEX: 26.54 KG/M2 | HEIGHT: 70 IN | DIASTOLIC BLOOD PRESSURE: 60 MMHG

## 2018-09-19 DIAGNOSIS — M54.16 LUMBAR RADICULITIS: ICD-10-CM

## 2018-09-19 DIAGNOSIS — M62.838 MUSCLE SPASM: ICD-10-CM

## 2018-09-19 DIAGNOSIS — Z89.512 HX OF BKA, LEFT (HCC): ICD-10-CM

## 2018-09-19 DIAGNOSIS — M79.2 NERVE PAIN: ICD-10-CM

## 2018-09-19 DIAGNOSIS — M25.552 LEFT HIP PAIN: ICD-10-CM

## 2018-09-19 DIAGNOSIS — M54.50 LUMBOSACRAL PAIN: ICD-10-CM

## 2018-09-19 DIAGNOSIS — M79.18 MYOFASCIAL PAIN: ICD-10-CM

## 2018-09-19 PROCEDURE — 99203 OFFICE O/P NEW LOW 30 MIN: CPT | Performed by: PHYSICAL MEDICINE & REHABILITATION

## 2018-09-19 RX ORDER — GABAPENTIN 300 MG/1
300 CAPSULE ORAL 3 TIMES DAILY
Qty: 30 CAP | Refills: 1 | Status: SHIPPED | OUTPATIENT
Start: 2018-09-19 | End: 2020-10-01

## 2018-09-19 RX ORDER — METHYLPREDNISOLONE 4 MG/1
TABLET ORAL
Qty: 21 TAB | Refills: 0 | Status: SHIPPED | OUTPATIENT
Start: 2018-09-19 | End: 2018-11-16

## 2018-09-19 RX ORDER — BACLOFEN 10 MG/1
10 TABLET ORAL 3 TIMES DAILY
Qty: 30 TAB | Refills: 1 | Status: SHIPPED | OUTPATIENT
Start: 2018-09-19 | End: 2019-10-29

## 2018-09-19 ASSESSMENT — ENCOUNTER SYMPTOMS
TINGLING: 1
ABDOMINAL PAIN: 0
MYALGIAS: 1
FEVER: 0
ORTHOPNEA: 0
SENSORY CHANGE: 1
CHILLS: 0
HEADACHES: 1
DIARRHEA: 0
SPUTUM PRODUCTION: 0
SHORTNESS OF BREATH: 0
EYE PAIN: 0
CLAUDICATION: 0
EYE DISCHARGE: 0

## 2018-09-19 NOTE — PROGRESS NOTES
Subjective:      Marcela Tiwari is a 42 y.o. female who presents with New Patient    Chief complaint: Low back pain      HPI Ms. Tiwari presents for evaluation of flare of lumbosacral pain beginning approximately 1 week ago. The patient has history of Ludwig sarcoma, treated in 2006, subsequently underwent left below-knee amputation in 2011, she has had residual antalgic gait.    The patient notes flare of lumbosacral pain beginning approximately 1 week ago, possibly associated with a temporary change with her prosthetic.  The patient has had intermittent flares of low back pain prior to this.    She notes the pain is most prominent in the left lumbosacral region, with radiating pain to the left posterior thigh with neuropathic component, worse with activities    The patient also notes left hip area pain.    The patient notes no significant pain associated with a left BK prosthetic, no significant phantom limb pain or sensation now, although she did have the symptoms in the postoperative period    The patient has had prior treatment with medications, including NSAIDs.  She receives massage therapy, tried yoga, also tried exercise program.  No acute changes with bowel/bladder noted.  No acute changes with strength noted.  She is making an effort with home exercise program as tolerated.  The ongoing pain limits her ability to function.  She is inquiring about additional treatment options.      MEDICAL RECORDS REVIEW/DATA REVIEW: Reviewed in epic.    Records Reviewed: Reviewed referring provider notes.     I reviewed medications.  Tried NSAIDs and Tylenol/acetaminophen.  Notes prior use of gabapentin associated with left BKA, tolerated, with benefit.    I reviewed  profile 9/19/2018.    I reviewed diagnostic studies:     I reviewed radiographs.     There is no lumbar spine or hip imaging available for review    Reviewed MRI brain 6/2018, revealed sinus disease. Reviewed pelvic ultrasound 11/2016,  unremarkable. Reviewed CT chest/abdomen/pelvis 6/2016.    I reviewed lab studies.  Reviewed labs 7/2018, including CMP, CBC, A1c 5.5, TFTs per    I reviewed medical issues.     I reviewed family history: No neuromuscular disorders noted.    I reviewed social issues.  , [facility]      PAST MEDICAL HISTORY:   Past Medical History:   Diagnosis Date   • Cancer (HCC) 2006    ramos's sarcoma.   • Headache, classical migraine        PAST SURGICAL HISTORY:    Past Surgical History:   Procedure Laterality Date   • AMPUTATION, BELOW THE KNEE  2011   • BUNIONECTOMY         ALLERGIES:  Seasonal    MEDICATIONS:    Outpatient Encounter Prescriptions as of 9/19/2018   Medication Sig Dispense Refill   • MethylPREDNISolone (MEDROL DOSEPAK) 4 MG Tablet Therapy Pack As directed on the packaging label. 21 Tab 0   • gabapentin (NEURONTIN) 300 MG Cap Take 1 Cap by mouth 3 times a day. as needed for nerve pain 30 Cap 1   • baclofen (LIORESAL) 10 MG Tab Take 1 Tab by mouth 3 times a day. as needed for muscle spasm 30 Tab 1   • cetirizine (ZYRTEC) 10 MG Tab Take 10 mg by mouth every day.     • SUMAtriptan (IMITREX) 50 MG Tab Take 1 Tab by mouth Once PRN for Migraine for up to 1 dose. 10 Tab 3     Facility-Administered Encounter Medications as of 9/19/2018   Medication Dose Route Frequency Provider Last Rate Last Dose   • fluticasone (FLONASE) nasal spray 100 mcg  2 Spray Nasal DAILY Shane Richter M.D.           SOCIAL HISTORY:    Social History     Social History   • Marital status: Single     Spouse name: N/A   • Number of children: N/A   • Years of education: N/A     Social History Main Topics   • Smoking status: Never Smoker   • Smokeless tobacco: Never Used   • Alcohol use 2.0 oz/week     4 Glasses of wine per week   • Drug use: No   • Sexual activity: Yes     Partners: Male     Other Topics Concern   • Not on file     Social History Narrative   • No narrative on file       Review of Systems   Constitutional: Negative for  "chills and fever.   HENT: Negative for ear discharge and ear pain.    Eyes: Negative for pain and discharge.   Respiratory: Negative for sputum production and shortness of breath.    Cardiovascular: Negative for orthopnea and claudication.   Gastrointestinal: Negative for abdominal pain and diarrhea.   Genitourinary: Negative for frequency and urgency.   Musculoskeletal: Positive for joint pain and myalgias.   Skin: Negative.    Neurological: Positive for tingling, sensory change and headaches.         Objective:     /60 (BP Location: Left arm, Patient Position: Sitting, BP Cuff Size: Adult)   Pulse 87   Temp 36.5 °C (97.7 °F) (Temporal)   Ht 1.778 m (5' 10\")   Wt 84.1 kg (185 lb 6.5 oz)   LMP 01/31/2018 (Approximate)   SpO2 95%   Breastfeeding? No   BMI 26.60 kg/m²      Physical Exam    Constitutional: Awake, alert, no acute distress, pain with transition  HEENT: Normocephalic atraumatic, neck supple, no JVD noted, no masses noted, no meningeal signs noted  Lymphadenopathy: no cervical, supraclavicular, or inguinal lymphadenopathy noted  Cardiovascular: Intact distal pulses, including at ankle on right, no limb swelling noted  Pulmonary: No tachypnea noted, no accessory muscle use noted, no dyspnea noted  Abdominal: Soft, nontender, exhibits no distension, no peritoneal signs, no HSM  Musculoskeletal:   Right hip: exhibits no significant tenderness. Minimal pain with range of motion testing  Left hip: exhibits  tenderness.  pain with range of motion testing  Lumbar back: exhibits decreased range of motion,  tenderness and  pain. straight leg testing produces posterior pelvic and thigh pain on the left, trigger points noted left lumbosacral region  Lower limb: Left BK without complication noted  Neurological: oriented to person, place, and time. Cranial nerves grossly intact, normal strength. Sensation intact distally. Reflexes 1+ in  lower limbs, Gait mildly antalgic, reciprocal, no upper motor neuron " signs of  Skin: Skin is intact. no rashes or lesions noted  Psychiatric: normal mood and affect. speech is normal and behavior is normal. Judgment and thought content normal. Cognition and memory are normal.        Assessment/Plan:       ASSESSMENT:    1.  Flare of lumbosacral pain, myofascial pain, lumbar radicular pain, nerve pain, suspect lumbar herniated disc/stenosis    - DX-LUMBAR SPINE-4+ VIEWS; Future  - MR-LUMBAR SPINE-W/O; Future  - Reviewed injection therapy beginning with trigger point injections to treat the myofascial component to the ongoing pain, if not responded to more conservative care, submit authorization request.    - Further goal of diagnostic studies and treatment outlined is to avoid opiate habituation, polypharmacy    2. Left hip pain, sprain strain    - DX-HIP-COMPLETE - UNILATERAL 2+ LEFT; Future    3. Hx of BKA, left (HCC) in 2011, history of Ludwig sarcoma, treated 2006, residual antalgic gait    4.  Comorbid medical issues, with care of her primary care provider      DISCUSSION/PLAN:    - I discussed management options. I reviewed symptomatic care    - I reviewed home exercise program and activity modification    - The patient can consider complementary trials with acupuncture, superficial massage therapy, or TENS unit    - I reviewed medication monitoring.  I reviewed medication adjustments.     - I wrote prescription for:    - MethylPREDNISolone (MEDROL DOSEPAK) 4 MG Tablet Therapy Pack; As directed on the packaging label.  Dispense: 21 Tab; Refill: 0, counseled regarding risks, also to avoid NSAIDs while on oral steroid course  - gabapentin (NEURONTIN) 300 MG Cap; Take 1 Cap by mouth 3 times a day. as needed for nerve pain  Dispense: 30 Cap; Refill: 1  - baclofen (LIORESAL) 10 MG Tab; Take 1 Tab by mouth 3 times a day. as needed for muscle spasm  Dispense: 30 Tab; Refill: 1    - I reviewed risks, side effects, and interactions of medications, including NSAIDs and over-the-counter  medications.  I reviewed further symptomatic medications.    - I reviewed additional diagnostic options, including further/advanced imaging, electrodiagnostic testing, vascular studies, and further lab screen    - I reviewed additional therapeutic options, including further injection/interventional therapy and additional consultative input    - Return after the above-noted diagnostic studies or an as-needed basis      Please note that this dictation was created using voice recognition software. I have made every reasonable attempt to correct obvious errors but there may be errors of grammar and content that I may have overlooked prior to finalization of this note.

## 2018-09-28 ENCOUNTER — HOSPITAL ENCOUNTER (OUTPATIENT)
Dept: RADIOLOGY | Facility: MEDICAL CENTER | Age: 43
End: 2018-09-28
Attending: PHYSICAL MEDICINE & REHABILITATION
Payer: COMMERCIAL

## 2018-09-28 DIAGNOSIS — M54.50 LUMBOSACRAL PAIN: ICD-10-CM

## 2018-09-28 DIAGNOSIS — M54.16 LUMBAR RADICULAR PAIN: ICD-10-CM

## 2018-09-28 DIAGNOSIS — M25.559 ARTHRALGIA OF HIP, UNSPECIFIED LATERALITY: ICD-10-CM

## 2018-09-28 DIAGNOSIS — M25.552 LEFT HIP PAIN: ICD-10-CM

## 2018-09-28 DIAGNOSIS — M54.16 LUMBAR RADICULITIS: ICD-10-CM

## 2018-09-28 DIAGNOSIS — M51.36 DDD (DEGENERATIVE DISC DISEASE), LUMBAR: ICD-10-CM

## 2018-09-28 PROCEDURE — 73502 X-RAY EXAM HIP UNI 2-3 VIEWS: CPT | Mod: LT

## 2018-09-28 PROCEDURE — 72110 X-RAY EXAM L-2 SPINE 4/>VWS: CPT

## 2018-10-08 ENCOUNTER — PATIENT MESSAGE (OUTPATIENT)
Dept: MEDICAL GROUP | Facility: MEDICAL CENTER | Age: 43
End: 2018-10-08

## 2018-10-08 NOTE — TELEPHONE ENCOUNTER
----- Message from Marcela Tiwari sent at 10/8/2018  3:55 PM PDT -----  Regarding: Non-Urgent Medical Question  Contact: [phone]  Dr. Armstrong,    I have been experiencing a chest pain on the left side. It is a dull pain but makes me feel weird and a little dizzy. It is started last night and going though the whole day. It also gives a little lingering into the left arm. I was wondering if you can see me or somebody else can see me at your office today please. I feel like it is better to be checked. Please, let me know. I am work right now but probably can make it by 4.45 p.m. Please, ask somebody to call me [phone] if it is possible.     Sorry to bother you.  Sincerely, Marcela

## 2018-10-08 NOTE — PATIENT COMMUNICATION
Spoke with patient. She will be going to Urgent care since Dr. Latif has no available appointments this week.

## 2018-11-16 ENCOUNTER — PATIENT MESSAGE (OUTPATIENT)
Dept: MEDICAL GROUP | Facility: MEDICAL CENTER | Age: 43
End: 2018-11-16

## 2018-11-16 DIAGNOSIS — J01.40 SUBACUTE PANSINUSITIS: ICD-10-CM

## 2018-11-16 RX ORDER — AMOXICILLIN AND CLAVULANATE POTASSIUM 875; 125 MG/1; MG/1
1 TABLET, FILM COATED ORAL 2 TIMES DAILY
Qty: 14 TAB | Refills: 0 | Status: SHIPPED | OUTPATIENT
Start: 2018-11-16 | End: 2018-11-26

## 2018-11-17 NOTE — TELEPHONE ENCOUNTER
From: Marcela Tiwari  To: Ivan Armstrong M.D.  Sent: 11/16/2018 3:30 PM PST  Subject: Prescription Question    Dear Nathaniel Gamble, I was wondering if you can please send a prescription to the pharmacy for AMOX/K CLAV 875-125 mg tab [employer].     About 3 weeks ago it started as I thought was a cold but I am still struggling with sinus and now it is a build up pressure in my head that causing a migraine for last 48 hours. According the allergy test , I dont have any allergies. So, all I can think of is it is sinus infection again. I will be seeing ENT doctor next week Wednesday . But for my I just try to make it better until then. [facility] my [facility] episode. Thank you. Marcela

## 2019-03-15 ENCOUNTER — PATIENT MESSAGE (OUTPATIENT)
Dept: MEDICAL GROUP | Facility: MEDICAL CENTER | Age: 44
End: 2019-03-15

## 2019-03-15 RX ORDER — SUMATRIPTAN 50 MG/1
50 TABLET, FILM COATED ORAL
Qty: 10 TAB | Refills: 3 | Status: SHIPPED | OUTPATIENT
Start: 2019-03-15 | End: 2019-08-30 | Stop reason: SDUPTHER

## 2019-03-15 NOTE — TELEPHONE ENCOUNTER
From: Marcela Tiwari  To: Ivan Armstrong M.D.  Sent: 3/15/2019 12:50 PM PDT  Subject: Prescription Question    Dr. Armstrong,   Can you please send my prescription for migraine sumatriptan to [facility]. Thank you . Marcela

## 2019-07-02 ENCOUNTER — OFFICE VISIT (OUTPATIENT)
Dept: MEDICAL GROUP | Facility: MEDICAL CENTER | Age: 44
End: 2019-07-02
Payer: COMMERCIAL

## 2019-07-02 VITALS
SYSTOLIC BLOOD PRESSURE: 108 MMHG | HEIGHT: 71 IN | BODY MASS INDEX: 26.6 KG/M2 | DIASTOLIC BLOOD PRESSURE: 64 MMHG | HEART RATE: 71 BPM | WEIGHT: 190 LBS | OXYGEN SATURATION: 99 % | TEMPERATURE: 98.2 F

## 2019-07-02 DIAGNOSIS — J31.0 CHRONIC RHINITIS: ICD-10-CM

## 2019-07-02 DIAGNOSIS — Z00.00 ANNUAL PHYSICAL EXAM: ICD-10-CM

## 2019-07-02 DIAGNOSIS — Z12.31 ENCOUNTER FOR SCREENING MAMMOGRAM FOR BREAST CANCER: ICD-10-CM

## 2019-07-02 DIAGNOSIS — R00.2 HEART PALPITATIONS: ICD-10-CM

## 2019-07-02 PROCEDURE — 99396 PREV VISIT EST AGE 40-64: CPT | Performed by: FAMILY MEDICINE

## 2019-07-02 ASSESSMENT — PATIENT HEALTH QUESTIONNAIRE - PHQ9: CLINICAL INTERPRETATION OF PHQ2 SCORE: 0

## 2019-07-02 NOTE — PROGRESS NOTES
"Subjective:   Marcela Tiwari is a 43 y.o. female here today for annual    About once a month patient feels a heart palpitation that lasts for a few seconds and then resolve spontaneously.  Patient does complain of fatigue but has a 20-month-old child and just got back from [place].    Patient had pansinusitis on recent MRI.  She had allergy testing done which showed no significant allergies.  She went to ENT and was told that she most likely needs sinus surgery.  She has been hesitant for sinus surgery because her symptoms have improved over the last 2 to 3 months.  She is no longer getting severe headaches and head pressure.    Current medicines (including changes today)  Current Outpatient Prescriptions   Medication Sig Dispense Refill   • SUMAtriptan (IMITREX) 50 MG Tab Take 1 Tab by mouth 1 time daily as needed for Migraine. 10 Tab 3   • gabapentin (NEURONTIN) 300 MG Cap Take 1 Cap by mouth 3 times a day. as needed for nerve pain 30 Cap 1   • baclofen (LIORESAL) 10 MG Tab Take 1 Tab by mouth 3 times a day. as needed for muscle spasm 30 Tab 1   • cetirizine (ZYRTEC) 10 MG Tab Take 10 mg by mouth every day.       No current facility-administered medications for this visit.      She  has a past medical history of Cancer (HCC) (2006) and Headache, classical migraine.    ROS   No chest pain, no shortness of breath, no abdominal pain       Objective:     /64 (BP Location: Right arm, Patient Position: Sitting)   Pulse 71   Temp 36.8 °C (98.2 °F)   Ht 1.803 m (5' 11\")   Wt 86.2 kg (190 lb)   SpO2 99%  Body mass index is 26.5 kg/m².   Physical Exam:  Constitutional: Alert, no distress.  Skin: Warm, dry, good turgor, no rashes in visible areas.  Eye: Equal, round and reactive, conjunctiva clear, lids normal.  Respiratory: Unlabored respiratory effort, lungs clear to auscultation, no wheezes, no ronchi.  Cardiovascular: Normal S1, S2, no murmur, no edema.  Psych: Alert and oriented x3, normal affect " and mood.        Assessment and Plan:   The following treatment plan was discussed    1. Annual physical exam  Check labs and call with results.  - CBC WITH DIFFERENTIAL; Future  - Comp Metabolic Panel; Future  - Lipid Profile; Future  - TSH WITH REFLEX TO FT4; Future  - VITAMIN D,25 HYDROXY; Future  - VITAMIN B12; Future  - CORTISOL; Future    2. Chronic rhinitis  Monitor.  Follow-up with ENT if needed.    3. Heart palpitations  Sounds like a PVC.  Patient is asymptomatic with these PVCs.  Continue to monitor.    4. Encounter for screening mammogram for breast cancer  - MA-SCREEN MAMMO W/CAD-BILAT; Future      Followup: Return in about 1 year (around 7/2/2020) for Annual.

## 2019-07-05 ENCOUNTER — HOSPITAL ENCOUNTER (OUTPATIENT)
Dept: LAB | Facility: MEDICAL CENTER | Age: 44
End: 2019-07-05
Attending: FAMILY MEDICINE
Payer: COMMERCIAL

## 2019-07-05 DIAGNOSIS — Z00.00 ANNUAL PHYSICAL EXAM: ICD-10-CM

## 2019-07-05 LAB
25(OH)D3 SERPL-MCNC: 17 NG/ML (ref 30–100)
ALBUMIN SERPL BCP-MCNC: 4.3 G/DL (ref 3.2–4.9)
ALBUMIN/GLOB SERPL: 1.5 G/DL
ALP SERPL-CCNC: 48 U/L (ref 30–99)
ALT SERPL-CCNC: 12 U/L (ref 2–50)
ANION GAP SERPL CALC-SCNC: 8 MMOL/L (ref 0–11.9)
AST SERPL-CCNC: 12 U/L (ref 12–45)
BASOPHILS # BLD AUTO: 0.7 % (ref 0–1.8)
BASOPHILS # BLD: 0.04 K/UL (ref 0–0.12)
BILIRUB SERPL-MCNC: 0.4 MG/DL (ref 0.1–1.5)
BUN SERPL-MCNC: 15 MG/DL (ref 8–22)
CALCIUM SERPL-MCNC: 9.2 MG/DL (ref 8.5–10.5)
CHLORIDE SERPL-SCNC: 104 MMOL/L (ref 96–112)
CHOLEST SERPL-MCNC: 161 MG/DL (ref 100–199)
CO2 SERPL-SCNC: 29 MMOL/L (ref 20–33)
CORTIS SERPL-MCNC: 11.3 UG/DL (ref 0–23)
CREAT SERPL-MCNC: 0.75 MG/DL (ref 0.5–1.4)
EOSINOPHIL # BLD AUTO: 0.23 K/UL (ref 0–0.51)
EOSINOPHIL NFR BLD: 4 % (ref 0–6.9)
ERYTHROCYTE [DISTWIDTH] IN BLOOD BY AUTOMATED COUNT: 47.2 FL (ref 35.9–50)
FASTING STATUS PATIENT QL REPORTED: NORMAL
GLOBULIN SER CALC-MCNC: 2.9 G/DL (ref 1.9–3.5)
GLUCOSE SERPL-MCNC: 89 MG/DL (ref 65–99)
HCT VFR BLD AUTO: 42.3 % (ref 37–47)
HDLC SERPL-MCNC: 68 MG/DL
HGB BLD-MCNC: 13.3 G/DL (ref 12–16)
IMM GRANULOCYTES # BLD AUTO: 0.01 K/UL (ref 0–0.11)
IMM GRANULOCYTES NFR BLD AUTO: 0.2 % (ref 0–0.9)
LDLC SERPL CALC-MCNC: 83 MG/DL
LYMPHOCYTES # BLD AUTO: 1.45 K/UL (ref 1–4.8)
LYMPHOCYTES NFR BLD: 25.2 % (ref 22–41)
MCH RBC QN AUTO: 30 PG (ref 27–33)
MCHC RBC AUTO-ENTMCNC: 31.4 G/DL (ref 33.6–35)
MCV RBC AUTO: 95.3 FL (ref 81.4–97.8)
MONOCYTES # BLD AUTO: 0.33 K/UL (ref 0–0.85)
MONOCYTES NFR BLD AUTO: 5.7 % (ref 0–13.4)
NEUTROPHILS # BLD AUTO: 3.69 K/UL (ref 2–7.15)
NEUTROPHILS NFR BLD: 64.2 % (ref 44–72)
NRBC # BLD AUTO: 0 K/UL
NRBC BLD-RTO: 0 /100 WBC
PLATELET # BLD AUTO: 259 K/UL (ref 164–446)
PMV BLD AUTO: 10.3 FL (ref 9–12.9)
POTASSIUM SERPL-SCNC: 4 MMOL/L (ref 3.6–5.5)
PROT SERPL-MCNC: 7.2 G/DL (ref 6–8.2)
RBC # BLD AUTO: 4.44 M/UL (ref 4.2–5.4)
SODIUM SERPL-SCNC: 141 MMOL/L (ref 135–145)
TRIGL SERPL-MCNC: 52 MG/DL (ref 0–149)
TSH SERPL DL<=0.005 MIU/L-ACNC: 1.26 UIU/ML (ref 0.38–5.33)
VIT B12 SERPL-MCNC: 312 PG/ML (ref 211–911)
WBC # BLD AUTO: 5.8 K/UL (ref 4.8–10.8)

## 2019-07-05 PROCEDURE — 36415 COLL VENOUS BLD VENIPUNCTURE: CPT

## 2019-07-05 PROCEDURE — 82306 VITAMIN D 25 HYDROXY: CPT

## 2019-07-05 PROCEDURE — 80061 LIPID PANEL: CPT

## 2019-07-05 PROCEDURE — 85025 COMPLETE CBC W/AUTO DIFF WBC: CPT

## 2019-07-05 PROCEDURE — 84443 ASSAY THYROID STIM HORMONE: CPT

## 2019-07-05 PROCEDURE — 80053 COMPREHEN METABOLIC PANEL: CPT

## 2019-07-05 PROCEDURE — 82607 VITAMIN B-12: CPT

## 2019-07-05 PROCEDURE — 82533 TOTAL CORTISOL: CPT

## 2019-07-08 ENCOUNTER — PATIENT MESSAGE (OUTPATIENT)
Dept: MEDICAL GROUP | Facility: MEDICAL CENTER | Age: 44
End: 2019-07-08

## 2019-07-08 PROBLEM — E55.9 VITAMIN D DEFICIENCY: Status: ACTIVE | Noted: 2019-07-08

## 2019-07-09 ENCOUNTER — OFFICE VISIT (OUTPATIENT)
Dept: MEDICAL GROUP | Facility: MEDICAL CENTER | Age: 44
End: 2019-07-09
Payer: COMMERCIAL

## 2019-07-09 VITALS
HEART RATE: 83 BPM | BODY MASS INDEX: 26.6 KG/M2 | HEIGHT: 71 IN | DIASTOLIC BLOOD PRESSURE: 66 MMHG | OXYGEN SATURATION: 94 % | TEMPERATURE: 98.4 F | WEIGHT: 190 LBS | SYSTOLIC BLOOD PRESSURE: 108 MMHG

## 2019-07-09 DIAGNOSIS — R10.30 LOWER ABDOMINAL PAIN: ICD-10-CM

## 2019-07-09 DIAGNOSIS — L30.9 ECZEMA, UNSPECIFIED TYPE: ICD-10-CM

## 2019-07-09 DIAGNOSIS — E55.9 VITAMIN D DEFICIENCY: ICD-10-CM

## 2019-07-09 PROBLEM — R10.10 PAIN OF UPPER ABDOMEN: Status: ACTIVE | Noted: 2019-07-09

## 2019-07-09 PROCEDURE — 99214 OFFICE O/P EST MOD 30 MIN: CPT | Performed by: FAMILY MEDICINE

## 2019-07-09 RX ORDER — TRIAMCINOLONE ACETONIDE 1 MG/G
1 CREAM TOPICAL 2 TIMES DAILY
Qty: 1 TUBE | Refills: 0 | Status: SHIPPED | OUTPATIENT
Start: 2019-07-09 | End: 2019-10-29

## 2019-07-09 NOTE — PROGRESS NOTES
"Subjective:   Marcela Tiwari is a 43 y.o. female here today for lower abdominal pain    Lower abdominal pain  Since returning from [place] she has had abdominal pain, has even woke her up at night. Some days it gets better, sometimes it gets worse. Has tried probiotics. Pain is lower abdominal, dull pain. Was suffering from constipation when returning from [place].  No dysuria.  Overall abdominal pain is improving.  She has not had any severe episodes that wake her up at night.    Eczema  Patient has noticed a new rash on her anterior and has never been present before.  She has tried coconut oil, which is helpful and she has tried over-the-counter cortisone for the last 2 days    Vitamin D deficiency  Patient is not taking vitamin D supplementation.  Vitamin D level is 17.         Current medicines (including changes today)  Current Outpatient Prescriptions   Medication Sig Dispense Refill   • triamcinolone acetonide (KENALOG) 0.1 % Cream Apply 1 Application to affected area(s) 2 times a day. 1 Tube 0   • SUMAtriptan (IMITREX) 50 MG Tab Take 1 Tab by mouth 1 time daily as needed for Migraine. 10 Tab 3   • gabapentin (NEURONTIN) 300 MG Cap Take 1 Cap by mouth 3 times a day. as needed for nerve pain 30 Cap 1   • baclofen (LIORESAL) 10 MG Tab Take 1 Tab by mouth 3 times a day. as needed for muscle spasm 30 Tab 1   • cetirizine (ZYRTEC) 10 MG Tab Take 10 mg by mouth every day.       No current facility-administered medications for this visit.      She  has a past medical history of Cancer (HCC) (2006) and Headache, classical migraine.    ROS   No chest pain, no shortness of breath       Objective:     /66 (BP Location: Right arm, Patient Position: Sitting)   Pulse 83   Temp 36.9 °C (98.4 °F)   Ht 1.803 m (5' 11\")   Wt 86.2 kg (190 lb)   SpO2 94%  Body mass index is 26.5 kg/m².   Physical Exam:  Constitutional: Alert, no distress.  Skin: Warm, dry, good turgor.  Erythema nonraised patch on anterior " neck with no drainage or discharge.  Eye: Equal, round and reactive, conjunctiva clear, lids normal.  Abdomen: Soft, non-tender, no masses, no hepatosplenomegaly.  Psych: Alert and oriented x3, normal affect and mood.        Assessment and Plan:   The following treatment plan was discussed    1. Lower abdominal pain  Most likely improving constipation from traveling.  Buys patient to do a cleanout with MiraLAX to see if this helps symptoms further.    2. Eczema, unspecified type  Most likely related to climate change when going from a human area [place] now [place] very [place] climate.  Advised regular moisturizer.  If no improvement over 1 week a prescription for Kenalog has been sent in.  - triamcinolone acetonide (KENALOG) 0.1 % Cream; Apply 1 Application to affected area(s) 2 times a day.  Dispense: 1 Tube; Refill: 0    3. Vitamin D deficiency  Start vitamin D3 5000 IU daily with food.        Followup: Return if symptoms worsen or fail to improve.

## 2019-07-09 NOTE — ASSESSMENT & PLAN NOTE
Patient has noticed a new rash on her anterior and has never been present before.  She has tried coconut oil, which is helpful and she has tried over-the-counter cortisone for the last 2 days

## 2019-07-09 NOTE — ASSESSMENT & PLAN NOTE
Since returning from [place] she has had abdominal pain, has even woke her up at night. Some days it gets better, sometimes it gets worse. Has tried probiotics. Pain is lower abdominal, dull pain. Was suffering from constipation when returning from [place].  No dysuria.  Overall abdominal pain is improving.  She has not had any severe episodes that wake her up at night.

## 2019-07-16 ENCOUNTER — PATIENT MESSAGE (OUTPATIENT)
Dept: MEDICAL GROUP | Facility: MEDICAL CENTER | Age: 44
End: 2019-07-16

## 2019-07-16 DIAGNOSIS — R21 RASH IN ADULT: ICD-10-CM

## 2019-08-30 ENCOUNTER — OFFICE VISIT (OUTPATIENT)
Dept: MEDICAL GROUP | Facility: MEDICAL CENTER | Age: 44
End: 2019-08-30
Payer: COMMERCIAL

## 2019-08-30 VITALS
DIASTOLIC BLOOD PRESSURE: 62 MMHG | TEMPERATURE: 98.1 F | HEIGHT: 70 IN | WEIGHT: 191 LBS | SYSTOLIC BLOOD PRESSURE: 106 MMHG | HEART RATE: 73 BPM | BODY MASS INDEX: 27.35 KG/M2 | OXYGEN SATURATION: 95 %

## 2019-08-30 DIAGNOSIS — J01.00 ACUTE NON-RECURRENT MAXILLARY SINUSITIS: ICD-10-CM

## 2019-08-30 DIAGNOSIS — G43.009 MIGRAINE WITHOUT AURA AND WITHOUT STATUS MIGRAINOSUS, NOT INTRACTABLE: ICD-10-CM

## 2019-08-30 PROCEDURE — 99214 OFFICE O/P EST MOD 30 MIN: CPT | Performed by: FAMILY MEDICINE

## 2019-08-30 RX ORDER — AMOXICILLIN AND CLAVULANATE POTASSIUM 875; 125 MG/1; MG/1
1 TABLET, FILM COATED ORAL 2 TIMES DAILY
Qty: 20 TAB | Refills: 0 | Status: SHIPPED | OUTPATIENT
Start: 2019-08-30 | End: 2019-09-09

## 2019-08-30 RX ORDER — SUMATRIPTAN 50 MG/1
50 TABLET, FILM COATED ORAL
Qty: 10 TAB | Refills: 3 | Status: SHIPPED | OUTPATIENT
Start: 2019-08-30 | End: 2020-02-20 | Stop reason: SDUPTHER

## 2019-09-04 ENCOUNTER — PATIENT MESSAGE (OUTPATIENT)
Dept: MEDICAL GROUP | Facility: MEDICAL CENTER | Age: 44
End: 2019-09-04

## 2019-09-04 DIAGNOSIS — J32.9 RHINOSINUSITIS: ICD-10-CM

## 2019-09-04 RX ORDER — PREDNISONE 10 MG/1
30 TABLET ORAL EVERY MORNING
Qty: 21 TAB | Refills: 0 | Status: SHIPPED | OUTPATIENT
Start: 2019-09-04 | End: 2019-09-11

## 2019-10-29 ENCOUNTER — OFFICE VISIT (OUTPATIENT)
Dept: URGENT CARE | Facility: CLINIC | Age: 44
End: 2019-10-29
Payer: COMMERCIAL

## 2019-10-29 VITALS
RESPIRATION RATE: 20 BRPM | SYSTOLIC BLOOD PRESSURE: 122 MMHG | BODY MASS INDEX: 26.54 KG/M2 | WEIGHT: 185 LBS | DIASTOLIC BLOOD PRESSURE: 80 MMHG | OXYGEN SATURATION: 98 % | HEART RATE: 92 BPM | TEMPERATURE: 98.6 F

## 2019-10-29 DIAGNOSIS — J06.9 URI WITH COUGH AND CONGESTION: ICD-10-CM

## 2019-10-29 DIAGNOSIS — L03.312 CELLULITIS OF SKIN OF BACK: ICD-10-CM

## 2019-10-29 DIAGNOSIS — J01.40 ACUTE NON-RECURRENT PANSINUSITIS: Primary | ICD-10-CM

## 2019-10-29 LAB
FLUAV+FLUBV AG SPEC QL IA: NEGATIVE
INT CON NEG: NORMAL
INT CON POS: NORMAL

## 2019-10-29 PROCEDURE — 99214 OFFICE O/P EST MOD 30 MIN: CPT | Performed by: PHYSICIAN ASSISTANT

## 2019-10-29 PROCEDURE — 87804 INFLUENZA ASSAY W/OPTIC: CPT | Performed by: PHYSICIAN ASSISTANT

## 2019-10-29 RX ORDER — DEXTROMETHORPHAN HYDROBROMIDE AND PROMETHAZINE HYDROCHLORIDE 15; 6.25 MG/5ML; MG/5ML
5 SYRUP ORAL EVERY 4 HOURS PRN
Qty: 120 ML | Refills: 0 | Status: SHIPPED
Start: 2019-10-29 | End: 2020-02-20

## 2019-10-29 RX ORDER — SULFAMETHOXAZOLE AND TRIMETHOPRIM 800; 160 MG/1; MG/1
1 TABLET ORAL 2 TIMES DAILY
Qty: 14 TAB | Refills: 0 | Status: SHIPPED | OUTPATIENT
Start: 2019-10-29 | End: 2019-11-05

## 2019-10-29 NOTE — PROGRESS NOTES
"Subjective:      Pt is a 43 y.o. female who presents with Generalized Body Aches (Since yesterday  bodyache , headache , nauseas .)            HPI  This is a new problem. PT presents to  clinic today complaining of sore throat, fevers, chills, body aches, watery eyes, pressure in ears, cough, fatigue, runny nose. PT denies CP, SOB, NVD, abdominal pain, joint pain. PT states these symptoms began around 1 day ago. Pt also notes painful \"red spot\" on upper back x 3-4 days.  PT states the pain is a 7/10, aching in nature and worse at night.  Pt has not taken any RX medications for this condition. The pt's medication list, problem list, and allergies have been evaluated and reviewed during today's visit.        PMH:  Past Medical History:   Diagnosis Date   • Cancer (HCC)     ramos's sarcoma.   • Headache, classical migraine        PSH:  Past Surgical History:   Procedure Laterality Date   • AMPUTATION, BELOW THE KNEE     • BUNIONECTOMY         Fam Hx:    family history includes Cancer in her paternal grandmother; Other (age of onset: 48) in her mother.  Family Status   Relation Name Status   • Mo  Alive        anemia   • Fa     • PGMo         Soc HX:  Social History     Socioeconomic History   • Marital status: Single     Spouse name: Not on file   • Number of children: Not on file   • Years of education: Not on file   • Highest education level: Not on file   Occupational History   • Not on file   Social Needs   • Financial resource strain: Not on file   • Food insecurity:     Worry: Not on file     Inability: Not on file   • Transportation needs:     Medical: Not on file     Non-medical: Not on file   Tobacco Use   • Smoking status: Never Smoker   • Smokeless tobacco: Never Used   Substance and Sexual Activity   • Alcohol use: Yes     Alcohol/week: 2.0 oz     Types: 4 Glasses of wine per week   • Drug use: No   • Sexual activity: Yes     Partners: Male   Lifestyle   • Physical activity:   " Days per week: Not on file     Minutes per session: Not on file   • Stress: Not on file   Relationships   • Social connections:     Talks on phone: Not on file     Gets together: Not on file     Attends Sabianist service: Not on file     Active member of club or organization: Not on file     Attends meetings of clubs or organizations: Not on file     Relationship status: Not on file   • Intimate partner violence:     Fear of current or ex partner: Not on file     Emotionally abused: Not on file     Physically abused: Not on file     Forced sexual activity: Not on file   Other Topics Concern   • Not on file   Social History Narrative   • Not on file         Medications:    Current Outpatient Medications:   •  SUMAtriptan (IMITREX) 50 MG Tab, Take 1 Tab by mouth 1 time daily as needed for Migraine., Disp: 10 Tab, Rfl: 3  •  cetirizine (ZYRTEC) 10 MG Tab, Take 10 mg by mouth every day., Disp: , Rfl:   •  gabapentin (NEURONTIN) 300 MG Cap, Take 1 Cap by mouth 3 times a day. as needed for nerve pain, Disp: 30 Cap, Rfl: 1      Allergies:  Seasonal    ROS  Constitutional: Positive for chills, fevers, body aches and malaise/fatigue.   HENT: Positive for congestion and sore throat. Negative for ear pain.    Eyes: Negative for blurred vision, double vision and photophobia.   Respiratory: Positive for cough and sputum production. Negative for hemoptysis, shortness of breath and wheezing.    Cardiovascular: Negative for chest pain and palpitations.   Gastrointestinal: Negative for nausea, vomiting, abdominal pain, diarrhea and constipation.   Genitourinary: Negative for dysuria and flank pain.   Musculoskeletal: Negative for falls and myalgias.   Skin: POS for infected wound of skin.   Neurological: Positive for headaches. Negative for dizziness and tingling.   Endo/Heme/Allergies: Does not bruise/bleed easily.   Psychiatric/Behavioral: Negative for depression. The patient is not nervous/anxious.             Objective:     BP  122/80   Pulse 92   Temp 37 °C (98.6 °F) (Temporal)   Resp 20   Wt 83.9 kg (185 lb)   SpO2 98%   BMI 26.54 kg/m²      Physical Exam   Skin: Skin is warm. Capillary refill takes less than 2 seconds. Abrasion noted. No bruising, no burn, no ecchymosis, no laceration, no lesion, no petechiae and no rash noted. There is erythema. No cyanosis. Nails show no clubbing.          Constitutional: PT is oriented to person, place, and time. PT appears well-developed and well-nourished. No distress.   HENT:   Head: Normocephalic and atraumatic.   Right Ear: Hearing, tympanic membrane, external ear and ear canal normal.   Left Ear: Hearing, tympanic membrane, external ear and ear canal normal.   Nose: Mucosal edema, rhinorrhea and sinus tenderness present. Right sinus exhibits frontal sinus tenderness. Left sinus exhibits frontal sinus tenderness.   Mouth/Throat: Uvula is midline. Mucous membranes are pale. Posterior oropharyngeal edema and posterior oropharyngeal erythema with exudate noted on exam.   Eyes: Conjunctivae normal and EOM are normal. Pupils are equal, round, and reactive to light.   Neck: Normal range of motion. Neck supple. No thyromegaly present.   Cardiovascular: Normal rate, regular rhythm, normal heart sounds and intact distal pulses.  Exam reveals no gallop and no friction rub.    No murmur heard.  Pulmonary/Chest: Effort normal and breath sounds normal. No respiratory distress. PT has no wheezes. PT has no rales. PT exhibits no tenderness.   Abdominal: Soft. Bowel sounds are normal. PT exhibits no distension and no mass. There is no tenderness. There is no rebound and no guarding.   Musculoskeletal: Normal range of motion. PT exhibits no edema and no tenderness.   Lymphadenopathy:     PT has no cervical adenopathy.   Neurological: PT is alert and oriented to person, place, and time. PT displays normal reflexes. No cranial nerve deficit. PT exhibits normal muscle tone. Coordination normal.   Psychiatric:  PT has a normal mood and affect. PT behavior is normal. Judgment and thought content normal.             Assessment/Plan:     1. Acute non-recurrent pansinusitis      2. URI with cough and congestion    - POCT Influenza A/B-->NEG    3. Cellulitis of skin of back    Bactrim  Promethazine DM  Rest, fluids encouraged.  OTC decongestant for congestion/cough  AVS with medical info given.  Pt was in full understanding and agreement with the plan.  Differential diagnosis, natural history, supportive care, and indications for immediate follow-up discussed. All questions answered. Patient agrees with the plan of care.  Follow-up as needed if symptoms worsen or fail to improve.

## 2019-11-15 ENCOUNTER — HOSPITAL ENCOUNTER (OUTPATIENT)
Dept: RADIOLOGY | Facility: MEDICAL CENTER | Age: 44
End: 2019-11-15
Attending: FAMILY MEDICINE
Payer: COMMERCIAL

## 2019-11-15 DIAGNOSIS — Z12.31 ENCOUNTER FOR SCREENING MAMMOGRAM FOR BREAST CANCER: ICD-10-CM

## 2019-11-15 PROCEDURE — 77063 BREAST TOMOSYNTHESIS BI: CPT

## 2019-11-18 ENCOUNTER — TELEPHONE (OUTPATIENT)
Dept: MEDICAL GROUP | Facility: MEDICAL CENTER | Age: 44
End: 2019-11-18

## 2019-11-18 NOTE — TELEPHONE ENCOUNTER
----- Message from Ivan Armstrong M.D. sent at 11/18/2019 10:11 AM PST -----  Please notify the patient that her mammogram shows that she has high breast density. This can lower the accuracy of mammograms. There is a screening ultrasound that can help to detect breast cancers in women with high breast density.  The screening ultrasound is not covered with insurance and cost around $125.  If she would like for me to order the screening ultrasound, please let me know.  Ivan Armstrong M.D.

## 2020-01-17 ENCOUNTER — PATIENT MESSAGE (OUTPATIENT)
Dept: MEDICAL GROUP | Facility: MEDICAL CENTER | Age: 45
End: 2020-01-17

## 2020-01-17 DIAGNOSIS — R68.82 LOW LIBIDO: ICD-10-CM

## 2020-02-20 ENCOUNTER — OFFICE VISIT (OUTPATIENT)
Dept: MEDICAL GROUP | Facility: MEDICAL CENTER | Age: 45
End: 2020-02-20
Payer: COMMERCIAL

## 2020-02-20 VITALS
TEMPERATURE: 97.9 F | HEIGHT: 70 IN | HEART RATE: 72 BPM | BODY MASS INDEX: 28.06 KG/M2 | SYSTOLIC BLOOD PRESSURE: 106 MMHG | WEIGHT: 196 LBS | DIASTOLIC BLOOD PRESSURE: 60 MMHG | OXYGEN SATURATION: 96 %

## 2020-02-20 DIAGNOSIS — E55.9 VITAMIN D DEFICIENCY: ICD-10-CM

## 2020-02-20 DIAGNOSIS — G43.109 MIGRAINE WITH AURA AND WITHOUT STATUS MIGRAINOSUS, NOT INTRACTABLE: ICD-10-CM

## 2020-02-20 DIAGNOSIS — R92.30 DENSE BREAST TISSUE ON MAMMOGRAM: ICD-10-CM

## 2020-02-20 DIAGNOSIS — N95.1 PERIMENOPAUSAL: ICD-10-CM

## 2020-02-20 DIAGNOSIS — Z89.519 S/P BKA (BELOW KNEE AMPUTATION) UNILATERAL (HCC): ICD-10-CM

## 2020-02-20 PROCEDURE — 99214 OFFICE O/P EST MOD 30 MIN: CPT | Performed by: FAMILY MEDICINE

## 2020-02-20 RX ORDER — SUMATRIPTAN 50 MG/1
50 TABLET, FILM COATED ORAL
Qty: 10 TAB | Refills: 3 | Status: SHIPPED | OUTPATIENT
Start: 2020-02-20 | End: 2020-09-09

## 2020-02-20 ASSESSMENT — PATIENT HEALTH QUESTIONNAIRE - PHQ9: CLINICAL INTERPRETATION OF PHQ2 SCORE: 0

## 2020-02-20 NOTE — ASSESSMENT & PLAN NOTE
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 Needs a new liner every 2 months because otherwise it causes rashes and skin irritation. Every 2 months she needs new sock because they wear out. She is also needing a new locker because of change increased BKA stump size. Needs new protective cover and exoskeleton system, because it is breaking down.

## 2020-02-20 NOTE — PROGRESS NOTES
"Subjective:   Marcela Tiwari is a 44 y.o. female here today for migraine, BKA    Migraines  Started 4 days ago with vision changes in right eye, then dizziness. She laid down for an hour and woke up with a massive headache. Took Imitrex 50 mg once then 2 hours later. Then she took benadryl and went to sleep, the headache improved about 70%.    Has had migraines for 15 years, but this was worst ever. Father  of brain aneurysm.    Perimenopausal  Has been having irregular periods, which she has had after chemo in the past.  She does have migraines with auras.    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 Needs a new liner every 2 months because otherwise it causes rashes and skin irritation. Every 2 months she needs new sock because they wear out. She is also needing a new locker because of change increased BKA stump size. Needs new protective cover and exoskeleton system, because it is breaking down.     Vitamin D deficiency  Taking vitamin D 4000 IU supplementation daily.    Dense breast tissue on mammogram  Patient has dense breast tissue on mammography.  Her paternal grandmother had breast cancer.         Current medicines (including changes today)  Current Outpatient Medications   Medication Sig Dispense Refill   • SUMAtriptan (IMITREX) 50 MG Tab Take 1 Tab by mouth 1 time daily as needed for Migraine. 10 Tab 3   • gabapentin (NEURONTIN) 300 MG Cap Take 1 Cap by mouth 3 times a day. as needed for nerve pain 30 Cap 1   • cetirizine (ZYRTEC) 10 MG Tab Take 10 mg by mouth every day.       No current facility-administered medications for this visit.      She  has a past medical history of Cancer (HCC) () and Headache, classical migraine.    ROS   Positive headaches, positive irregular periods       Objective:     /60 (BP Location: Right arm, Patient Position: Sitting)   Pulse 72   Temp 36.6 °C (97.9 °F)   Ht 1.778 m (5' 10\")   Wt 88.9 kg (196 lb)  "  SpO2 96%  Body mass index is 28.12 kg/m².   Physical Exam:  Constitutional: Alert, no distress.  Skin: Warm, dry, good turgor, no rashes in visible areas.  Eye: Equal, round and reactive, conjunctiva clear, lids normal.  Psych: Alert and oriented x3, normal affect and mood.  Left BKA with mild erythema without drainage or discharge underlining.      Assessment and Plan:   The following treatment plan was discussed    1. Migraine with aura and without status migrainosus, not intractable  Advised patient to use Imitrex as soon as she has aura symptoms to abort headache.  Discussed preventative medications but she is not having headaches frequently enough to justify daily preventative medication.    2. Dense breast tissue on mammogram  Screening breast ultrasound ordered.  - US-SCREENING WHOLE BREAST BILATERAL (3D SCREENING); Future    3. Perimenopausal  Unfortunately patient has migraine with aura so we would probably not recommend estrogen treatment for increased risk of stroke.  Advised over-the-counter menopausal treatments.    4. S/P BKA (below knee amputation) unilateral (HCC)  We will submit prescription for new supplies for the next year.    5. Vitamin D deficiency  Check labs and message with results.  - VITAMIN D,25 HYDROXY; Future      Followup: Return in about 5 months (around 7/20/2020) for Annual.

## 2020-02-20 NOTE — ASSESSMENT & PLAN NOTE
Started 4 days ago with vision changes in right eye, then dizziness. She laid down for an hour and woke up with a massive headache. Took Imitrex 50 mg once then 2 hours later. Then she took benadryl and went to sleep, the headache improved about 70%.    Has had migraines for 15 years, but this was worst ever. Father  of brain aneurysm.

## 2020-02-20 NOTE — ASSESSMENT & PLAN NOTE
Has been having irregular periods, which she has had after chemo in the past.  She does have migraines with auras.

## 2020-02-24 ENCOUNTER — HOSPITAL ENCOUNTER (OUTPATIENT)
Dept: LAB | Facility: MEDICAL CENTER | Age: 45
End: 2020-02-24
Attending: FAMILY MEDICINE
Payer: COMMERCIAL

## 2020-02-24 DIAGNOSIS — E55.9 VITAMIN D DEFICIENCY: ICD-10-CM

## 2020-02-24 DIAGNOSIS — R68.82 LOW LIBIDO: ICD-10-CM

## 2020-02-24 LAB — PROGEST SERPL-MCNC: 0.35 NG/ML

## 2020-02-24 PROCEDURE — 82533 TOTAL CORTISOL: CPT

## 2020-02-24 PROCEDURE — 84270 ASSAY OF SEX HORMONE GLOBUL: CPT

## 2020-02-24 PROCEDURE — 36415 COLL VENOUS BLD VENIPUNCTURE: CPT

## 2020-02-24 PROCEDURE — 82306 VITAMIN D 25 HYDROXY: CPT

## 2020-02-24 PROCEDURE — 84403 ASSAY OF TOTAL TESTOSTERONE: CPT

## 2020-02-24 PROCEDURE — 84144 ASSAY OF PROGESTERONE: CPT

## 2020-02-24 PROCEDURE — 84402 ASSAY OF FREE TESTOSTERONE: CPT

## 2020-02-24 PROCEDURE — 82670 ASSAY OF TOTAL ESTRADIOL: CPT

## 2020-02-25 LAB
25(OH)D3 SERPL-MCNC: 31 NG/ML (ref 30–100)
CORTIS SERPL-MCNC: 9.5 UG/DL (ref 0–23)
ESTRADIOL SERPL-MCNC: 422 PG/ML

## 2020-02-27 LAB
SHBG SERPL-SCNC: 147 NMOL/L (ref 30–135)
TESTOST FREE SERPL-MCNC: 1.4 PG/ML (ref 1.1–5.8)
TESTOST SERPL-MCNC: 24 NG/DL (ref 9–55)

## 2020-03-05 ENCOUNTER — OFFICE VISIT (OUTPATIENT)
Dept: URGENT CARE | Facility: CLINIC | Age: 45
End: 2020-03-05
Payer: COMMERCIAL

## 2020-03-05 VITALS
SYSTOLIC BLOOD PRESSURE: 100 MMHG | WEIGHT: 192 LBS | DIASTOLIC BLOOD PRESSURE: 62 MMHG | OXYGEN SATURATION: 99 % | HEART RATE: 75 BPM | BODY MASS INDEX: 27.49 KG/M2 | HEIGHT: 70 IN | RESPIRATION RATE: 16 BRPM | TEMPERATURE: 99.1 F

## 2020-03-05 DIAGNOSIS — L50.9 URTICARIA: ICD-10-CM

## 2020-03-05 PROCEDURE — 99214 OFFICE O/P EST MOD 30 MIN: CPT | Performed by: PHYSICIAN ASSISTANT

## 2020-03-05 RX ORDER — AMOXICILLIN AND CLAVULANATE POTASSIUM 875; 125 MG/1; MG/1
1 TABLET, FILM COATED ORAL
Status: SHIPPED | COMMUNITY
Start: 2020-01-29 | End: 2020-10-01

## 2020-03-05 RX ORDER — SUMATRIPTAN 20 MG/1
SPRAY NASAL
COMMUNITY
Start: 2019-12-10 | End: 2021-05-19 | Stop reason: SDUPTHER

## 2020-03-05 RX ORDER — SUMATRIPTAN 100 MG/1
TABLET, FILM COATED ORAL
Status: SHIPPED | COMMUNITY
Start: 2019-12-10 | End: 2020-10-01

## 2020-03-05 RX ORDER — METHYLPREDNISOLONE 4 MG/1
TABLET ORAL
COMMUNITY
Start: 2020-01-29 | End: 2020-03-05

## 2020-03-05 RX ORDER — PREDNISONE 20 MG/1
40 TABLET ORAL DAILY
Qty: 10 TAB | Refills: 0 | Status: SHIPPED | OUTPATIENT
Start: 2020-03-05 | End: 2020-03-10

## 2020-03-05 ASSESSMENT — ENCOUNTER SYMPTOMS
FACIAL SWELLING: 1
DIZZINESS: 1
SHORTNESS OF BREATH: 0
GASTROINTESTINAL NEGATIVE: 1
WHEEZING: 0
SORE THROAT: 0
FEVER: 0
CHILLS: 0
COUGH: 0

## 2020-03-05 ASSESSMENT — FIBROSIS 4 INDEX: FIB4 SCORE: 0.56

## 2020-03-05 NOTE — PROGRESS NOTES
Subjective:      Marcela Tiwari is a 44 y.o. female who presents with Facial Swelling (eye swelling, dizziness started today )            Facial Swelling   Associated symptoms include a rash. Pertinent negatives include no chest pain, chills, coughing, fever or sore throat.   The patient reports symptoms starting 2 nights ago, with a red blotchy area to her left neck with associated itching.  She took Benadryl last night and she is unsure how much it has helped.  She woke up this morning with mild facial swelling and some red blotchy rashes to her left face, eye lids and some swelling.  She states the rash itches.  She did get Botox injections last Friday for the first time to her forehead.  She is unsure if this may be the cause of her rash.    She also complains of some dizziness.  Describes the dizziness as a lightheadedness.  She denies any pain or loss of consciousness.  She states 2 years ago she tried artificial eyelashes and 6 months later she had very similar symptoms of allergic reaction.  She has a history of chronic rhinitis and migraines and eczema.  She denies other any pertinent past medical history    Review of Systems   Constitutional: Negative for chills and fever.   HENT: Positive for facial swelling. Negative for sore throat.         Getting over a sinus infection   Respiratory: Negative for cough, shortness of breath and wheezing.    Cardiovascular: Negative for chest pain.   Gastrointestinal: Negative.    Skin: Positive for itching and rash.   Neurological: Positive for dizziness.          Objective:     There were no vitals taken for this visit.     Physical Exam  Vitals signs reviewed.   Constitutional:       General: She is not in acute distress.     Appearance: Normal appearance. She is not ill-appearing.   HENT:      Head:        Comments: Erythematous dry macule located to left neck.   Pink urticarial rash to bilateral eye lashes and left face.   No fluctuance, crepitus,  drainage, streaking  Very mild edema.        Right Ear: Tympanic membrane normal.      Left Ear: Tympanic membrane normal.      Mouth/Throat:      Lips: Pink.      Mouth: Mucous membranes are dry.      Tongue: No lesions.      Pharynx: Oropharynx is clear. Uvula midline. No pharyngeal swelling, oropharyngeal exudate, posterior oropharyngeal erythema or uvula swelling.      Tonsils: No tonsillar exudate or tonsillar abscesses.   Eyes:      Extraocular Movements: Extraocular movements intact.      Conjunctiva/sclera: Conjunctivae normal.      Pupils: Pupils are equal, round, and reactive to light.   Neck:      Musculoskeletal: No edema, erythema or neck rigidity.      Trachea: Trachea normal.   Cardiovascular:      Rate and Rhythm: Normal rate and regular rhythm.      Heart sounds: Normal heart sounds.   Pulmonary:      Effort: Pulmonary effort is normal. No respiratory distress.      Breath sounds: Normal breath sounds. No wheezing, rhonchi or rales.   Lymphadenopathy:      Cervical: No cervical adenopathy.   Skin:     General: Skin is warm and dry.   Neurological:      General: No focal deficit present.      Mental Status: She is alert and oriented to person, place, and time.      Cranial Nerves: No cranial nerve deficit.   Psychiatric:         Mood and Affect: Mood normal.         Behavior: Behavior normal.       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   • Headache, classical migraine       Past Surgical History:   Procedure Laterality Date   • AMPUTATION, BELOW THE KNEE  2011   • BUNIONECTOMY        Social History     Socioeconomic History   • Marital status: Single     Spouse name: Not on file   • Number of children: Not on file   • Years of education: Not on file   • Highest education level: Not on file   Occupational History   • Not on file   Social Needs   • Financial resource strain: Not on file   • Food insecurity     Worry: Not on file     Inability: Not on file   •  Transportation needs     Medical: Not on file     Non-medical: Not on file   Tobacco Use   • Smoking status: Never Smoker   • Smokeless tobacco: Never Used   Substance and Sexual Activity   • Alcohol use: Yes     Alcohol/week: 2.0 oz     Types: 4 Glasses of wine per week   • Drug use: No   • Sexual activity: Yes     Partners: Male   Lifestyle   • Physical activity     Days per week: Not on file     Minutes per session: Not on file   • Stress: Not on file   Relationships   • Social connections     Talks on phone: Not on file     Gets together: Not on file     Attends Taoist service: Not on file     Active member of club or organization: Not on file     Attends meetings of clubs or organizations: Not on file     Relationship status: Not on file   • Intimate partner violence     Fear of current or ex partner: Not on file     Emotionally abused: Not on file     Physically abused: Not on file     Forced sexual activity: Not on file   Other Topics Concern   • Not on file   Social History Narrative   • Not on file    Seasonal       Assessment/Plan:     1. Urticaria    - predniSONE (DELTASONE) 20 MG Tab; Take 2 Tabs by mouth every day for 5 days.  Dispense: 10 Tab; Refill: 0    Discussed with patient her signs and symptoms appear to be consistent with an allergic reaction due to unknown etiology.  Is possible delayed reaction.  Discussed differential of atopic dermatitis.  Due to the reaction on the face, we will treat with oral steroids for 5 days.  Also recommended Benadryl at night, Cetaphil moisturizer to her face.  Avoid any make-up, artificial lashes, or any other chemical to her face.     Overall, the patient is well-appearing, normal vital signs, no signs of respiratory distress or any emergent pathology.     Advised the patient to present to the emergency room with any worsening facial swelling, rash, difficulty breathing, wheezing, visual changes, worsening dizziness or any other concerns.  Return to the urgent  care if symptoms are not improving within the next day or 2.  Encourage plenty fluids for her dizziness.    Supportive care, differential diagnoses, and indications for immediate follow-up discussed with patient.    Pathogenesis of diagnosis discussed including typical length and natural progression. Patient expresses understanding and agrees to plan.    Please note that this dictation was created using voice recognition software. I have made every reasonable attempt to correct obvious errors, but I expect that there are errors of grammar and possibly content that I did not discover before finalizing the note.

## 2020-04-20 ENCOUNTER — OFFICE VISIT (OUTPATIENT)
Dept: MEDICAL GROUP | Facility: MEDICAL CENTER | Age: 45
End: 2020-04-20
Payer: COMMERCIAL

## 2020-04-20 VITALS
TEMPERATURE: 98.2 F | OXYGEN SATURATION: 97 % | DIASTOLIC BLOOD PRESSURE: 72 MMHG | HEIGHT: 70 IN | HEART RATE: 79 BPM | BODY MASS INDEX: 28.35 KG/M2 | SYSTOLIC BLOOD PRESSURE: 106 MMHG | WEIGHT: 198 LBS

## 2020-04-20 DIAGNOSIS — T30.0 BURN INJURY: ICD-10-CM

## 2020-04-20 PROCEDURE — 99213 OFFICE O/P EST LOW 20 MIN: CPT | Performed by: FAMILY MEDICINE

## 2020-04-20 RX ORDER — ZINC OXIDE
OINTMENT (GRAM) TOPICAL
Qty: 1 TUBE | Refills: 3 | Status: SHIPPED | OUTPATIENT
Start: 2020-04-20 | End: 2020-10-01

## 2020-04-20 ASSESSMENT — FIBROSIS 4 INDEX: FIB4 SCORE: 0.56

## 2020-04-20 NOTE — PROGRESS NOTES
"Subjective:   Marcela Tiwari is a 44 y.o. female here today for burn injury    Burn injury  Has a new injury posterior to right ear and right 2nd finger. Finger burn came from cooking, but right ear cause unknown.  No significant drainage.         Current medicines (including changes today)  Current Outpatient Medications   Medication Sig Dispense Refill   • zinc oxide (DESITIN) 40 % Ointment 1 application three times a day for skin burn on right hand and right face. 1 Tube 3   • sumatriptan (IMITREX) 100 MG tablet TAKE 1 TABLET BY MOUTH ONCE DAILY WITH FLUIDS AS EARLY AS POSSIBLE AFTER ONSET OF MIGRAINE ATTACK MAY REPEAT AFTER 2 HOURS IF HEADACHE RETUR     • sumatriptan (IMITREX) 20 MG/ACT nasal spray USE 1 SPRAY(S) ONCE DAILY IF HEADACHE RETURNS DOSE MAY BE REPEATED ONCE AFTER 2 HOURS NOT TO EXCEED 40 MG PER DAY FOR 30 DAYS     • amoxicillin-clavulanate (AUGMENTIN) 875-125 MG Tab Take 1 Tab by mouth.     • SUMAtriptan (IMITREX) 50 MG Tab Take 1 Tab by mouth 1 time daily as needed for Migraine. 10 Tab 3   • cetirizine (ZYRTEC) 10 MG Tab Take 10 mg by mouth every day.     • gabapentin (NEURONTIN) 300 MG Cap Take 1 Cap by mouth 3 times a day. as needed for nerve pain (Patient not taking: Reported on 3/5/2020) 30 Cap 1     No current facility-administered medications for this visit.      She  has a past medical history of Cancer (HCC) (2006) and Headache, classical migraine.    ROS   No fever       Objective:     /72 (BP Location: Right arm, Patient Position: Sitting, BP Cuff Size: Adult)   Pulse 79   Temp 36.8 °C (98.2 °F) (Temporal)   Ht 1.778 m (5' 10\")   Wt 89.8 kg (198 lb)   SpO2 97%  Body mass index is 28.41 kg/m².   Physical Exam:  Constitutional: Alert, no distress.  Skin: Right second finger with ulceration and small overlying eschar, no significant surrounding erythema. Right posterior to ear ulceration with early overlying eschar and no significant surrounding erythema  Psych: Alert " and oriented x3, normal affect and mood.        Assessment and Plan:   The following treatment plan was discussed    1. Burn injury  New problem.  Prescription for zinc oxide.  Discussed avoiding burns in the future by using more gloves when cooking.  - zinc oxide (DESITIN) 40 % Ointment; 1 application three times a day for skin burn on right hand and right face.  Dispense: 1 Tube; Refill: 3      Followup: Return if symptoms worsen or fail to improve.

## 2020-04-20 NOTE — ASSESSMENT & PLAN NOTE
Has a new injury posterior to right ear and right 2nd finger. Finger burn came from cooking, but right ear cause unknown.  No significant drainage.

## 2020-06-15 ENCOUNTER — PATIENT MESSAGE (OUTPATIENT)
Dept: MEDICAL GROUP | Facility: MEDICAL CENTER | Age: 45
End: 2020-06-15

## 2020-06-15 DIAGNOSIS — J31.0 CHRONIC RHINITIS: ICD-10-CM

## 2020-08-31 ENCOUNTER — PATIENT MESSAGE (OUTPATIENT)
Dept: MEDICAL GROUP | Facility: MEDICAL CENTER | Age: 45
End: 2020-08-31

## 2020-08-31 DIAGNOSIS — Z11.59 ENCOUNTER FOR SCREENING FOR OTHER VIRAL DISEASES: ICD-10-CM

## 2020-09-02 ENCOUNTER — OFFICE VISIT (OUTPATIENT)
Dept: MEDICAL GROUP | Facility: MEDICAL CENTER | Age: 45
End: 2020-09-02
Payer: COMMERCIAL

## 2020-09-02 VITALS
OXYGEN SATURATION: 97 % | HEIGHT: 70 IN | SYSTOLIC BLOOD PRESSURE: 118 MMHG | WEIGHT: 198 LBS | HEART RATE: 86 BPM | DIASTOLIC BLOOD PRESSURE: 70 MMHG | TEMPERATURE: 97.2 F | BODY MASS INDEX: 28.35 KG/M2

## 2020-09-02 DIAGNOSIS — G43.109 MIGRAINE WITH AURA AND WITHOUT STATUS MIGRAINOSUS, NOT INTRACTABLE: ICD-10-CM

## 2020-09-02 DIAGNOSIS — Z00.00 ANNUAL PHYSICAL EXAM: ICD-10-CM

## 2020-09-02 DIAGNOSIS — F43.9 STRESS: ICD-10-CM

## 2020-09-02 DIAGNOSIS — R51.9 SINUS HEADACHE: ICD-10-CM

## 2020-09-02 PROCEDURE — 99214 OFFICE O/P EST MOD 30 MIN: CPT | Performed by: FAMILY MEDICINE

## 2020-09-02 RX ORDER — BECLOMETHASONE DIPROPIONATE HFA 80 UG/1
1 AEROSOL, METERED RESPIRATORY (INHALATION) 2 TIMES DAILY
COMMUNITY
End: 2021-01-20

## 2020-09-02 RX ORDER — ESCITALOPRAM OXALATE 10 MG/1
10 TABLET ORAL DAILY
Qty: 30 TAB | Refills: 5 | Status: SHIPPED | OUTPATIENT
Start: 2020-09-02 | End: 2021-01-20

## 2020-09-02 ASSESSMENT — FIBROSIS 4 INDEX: FIB4 SCORE: 0.56

## 2020-09-02 NOTE — ASSESSMENT & PLAN NOTE
Has been having migraines 10 days which has required sumatriptan nightly for 10 days. She has irritable and decreased sexual drive.  Triggers are stress, lack of sleep and sinus problems.  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  She is concerned about hormone imbalance or if she is having a sinus problem.    Was clenching teeth so got a nightguard and last year had sinus surgery, but they have not helped. She went to the allergist and was given Qvar to use nasally, which helped for about 2 months.    She states that every 2 months she has intense cluster of migraines that she describes as debilitating.

## 2020-09-02 NOTE — PROGRESS NOTES
Subjective:   Marcela Tiwari is a 44 y.o. female here today for migraines    Migraines  Has been having migraines 10 days which has required sumatriptan nightly for 10 days. She has irritable and decreased sexual drive.  Triggers are stress, lack of sleep and sinus problems.  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  She is concerned about hormone imbalance or if she is having a sinus problem.    Was clenching teeth so got a nightguard and last year had sinus surgery, but they have not helped. She went to the allergist and was given Qvar to use nasally, which helped for about 2 months.    She states that every 2 months she has intense cluster of migraines that she describes as debilitating.       Patient is experiencing increased stress recently.  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  She also has a 2-year-old child at home, which does require additional time and stress in the morning to get everyone ready.  She is noticing that she is more irritable.    Current medicines (including changes today)  Current Outpatient Medications   Medication Sig Dispense Refill   • beclomethasone HFA (QVAR REDIHALER) 80 MCG/ACT inhaler Inhale 1 Puff by mouth 2 times a day.     • escitalopram (LEXAPRO) 10 MG Tab Take 1 Tab by mouth every day. 30 Tab 5   • zinc oxide (DESITIN) 40 % Ointment 1 application three times a day for skin burn on right hand and right face. 1 Tube 3   • sumatriptan (IMITREX) 100 MG tablet TAKE 1 TABLET BY MOUTH ONCE DAILY WITH FLUIDS AS EARLY AS POSSIBLE AFTER ONSET OF MIGRAINE ATTACK MAY REPEAT AFTER 2 HOURS IF HEADACHE RETUR     • sumatriptan (IMITREX) 20 MG/ACT nasal spray USE 1 SPRAY(S) ONCE DAILY IF HEADACHE RETURNS DOSE MAY BE REPEATED ONCE AFTER 2 HOURS NOT TO EXCEED 40 MG PER DAY FOR 30 DAYS     • amoxicillin-clavulanate (AUGMENTIN) 875-125 MG Tab  "Take 1 Tab by mouth.     • SUMAtriptan (IMITREX) 50 MG Tab Take 1 Tab by mouth 1 time daily as needed for Migraine. 10 Tab 3   • gabapentin (NEURONTIN) 300 MG Cap Take 1 Cap by mouth 3 times a day. as needed for nerve pain (Patient not taking: Reported on 3/5/2020) 30 Cap 1   • cetirizine (ZYRTEC) 10 MG Tab Take 10 mg by mouth every day.       No current facility-administered medications for this visit.      She  has a past medical history of Cancer (HCC) (2006) and Headache, classical migraine.    ROS   Positive stress, no fever       Objective:     /70 (BP Location: Right arm, Patient Position: Sitting)   Pulse 86   Temp 36.2 °C (97.2 °F) (Temporal)   Ht 1.778 m (5' 10\")   Wt 89.8 kg (198 lb)   SpO2 97%  Body mass index is 28.41 kg/m².   Physical Exam:  Constitutional: Alert, no distress.  Skin: Warm, dry, good turgor, no rashes in visible areas.  Eye: Equal, round and reactive, conjunctiva clear, lids normal.  Psych: Alert and oriented x3, normal affect and mood.        Assessment and Plan:   The following treatment plan was discussed    1. Migraine with aura and without status migrainosus, not intractable  Check CT scan of sinuses see if she still has pansinusitis that could be contributing to migraines.  Start patient on Lexapro to reduce stress.  Referral to neurology for evaluation as she has frequent intense clusters of migraines.  - escitalopram (LEXAPRO) 10 MG Tab; Take 1 Tab by mouth every day.  Dispense: 30 Tab; Refill: 5  - REFERRAL TO NEUROLOGY    2. Sinus headache  CT scan ordered to evaluate if she still has pansinusitis.  Follow-up with allergist tomorrow.  - CT-LOCALIZATION LIMITED SINUSES; Future    3. Stress  Start patient on Lexapro to see if this helps with stress.  Follow-up in 1 month.    4. Annual physical exam  Follow-up with labs in 1 month.  - CBC WITH DIFFERENTIAL; Future  - Comp Metabolic Panel; Future  - Lipid Profile; Future  - TSH WITH REFLEX TO FT4; Future  - VITAMIN D,25 " HYDROXY; Future  - VITAMIN B12; Future        Followup: Return in about 4 weeks (around 9/30/2020), or if symptoms worsen or fail to improve, for Annual.

## 2020-09-08 ENCOUNTER — HOSPITAL ENCOUNTER (OUTPATIENT)
Dept: RADIOLOGY | Facility: MEDICAL CENTER | Age: 45
End: 2020-09-08
Attending: FAMILY MEDICINE
Payer: COMMERCIAL

## 2020-09-08 DIAGNOSIS — R92.30 DENSE BREAST TISSUE ON MAMMOGRAM: ICD-10-CM

## 2020-09-08 PROCEDURE — 76641 ULTRASOUND BREAST COMPLETE: CPT

## 2020-09-15 ENCOUNTER — HOSPITAL ENCOUNTER (OUTPATIENT)
Dept: RADIOLOGY | Facility: MEDICAL CENTER | Age: 45
End: 2020-09-15
Attending: FAMILY MEDICINE
Payer: COMMERCIAL

## 2020-09-15 DIAGNOSIS — R51.9 SINUS HEADACHE: ICD-10-CM

## 2020-09-15 PROCEDURE — 70486 CT MAXILLOFACIAL W/O DYE: CPT

## 2020-09-24 ENCOUNTER — HOSPITAL ENCOUNTER (OUTPATIENT)
Dept: LAB | Facility: MEDICAL CENTER | Age: 45
End: 2020-09-24
Attending: FAMILY MEDICINE
Payer: COMMERCIAL

## 2020-09-24 LAB
COVID ORDER STATUS COVID19: NORMAL
SARS-COV-2 RNA RESP QL NAA+PROBE: NOTDETECTED
SPECIMEN SOURCE: NORMAL

## 2020-09-24 PROCEDURE — C9803 HOPD COVID-19 SPEC COLLECT: HCPCS

## 2020-09-24 PROCEDURE — U0003 INFECTIOUS AGENT DETECTION BY NUCLEIC ACID (DNA OR RNA); SEVERE ACUTE RESPIRATORY SYNDROME CORONAVIRUS 2 (SARS-COV-2) (CORONAVIRUS DISEASE [COVID-19]), AMPLIFIED PROBE TECHNIQUE, MAKING USE OF HIGH THROUGHPUT TECHNOLOGIES AS DESCRIBED BY CMS-2020-01-R: HCPCS

## 2020-09-28 ENCOUNTER — HOSPITAL ENCOUNTER (OUTPATIENT)
Dept: LAB | Facility: MEDICAL CENTER | Age: 45
End: 2020-09-28
Attending: FAMILY MEDICINE
Payer: COMMERCIAL

## 2020-09-28 ENCOUNTER — HOSPITAL ENCOUNTER (OUTPATIENT)
Dept: LAB | Facility: MEDICAL CENTER | Age: 45
End: 2020-09-28
Attending: OTOLARYNGOLOGY
Payer: COMMERCIAL

## 2020-09-28 DIAGNOSIS — Z00.00 ANNUAL PHYSICAL EXAM: ICD-10-CM

## 2020-09-28 LAB
25(OH)D3 SERPL-MCNC: 32 NG/ML (ref 30–100)
ALBUMIN SERPL BCP-MCNC: 4.5 G/DL (ref 3.2–4.9)
ALBUMIN/GLOB SERPL: 1.8 G/DL
ALP SERPL-CCNC: 63 U/L (ref 30–99)
ALT SERPL-CCNC: 19 U/L (ref 2–50)
ANION GAP SERPL CALC-SCNC: 11 MMOL/L (ref 7–16)
AST SERPL-CCNC: 11 U/L (ref 12–45)
BASOPHILS # BLD AUTO: 0.5 % (ref 0–1.8)
BASOPHILS # BLD AUTO: 0.6 % (ref 0–1.8)
BASOPHILS # BLD: 0.03 K/UL (ref 0–0.12)
BASOPHILS # BLD: 0.04 K/UL (ref 0–0.12)
BILIRUB SERPL-MCNC: 0.3 MG/DL (ref 0.1–1.5)
BUN SERPL-MCNC: 20 MG/DL (ref 8–22)
CALCIUM SERPL-MCNC: 9.2 MG/DL (ref 8.5–10.5)
CHLORIDE SERPL-SCNC: 101 MMOL/L (ref 96–112)
CHOLEST SERPL-MCNC: 164 MG/DL (ref 100–199)
CO2 SERPL-SCNC: 25 MMOL/L (ref 20–33)
CREAT SERPL-MCNC: 0.67 MG/DL (ref 0.5–1.4)
EOSINOPHIL # BLD AUTO: 0.27 K/UL (ref 0–0.51)
EOSINOPHIL # BLD AUTO: 0.29 K/UL (ref 0–0.51)
EOSINOPHIL NFR BLD: 4.1 % (ref 0–6.9)
EOSINOPHIL NFR BLD: 4.4 % (ref 0–6.9)
ERYTHROCYTE [DISTWIDTH] IN BLOOD BY AUTOMATED COUNT: 43.1 FL (ref 35.9–50)
ERYTHROCYTE [DISTWIDTH] IN BLOOD BY AUTOMATED COUNT: 43.6 FL (ref 35.9–50)
FASTING STATUS PATIENT QL REPORTED: NORMAL
GLOBULIN SER CALC-MCNC: 2.5 G/DL (ref 1.9–3.5)
GLUCOSE SERPL-MCNC: 93 MG/DL (ref 65–99)
HCT VFR BLD AUTO: 40.6 % (ref 37–47)
HCT VFR BLD AUTO: 40.9 % (ref 37–47)
HDLC SERPL-MCNC: 68 MG/DL
HGB BLD-MCNC: 13.4 G/DL (ref 12–16)
HGB BLD-MCNC: 13.6 G/DL (ref 12–16)
IMM GRANULOCYTES # BLD AUTO: 0.01 K/UL (ref 0–0.11)
IMM GRANULOCYTES # BLD AUTO: 0.01 K/UL (ref 0–0.11)
IMM GRANULOCYTES NFR BLD AUTO: 0.2 % (ref 0–0.9)
IMM GRANULOCYTES NFR BLD AUTO: 0.2 % (ref 0–0.9)
LDLC SERPL CALC-MCNC: 84 MG/DL
LYMPHOCYTES # BLD AUTO: 1.68 K/UL (ref 1–4.8)
LYMPHOCYTES # BLD AUTO: 1.73 K/UL (ref 1–4.8)
LYMPHOCYTES NFR BLD: 25.4 % (ref 22–41)
LYMPHOCYTES NFR BLD: 26.1 % (ref 22–41)
MCH RBC QN AUTO: 30.8 PG (ref 27–33)
MCH RBC QN AUTO: 31.3 PG (ref 27–33)
MCHC RBC AUTO-ENTMCNC: 33 G/DL (ref 33.6–35)
MCHC RBC AUTO-ENTMCNC: 33.3 G/DL (ref 33.6–35)
MCV RBC AUTO: 93.3 FL (ref 81.4–97.8)
MCV RBC AUTO: 94.2 FL (ref 81.4–97.8)
MONOCYTES # BLD AUTO: 0.39 K/UL (ref 0–0.85)
MONOCYTES # BLD AUTO: 0.4 K/UL (ref 0–0.85)
MONOCYTES NFR BLD AUTO: 5.9 % (ref 0–13.4)
MONOCYTES NFR BLD AUTO: 6 % (ref 0–13.4)
NEUTROPHILS # BLD AUTO: 4.17 K/UL (ref 2–7.15)
NEUTROPHILS # BLD AUTO: 4.21 K/UL (ref 2–7.15)
NEUTROPHILS NFR BLD: 63 % (ref 44–72)
NEUTROPHILS NFR BLD: 63.6 % (ref 44–72)
NRBC # BLD AUTO: 0 K/UL
NRBC # BLD AUTO: 0 K/UL
NRBC BLD-RTO: 0 /100 WBC
NRBC BLD-RTO: 0 /100 WBC
PLATELET # BLD AUTO: 262 K/UL (ref 164–446)
PLATELET # BLD AUTO: 268 K/UL (ref 164–446)
PMV BLD AUTO: 10.1 FL (ref 9–12.9)
PMV BLD AUTO: 10.3 FL (ref 9–12.9)
POTASSIUM SERPL-SCNC: 3.7 MMOL/L (ref 3.6–5.5)
PROT SERPL-MCNC: 7 G/DL (ref 6–8.2)
RBC # BLD AUTO: 4.34 M/UL (ref 4.2–5.4)
RBC # BLD AUTO: 4.35 M/UL (ref 4.2–5.4)
SODIUM SERPL-SCNC: 137 MMOL/L (ref 135–145)
TRIGL SERPL-MCNC: 58 MG/DL (ref 0–149)
TSH SERPL DL<=0.005 MIU/L-ACNC: 1.24 UIU/ML (ref 0.38–5.33)
VIT B12 SERPL-MCNC: 573 PG/ML (ref 211–911)
WBC # BLD AUTO: 6.6 K/UL (ref 4.8–10.8)
WBC # BLD AUTO: 6.6 K/UL (ref 4.8–10.8)

## 2020-09-28 PROCEDURE — 36415 COLL VENOUS BLD VENIPUNCTURE: CPT

## 2020-09-28 PROCEDURE — 85025 COMPLETE CBC W/AUTO DIFF WBC: CPT

## 2020-09-28 PROCEDURE — 82784 ASSAY IGA/IGD/IGG/IGM EACH: CPT

## 2020-09-28 PROCEDURE — 80061 LIPID PANEL: CPT

## 2020-09-28 PROCEDURE — 82306 VITAMIN D 25 HYDROXY: CPT

## 2020-09-28 PROCEDURE — 84443 ASSAY THYROID STIM HORMONE: CPT

## 2020-09-28 PROCEDURE — 82787 IGG 1 2 3 OR 4 EACH: CPT | Mod: 91

## 2020-09-28 PROCEDURE — 85025 COMPLETE CBC W/AUTO DIFF WBC: CPT | Mod: 91

## 2020-09-28 PROCEDURE — 82785 ASSAY OF IGE: CPT

## 2020-09-28 PROCEDURE — 80053 COMPREHEN METABOLIC PANEL: CPT

## 2020-09-28 PROCEDURE — 86317 IMMUNOASSAY INFECTIOUS AGENT: CPT | Mod: 91

## 2020-09-28 PROCEDURE — 82607 VITAMIN B-12: CPT

## 2020-09-30 LAB
C DIPHTHERIAE IGG SER-ACNC: 0.1 IU/ML
HAEM INFLU B IGG SER-MCNC: 0 UG/ML

## 2020-10-01 ENCOUNTER — OFFICE VISIT (OUTPATIENT)
Dept: NEUROLOGY | Facility: MEDICAL CENTER | Age: 45
End: 2020-10-01
Payer: COMMERCIAL

## 2020-10-01 VITALS
SYSTOLIC BLOOD PRESSURE: 98 MMHG | BODY MASS INDEX: 27.87 KG/M2 | WEIGHT: 194.67 LBS | TEMPERATURE: 98.2 F | OXYGEN SATURATION: 95 % | DIASTOLIC BLOOD PRESSURE: 52 MMHG | HEIGHT: 70 IN | HEART RATE: 75 BPM | RESPIRATION RATE: 14 BRPM

## 2020-10-01 DIAGNOSIS — N95.1 PERIMENOPAUSAL: ICD-10-CM

## 2020-10-01 DIAGNOSIS — Z85.830 HX OF EWING'S SARCOMA: ICD-10-CM

## 2020-10-01 LAB
IGA SERPL-MCNC: 249 MG/DL (ref 68–408)
IGE SERPL-ACNC: 18 KU/L
IGG SERPL-MCNC: 1070 MG/DL (ref 768–1632)
IGG1 SER-MCNC: 496 MG/DL (ref 240–1118)
IGG2 SER-MCNC: 546 MG/DL (ref 124–549)
IGG3 SER-MCNC: 72 MG/DL (ref 21–134)
IGG4 SER-MCNC: 116 MG/DL (ref 1–123)
IGM SERPL-MCNC: 120 MG/DL (ref 35–263)
S PN DA SERO 19F IGG SER-MCNC: 5.7 UG/ML
S PNEUM DA 1 IGG SER-MCNC: 0.8 UG/ML
S PNEUM DA 12F IGG SER-MCNC: 0.5 UG/ML
S PNEUM DA 14 IGG SER-MCNC: 1.19 UG/ML
S PNEUM DA 18C IGG SER-MCNC: 5.33 UG/ML
S PNEUM DA 23F IGG SER-MCNC: 0.48 UG/ML
S PNEUM DA 3 IGG SER-MCNC: 0.84 UG/ML
S PNEUM DA 4 IGG SER-MCNC: 0.15 UG/ML
S PNEUM DA 5 IGG SER-MCNC: 8.75 UG/ML
S PNEUM DA 6B IGG SER-MCNC: 0.61 UG/ML
S PNEUM DA 7F IGG SER-MCNC: 1.21 UG/ML
S PNEUM DA 8 IGG SER-MCNC: 0.26 UG/ML
S PNEUM DA 9N IGG SER-MCNC: 0.67 UG/ML
S PNEUM DA 9V IGG SER-MCNC: 0.2 UG/ML
S PNEUM SEROTYPE IGG SER-IMP: NORMAL

## 2020-10-01 PROCEDURE — 99204 OFFICE O/P NEW MOD 45 MIN: CPT | Performed by: NURSE PRACTITIONER

## 2020-10-01 RX ORDER — AMITRIPTYLINE HYDROCHLORIDE 10 MG/1
TABLET, FILM COATED ORAL
Qty: 150 TAB | Refills: 2 | Status: SHIPPED
Start: 2020-10-01 | End: 2021-01-20

## 2020-10-01 ASSESSMENT — FIBROSIS 4 INDEX: FIB4 SCORE: 0.4

## 2020-10-01 NOTE — PROGRESS NOTES
Subjective:      Marcela Tiwari is a 44 y.o. female who presents with New Patient (Migraine with aura and without status migrainosus, not intractable patient has had migraines for 10 years 10-15  migraines per month . )          HPI   Treated for cancer with chemotherapy for 13 months. 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     unilateral amputation   oral medication for chemotherapy began.    With moving to this region 6 years ago, her migraines became much worse.    2020 Maxilofacial CT IMPRESSION:  1.  No acute intracranial abnormality.  2.  Pansinusitis.    Underwent surgery for pansinusitis clean out.  Per ENT her sinuses are clear.  CT-sinus was normal just last week.    Evaluated per allergist and she has no recognized allergies.    Hormones:  5 years of menopause during chemotherapy.  Conceived daughter and was unawares that she was pregnant until 17 weeks.  Irregular with menses since delivery of child in 2017.    Random onset, often by mid-day.  Last month she had 10 days of migraine in a row.  Triggers: stress, red wine, sleep deprivation, teeth clenching, skipping meals    Typical headache: Left facial weakness. Pain is severe.  Debilitating, photophobia, phonophobia.    Abortive treatment: Excedrin 1st followed by sumatriptan 50mg    Family history: possibly father with migraine  Father  at 55 with brain aneurysm    Professor at the [employer].  Lives in [place], [place] with  and 3 year old daughter.    Started on Lexapro 3-4 weeks ago.  Does not feel as tense with taking this, no apparent negative side effects.    Current Outpatient Medications   Medication Sig Dispense Refill   • SUMAtriptan (IMITREX) 50 MG Tab TAKE 1 TABLET BY MOUTH ONCE DAILY AS NEEDED FOR MIGRAINE HEADACHE 10 Tab 11   • beclomethasone HFA (QVAR REDIHALER) 80 MCG/ACT inhaler Inhale 1 Puff by mouth 2 times a  "day.     • escitalopram (LEXAPRO) 10 MG Tab Take 1 Tab by mouth every day. 30 Tab 5   • sumatriptan (IMITREX) 20 MG/ACT nasal spray USE 1 SPRAY(S) ONCE DAILY IF HEADACHE RETURNS DOSE MAY BE REPEATED ONCE AFTER 2 HOURS NOT TO EXCEED 40 MG PER DAY FOR 30 DAYS     • zinc oxide (DESITIN) 40 % Ointment 1 application three times a day for skin burn on right hand and right face. 1 Tube 3   • sumatriptan (IMITREX) 100 MG tablet TAKE 1 TABLET BY MOUTH ONCE DAILY WITH FLUIDS AS EARLY AS POSSIBLE AFTER ONSET OF MIGRAINE ATTACK MAY REPEAT AFTER 2 HOURS IF HEADACHE RETUR     • amoxicillin-clavulanate (AUGMENTIN) 875-125 MG Tab Take 1 Tab by mouth.     • gabapentin (NEURONTIN) 300 MG Cap Take 1 Cap by mouth 3 times a day. as needed for nerve pain (Patient not taking: Reported on 3/5/2020) 30 Cap 1   • cetirizine (ZYRTEC) 10 MG Tab Take 10 mg by mouth every day.       No current facility-administered medications for this visit.           Objective:     BP (!) 98/52 (BP Location: Left arm)   Pulse 75   Temp 36.8 °C (98.2 °F) (Temporal)   Resp 14   Ht 1.778 m (5' 10\")   Wt 88.3 kg (194 lb 10.7 oz)   SpO2 95%   BMI 27.93 kg/m²      Physical Exam  Constitutional:       General: She is not in acute distress.  HENT:      Head: Normocephalic and atraumatic.      Nose: Nose normal.   Eyes:      Pupils: Pupils are equal, round, and reactive to light.   Cardiovascular:      Rate and Rhythm: Normal rate and regular rhythm.      Heart sounds: No murmur. No friction rub. No gallop.    Pulmonary:      Effort: Pulmonary effort is normal. No respiratory distress.      Breath sounds: Normal breath sounds.   Lymphadenopathy:      Cervical: No cervical adenopathy.   Skin:     General: Skin is warm and dry.   Neurological:      Mental Status: She is alert and oriented to person, place, and time.      Comments:   CN II: Fundi normal, visual fields full to confrontation.  CN III, IV, VI: Pupils equal, round, and reactive to light.  Extraocular " movements full and intact in horizontal and vertical gaze.  CN V: Normal in motor and sensory modalities.  CN VII: No evidence of facial asymmetry.  CN VIII: Hearing grossly intact.  CN IX, X: Palate elevates symmetrically and in the midline.  CN XI: Normal sternocleidomastoid strength.  CN XII: Tongue is in the midline.    Motor: Normal muscle bulk and tone, with full and symmetric strength.  Sensory: Intact to light touch, pinprick, vibration, proprioception, and graphesthesia.  DTR's: 2+ throughout with flexor plantar responses.  Cerebellar/Coordination: Normal finger to finger, finger-tapping, rapid alternating movements, and foot tapping.  Gait: Normal casual gait.  Walks well on heels and toes, as well as in tandem gait.   Psychiatric:      Comments: Pleasant, very talkative             Assessment/Plan:          Migraine headaches:  Long-standing history of migraines exacerbated 6 years ago when she moved to this region.  Occuring 20+ days per month with onset by mid-day.  Unrelieved by OTC medications.    Neurological examination is normal and non-focal.    Discussed migraines and migraine management.  Discussed importance of eating routinely, staying well hydrated, a consistent sleep routine and exercise.    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    Begin titration of amitriptyline 10mg tablets.      Bedtime  1 tablet X 1 week  2 tablets X 1 week  3 tablets X 1 week  4 tablets X 1 week  5 tablets thereafter    Okay to hold at lower dose if headaches are much improved.  Consider reducing Lexapro if amitriptyline is well tolerated.    Will continue to use sumatriptan 50mg-100mg tablets and nasal spray.    Consider CGRP monthly injection for preventative care.  Consider adding magnesium daily.    Return for follow-up in 2-3 months or sooner if needed to evaluate effectiveness of new start amitriptyline.

## 2020-10-02 ENCOUNTER — OFFICE VISIT (OUTPATIENT)
Dept: MEDICAL GROUP | Facility: MEDICAL CENTER | Age: 45
End: 2020-10-02
Payer: COMMERCIAL

## 2020-10-02 VITALS
WEIGHT: 194 LBS | DIASTOLIC BLOOD PRESSURE: 60 MMHG | HEIGHT: 70 IN | HEART RATE: 77 BPM | OXYGEN SATURATION: 94 % | SYSTOLIC BLOOD PRESSURE: 104 MMHG | BODY MASS INDEX: 27.77 KG/M2 | TEMPERATURE: 98.5 F

## 2020-10-02 DIAGNOSIS — Z00.00 ANNUAL PHYSICAL EXAM: ICD-10-CM

## 2020-10-02 DIAGNOSIS — G43.109 MIGRAINE WITH AURA AND WITHOUT STATUS MIGRAINOSUS, NOT INTRACTABLE: ICD-10-CM

## 2020-10-02 DIAGNOSIS — Z23 NEED FOR VACCINATION: ICD-10-CM

## 2020-10-02 DIAGNOSIS — E55.9 VITAMIN D DEFICIENCY: ICD-10-CM

## 2020-10-02 PROBLEM — R51.9 HEADACHE: Status: RESOLVED | Noted: 2018-06-04 | Resolved: 2020-10-02

## 2020-10-02 PROCEDURE — 99396 PREV VISIT EST AGE 40-64: CPT | Mod: 25 | Performed by: FAMILY MEDICINE

## 2020-10-02 PROCEDURE — 90471 IMMUNIZATION ADMIN: CPT | Performed by: FAMILY MEDICINE

## 2020-10-02 PROCEDURE — 90686 IIV4 VACC NO PRSV 0.5 ML IM: CPT | Performed by: FAMILY MEDICINE

## 2020-10-02 ASSESSMENT — FIBROSIS 4 INDEX: FIB4 SCORE: 0.4

## 2020-10-02 NOTE — PROGRESS NOTES
"Subjective:   Marcela Tiwari is a 44 y.o. female here today for annual    Patient is doing well with Lexapro 10 mg daily.  She has been tolerating it for the last 1 month without significant side effects.  Her irritability and stress levels have improved.    Patient was seen by neurologist yesterday because of chronic and frequent migraines.  She was started on amitriptyline and advised to increase by 10 mg every week.  Patient hopes that she will get to 30 mg daily and the migraines will be controlled.  Once she gets to 30 mg the plan was to wean off of Lexapro.    Patient was drinking 2 glasses of wine per night, which she has discontinued.  Since discontinued she has lost 6 pounds.    Current medicines (including changes today)  Current Outpatient Medications   Medication Sig Dispense Refill   • amitriptyline (ELAVIL) 10 MG Tab Amitriptyline 10mg tablets      Bedtime  1 tablet X 1-2 week  2 tablets X 1-2 week  3 tablets X 1-2 week  4 tablets X 1-2 week  5 tablets thereafter    Okay to hold at lower dose if headaches are much improved. 150 Tab 2   • SUMAtriptan (IMITREX) 50 MG Tab TAKE 1 TABLET BY MOUTH ONCE DAILY AS NEEDED FOR MIGRAINE HEADACHE 10 Tab 11   • beclomethasone HFA (QVAR REDIHALER) 80 MCG/ACT inhaler Inhale 1 Puff by mouth 2 times a day.     • escitalopram (LEXAPRO) 10 MG Tab Take 1 Tab by mouth every day. 30 Tab 5   • sumatriptan (IMITREX) 20 MG/ACT nasal spray USE 1 SPRAY(S) ONCE DAILY IF HEADACHE RETURNS DOSE MAY BE REPEATED ONCE AFTER 2 HOURS NOT TO EXCEED 40 MG PER DAY FOR 30 DAYS       No current facility-administered medications for this visit.      She  has a past medical history of Cancer (HCC) (2006) and Headache, classical migraine.    ROS   No chest pain, no shortness of breath, no abdominal pain       Objective:     /60 (BP Location: Right arm, Patient Position: Sitting, BP Cuff Size: Adult)   Pulse 77   Temp 36.9 °C (98.5 °F) (Temporal)   Ht 1.778 m (5' 10\")   Wt 88 " kg (194 lb)   SpO2 94%  Body mass index is 27.84 kg/m².   Physical Exam:  Constitutional: Alert, no distress.  Skin: Warm, dry, good turgor, no rashes in visible areas.  Eye: Equal, round and reactive, conjunctiva clear, lids normal.  ENMT: TMs pearly gray bilaterally.  Neck: Trachea midline, no masses, no thyromegaly. No cervical or supraclavicular lymphadenopathy  Respiratory: Unlabored respiratory effort, lungs clear to auscultation, no wheezes, no ronchi.  Cardiovascular: Normal S1, S2, no murmur, no edema.  Psych: Alert and oriented x3, normal affect and mood.        Assessment and Plan:   The following treatment plan was discussed    1. Annual physical exam  Reviewed lab work with patient.  Encouraged healthy lifestyle.  Follow-up labs annually.    2. Migraine with aura and without status migrainosus, not intractable  I agree with amitriptyline for migraine prevention and weaning Lexapro when she gets to 30 mg.  We will have to watch irritability and stress.  If irritability and stress return once she weans off Lexapro we may want to restart Lexapro at a very low dose, 5 mg daily.    3. Vitamin D deficiency  Controlled.  Continue vitamin D supplementation.    4. Need for vaccination  - Influenza Vaccine Quad Injection (PF)      Followup: Return in about 1 year (around 10/2/2021) for Annual.

## 2020-10-07 ENCOUNTER — PATIENT MESSAGE (OUTPATIENT)
Dept: NEUROLOGY | Facility: MEDICAL CENTER | Age: 45
End: 2020-10-07

## 2020-10-07 NOTE — TELEPHONE ENCOUNTER
----- Message from Marcela Tiwari sent at 10/7/2020  7:42 AM PDT -----  Regarding: Prescription Question  Contact: [phone]  Good morning, Ngoc. I have been taking the medication that you prescribed for last 5 evenings. I do not like how it makes me feel. I noticed it right away but thought I would give a chance. I am very foggy and groggy in the morning and I absolutely hate this feeling. 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 Last two nights I had migraines(yesterday was a massive one) , I had to take sumatriptan. I also noticed that everything that we achieved with Lexapro during last one months of taking it, like feeling calmer less agitated and annoyed, and overwhelmed. I feel all of this again since I started this medication. Please.

## 2020-10-07 NOTE — PATIENT COMMUNICATION
"Patient sent via [employer]     \"Good morning, Ngoc. I have been taking the medication that you prescribed for last 5 evenings. I do not like how it makes me feel. I noticed it right away but thought I would give a chance. I am very foggy and groggy in the morning and I absolutely hate this feeling. 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 Last two nights I had migraines(yesterday was a massive one) , I had to take sumatriptan. I also noticed that everything that we achieved with Lexapro during last one months of taking it, like feeling calmer less agitated and annoyed, and overwhelmed. I feel all of this again since I started this medication. Please.\"      "

## 2020-10-12 ENCOUNTER — TELEPHONE (OUTPATIENT)
Dept: NEUROLOGY | Facility: MEDICAL CENTER | Age: 45
End: 2020-10-12

## 2020-10-12 ENCOUNTER — PATIENT MESSAGE (OUTPATIENT)
Dept: NEUROLOGY | Facility: MEDICAL CENTER | Age: 45
End: 2020-10-12

## 2020-10-12 NOTE — TELEPHONE ENCOUNTER
Please let Marcela know that I agree with the advice Dr Armstrong provided her with tapering off of the amitriptyline.      How is taking the amitriptyline and Lexapro currently?  Hopefully she has stopped the amitriptyline.    How is she feeling?

## 2020-10-12 NOTE — TELEPHONE ENCOUNTER
"----- Message from Marcela Tiwari sent at 10/12/2020  7:51 AM PDT -----  Regarding: Prescription Question  Contact: [phone]  Cruz. Thank you for providing the reply from Miranda. I was so not feeling well from this medication \"amitriptyline\" that I made a personal assessment to stop it without tapering it off since I was only using it for 5 evenings and 10 mg only. It went well.   I am still on Lexapro 10 mg every morning for the last month. It seems to work well for me.   So, I was wondering if I need to meet with Miranda or discuss it via email what the next try will be to prevent the migraines. Perhaps, monthly shot ? Side affects to consider is important to me. My past cancer experience is important to take in the consideration when Miranda will make a decision what will be the best for me to try. Thank you for your time. Marcela"

## 2020-10-12 NOTE — PATIENT COMMUNICATION
"Message sent via [employer]     \"Cruz. Thank you for providing the reply from Miranda. I was so not feeling well from this medication \"amitriptyline\" that I made a personal assessment to stop it without tapering it off since I was only using it for 5 evenings and 10 mg only. It went well.   I am still on Lexapro 10 mg every morning for the last month. It seems to work well for me.   So, I was wondering if I need to meet with Miranda or discuss it via email what the next try will be to prevent the migraines. Perhaps, monthly shot ? Side affects to consider is important to me. My past cancer experience is important to take in the consideration when Miranda will make a decision what will be the best for me to try. Thank you for your time. Marcela\"  "

## 2020-11-17 ENCOUNTER — PATIENT MESSAGE (OUTPATIENT)
Dept: MEDICAL GROUP | Facility: MEDICAL CENTER | Age: 45
End: 2020-11-17

## 2020-11-17 DIAGNOSIS — Z11.59 ENCOUNTER FOR SCREENING FOR OTHER VIRAL DISEASES: ICD-10-CM

## 2020-11-18 ENCOUNTER — HOSPITAL ENCOUNTER (OUTPATIENT)
Dept: LAB | Facility: MEDICAL CENTER | Age: 45
End: 2020-11-18
Attending: FAMILY MEDICINE
Payer: COMMERCIAL

## 2020-11-18 PROCEDURE — U0003 INFECTIOUS AGENT DETECTION BY NUCLEIC ACID (DNA OR RNA); SEVERE ACUTE RESPIRATORY SYNDROME CORONAVIRUS 2 (SARS-COV-2) (CORONAVIRUS DISEASE [COVID-19]), AMPLIFIED PROBE TECHNIQUE, MAKING USE OF HIGH THROUGHPUT TECHNOLOGIES AS DESCRIBED BY CMS-2020-01-R: HCPCS

## 2020-11-18 PROCEDURE — C9803 HOPD COVID-19 SPEC COLLECT: HCPCS

## 2020-11-18 NOTE — TELEPHONE ENCOUNTER
From: Marcela Tiwari  To: Ivan Armstrong M.D.  Sent: 11/17/2020 8:56 PM PST  Subject: Procedure Question    Dr. Armstrong , my  tested positive for covid. We just got results.   May I please get an order to go get tested to see tomorrow , Wed 11/ 18. Please , let me know . Marcela

## 2020-11-22 ENCOUNTER — PATIENT MESSAGE (OUTPATIENT)
Dept: MEDICAL GROUP | Facility: MEDICAL CENTER | Age: 45
End: 2020-11-22

## 2020-11-22 DIAGNOSIS — Z11.59 ENCOUNTER FOR SCREENING FOR OTHER VIRAL DISEASES: ICD-10-CM

## 2020-11-23 ENCOUNTER — HOSPITAL ENCOUNTER (OUTPATIENT)
Dept: LAB | Facility: MEDICAL CENTER | Age: 45
End: 2020-11-23
Attending: FAMILY MEDICINE
Payer: COMMERCIAL

## 2020-11-23 DIAGNOSIS — Z11.59 ENCOUNTER FOR SCREENING FOR OTHER VIRAL DISEASES: ICD-10-CM

## 2020-11-23 LAB — COVID ORDER STATUS COVID19: NORMAL

## 2020-11-23 PROCEDURE — C9803 HOPD COVID-19 SPEC COLLECT: HCPCS

## 2020-11-23 PROCEDURE — U0003 INFECTIOUS AGENT DETECTION BY NUCLEIC ACID (DNA OR RNA); SEVERE ACUTE RESPIRATORY SYNDROME CORONAVIRUS 2 (SARS-COV-2) (CORONAVIRUS DISEASE [COVID-19]), AMPLIFIED PROBE TECHNIQUE, MAKING USE OF HIGH THROUGHPUT TECHNOLOGIES AS DESCRIBED BY CMS-2020-01-R: HCPCS

## 2020-11-23 NOTE — TELEPHONE ENCOUNTER
From: Marcela Tiwari  To: Ivan Armstrong M.D.  Sent: 11/22/2020 8:49 PM PST  Subject: Procedure Question    Dr. Armstrong,   I was wondering if you may please order re testing for covid for me. Even if I tested negative on 11/18 , I am still feeling off, such as fatigue, chest pain and dry cough. I have an order to test our daughter Lorna as well ( although Dr. Lyn Beard said there is no need for it ). I was thinking both Lorna and I can get tested tomorrow.   It just would be nice to know if we have /had it. So it can be on our records for any future complications, hopefully no complications. What do you think ? Thank you for your time. Marcela   My  Eugenio Bang is feeling better, he hasn't had a fever since 11/19. We are all sitting at home.      ----- Message -----   From:Ivan Armstrong M.D.   Sent:11/18/2020 7:19 AM PST   To:Marcela Tiwari   Subject:RE: Procedure Question    Marcela,    I have ordered a COVID-19 nasal swab. You can use the drive thru on the other side of our [facility]. Just let them know I placed the order in the [employer] system.  Results take 24-48 to come back. Quarantine until you receive your results.    Dr. Armstrong      ----- Message -----   From:Marcela Tiwari   Sent:11/17/2020 8:56 PM PST   To:Ivan Armstrong M.D.   Subject:Procedure Question    Dr. Armstrong , my  tested positive for covid. We just got results.   May I please get an order to go get tested to see tomorrow , Wed 11/ 18. Please , let me know . Marcela

## 2020-11-24 LAB
SARS-COV-2 RNA RESP QL NAA+PROBE: DETECTED
SPECIMEN SOURCE: ABNORMAL

## 2021-01-20 ENCOUNTER — OFFICE VISIT (OUTPATIENT)
Dept: MEDICAL GROUP | Facility: IMAGING CENTER | Age: 46
End: 2021-01-20
Payer: COMMERCIAL

## 2021-01-20 VITALS
RESPIRATION RATE: 12 BRPM | HEART RATE: 72 BPM | HEIGHT: 70 IN | OXYGEN SATURATION: 97 % | DIASTOLIC BLOOD PRESSURE: 62 MMHG | BODY MASS INDEX: 27.92 KG/M2 | WEIGHT: 195 LBS | SYSTOLIC BLOOD PRESSURE: 112 MMHG | TEMPERATURE: 98 F

## 2021-01-20 DIAGNOSIS — Z89.519 S/P BKA (BELOW KNEE AMPUTATION) UNILATERAL (HCC): ICD-10-CM

## 2021-01-20 DIAGNOSIS — G43.109 MIGRAINE WITH AURA AND WITHOUT STATUS MIGRAINOSUS, NOT INTRACTABLE: ICD-10-CM

## 2021-01-20 DIAGNOSIS — J31.0 CHRONIC RHINITIS: ICD-10-CM

## 2021-01-20 DIAGNOSIS — Z76.89 ENCOUNTER TO ESTABLISH CARE WITH NEW DOCTOR: ICD-10-CM

## 2021-01-20 PROCEDURE — 99213 OFFICE O/P EST LOW 20 MIN: CPT | Performed by: NURSE PRACTITIONER

## 2021-01-20 RX ORDER — CETIRIZINE HYDROCHLORIDE 10 MG/1
10 TABLET ORAL DAILY
COMMUNITY
End: 2021-08-13

## 2021-01-20 ASSESSMENT — ANXIETY QUESTIONNAIRES
4. TROUBLE RELAXING: NOT AT ALL
1. FEELING NERVOUS, ANXIOUS, OR ON EDGE: NOT AT ALL
7. FEELING AFRAID AS IF SOMETHING AWFUL MIGHT HAPPEN: NOT AT ALL
3. WORRYING TOO MUCH ABOUT DIFFERENT THINGS: NOT AT ALL
2. NOT BEING ABLE TO STOP OR CONTROL WORRYING: NOT AT ALL
5. BEING SO RESTLESS THAT IT IS HARD TO SIT STILL: NOT AT ALL
6. BECOMING EASILY ANNOYED OR IRRITABLE: SEVERAL DAYS
GAD7 TOTAL SCORE: 1

## 2021-01-20 ASSESSMENT — PAIN SCALES - GENERAL: PAINLEVEL: NO PAIN

## 2021-01-20 ASSESSMENT — PATIENT HEALTH QUESTIONNAIRE - PHQ9: CLINICAL INTERPRETATION OF PHQ2 SCORE: 0

## 2021-01-20 ASSESSMENT — FIBROSIS 4 INDEX: FIB4 SCORE: 0.4

## 2021-01-20 NOTE — PATIENT INSTRUCTIONS
No more than 4000 mg Tylenol in 24 hour.  No more than  2400 mg of Ibuprofen in 24 hours  No more than 200 mg of Sumatriptan in 24 hours  Sumatriptan 50 mg, may repeat in 2 hours 50 mg dosage.    Instructed to take 400 mg of Magnesium daily as tolerated. Discussed starting at 100 mg and titrate to 400 mg if tolerated. Discussed starting Riboflavins (B2) 400 mg daily to prevent migraines, verbalized understanding. Instructed to take 200 mg of over the counter CoQ10 every evening.  Discussed the importance of diet and water intake to decrease severity and frequency of migraines. Encouraged to do neck stretches regularly, and to practice mindfulness and meditation to help relieve and stress triggers. Discussed not taking NSAIDs more than 3 days in row due to rebound head potential when stopped. Discussed possible side effects of GI upset and GI bleeding with long-term NSAID use. Instructed to take NSAIDs with food.

## 2021-01-20 NOTE — PROGRESS NOTES
"Subjective:   CC:   Chief Complaint   Patient presents with   • Establish Care   • DME Question     needs prescription-[facility] for prosthetic supplies       HISTORY OF THE PRESENT ILLNESS: Patient is a 45 y.o. female. Her prior PCP was Ivan Armstrong MD, last seen 10/2020.  Patient has history of Ludwig's sarcoma, S/P BKA, migraines, chronic rhinitis, heart palpitations, vitamin D deficiency, COVID-19 in November 2020, and eczema. Patient is here today to establish care and discuss prescription for new prosthetic leg and associated supplies needed for the year.     S/P BKA (below knee amputation) unilateral  Reports she is a patient of [facility]. Reports she is in the process of receiving a new prosthetic leg for her left leg due to BKA in 2011. 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 Reports due to her regular physical activity she is in need of a new left \"by pin\" prosthetic leg. Reports her current prosthetic is broken at greater toe and she is having to use coband around the ankle to hold it together. Reports she needs a new leg about every 3 years because of how active she is. States she has a \"flipper leg\" that works by suction, but it has fallen off in the past and the \"by pin\" is much more secure of a leg for her activity level. Reports she swims, exercises, and hikes regularly. 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 Reports that she is considered a K4 activity level due to her high activity of ambulation. Reports other than sleeping for 7 hours a night she feels she is on her feet all day long. Reports she has a strong desires to use her prosthesis and ability. Reports she is also needing a new leg because she has her back leg skin hanging over her prosthetic that causes intermittent discomfort. Reports she needs certain supplies in addition to her " new leg. Reports every year she needs new socks for her left leg. Reports she needs as many as her insurance will allow. Reports she goes though them about every 2-3 months before they start to breakdown and cause skin irritation. Reports she needs 4 liners a year. States about every 3-4 months she needs a new liner because of them breaking down and when they do this her prosthetic is not as comfortable.    Chronic rhinitis  Reports that she has allergies and has chronic nasal congestion. Reports that she had sinus surgery in 2019 that improved symptoms. Reports taking Zyrtec daily.     Migraines  Reports that she has intermittent migraines. States migraines are relieved by Imitrex 50 mg or nasal spray when she is too nauseated to use oral Imitrex. Denies any preventative supplements. Reports migraines are random and not frequent.    Allergies: Seasonal    Current Outpatient Medications Ordered in Epic   Medication Sig Dispense Refill   • cetirizine (ZYRTEC) 10 MG Tab Take 10 mg by mouth every day.     • SUMAtriptan (IMITREX) 50 MG Tab TAKE 1 TABLET BY MOUTH ONCE DAILY AS NEEDED FOR MIGRAINE HEADACHE 10 Tab 11   • sumatriptan (IMITREX) 20 MG/ACT nasal spray USE 1 SPRAY(S) ONCE DAILY IF HEADACHE RETURNS DOSE MAY BE REPEATED ONCE AFTER 2 HOURS NOT TO EXCEED 40 MG PER DAY FOR 30 DAYS       No current [facility]-ordered facility-administered medications on file.      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   • Headache, classical migraine      Past Surgical History:   Procedure Laterality Date   • OTHER  2019    sinus surgery   • AMPUTATION, BELOW THE KNEE  2011   • BUNIONECTOMY       Social History     Tobacco Use   • Smoking status: Never Smoker   • Smokeless tobacco: Never Used   Substance Use Topics   • Alcohol use: Not Currently     Alcohol/week: 2.0 oz     Types: 4 Glasses of wine per week   • Drug use: No     Social History     Social History Narrative   • Not on file     Family  "History   Problem Relation Age of Onset   • Other Mother 48        Parkinson's   • Arterial Aneurysm Father         brain   • Cancer Paternal Grandmother         breast cancer     Health Maintenance:  Completed.    ROS:   Constitutional: Denies fever, chills, night sweats, weight loss/gain and/or malaise/fatigue.   HENT: Denies sore throat, hearing loss, enlarged thyroid, or difficulty swallowing. Chronic nasal congestion, see HPI.   Eyes: Denies changes in vision, pain. Does wear corrective wear, glasses  Respiratory: Denies cough, SOB at rest or activity.    Cardiovascular: Denies tachycardia, chest pain, palpitations, or  leg swelling.   Gastrointestinal: Denies N/V/C/D, abdominal pain, loss appetite, reflux, or hematochezia.  Genitourinary: Denies difficulty voiding, dysuria, nocturia, or hematuria.   Skin: Negative for rash or worrisome moles.   Neurological: Negative for dizziness, focal weakness. History of migraines, see HPI.  Endo/Heme/Allergies: Denies bruise/bleed easily. Seasonal allergies, see HPI.   Psychiatric/Behavioral: Denies depression, nervous/anxious, difficulty sleeping.  MSK: JANE, see HPI.    Objective:   Exam: /62   Pulse 72   Temp 36.7 °C (98 °F)   Resp 12   Ht 1.778 m (5' 10\")   Wt 88.5 kg (195 lb)   SpO2 97%  Body mass index is 27.98 kg/m².    General: Normal appearing. No distress.  HEENT: Normocephalic. Eyes conjunctiva clear lids without ptosis, PERRLA, ears normal shape and contour. Oral, ears, and nasal examine deferred due to current COVID-19 outbreak. Patient wore a mask during visit.  Neck: Supple without JVD or abnormal masses. Small soft mobile thyroid palpated, no nodules palpated, non-tender.  Pulmonary: Clear to ausculation.  Normal effort. No rales, ronchi, or wheezing.  Cardiovascular: Regular rate and rhythm without murmur. Pedal and radial pulses are intact and equal bilaterally.  Abdomen: Soft, nontender, nondistended. Normal bowel sounds.   Neurologic: " "Grossly non-focal.  Lymph: No cervical, submandibular, or supraclavicular lymph nodes are palpable.  Skin: Warm and dry. No obvious lesions. Left leg stump well perfused, no skin breakdown noted.  Musculoskeletal: Slight altered due to left BKA gait. No extremity cyanosis, clubbing, or edema. DTR+2  Psych: Normal mood and affect. Alert and oriented x3. Judgment and insight is normal.    Assessment & Plan:   1. Encounter to establish care with new doctor  Reviewed with patient medication use and side effects. Medical, past, surgical history reviewed with patient. Discussed with patient the risk and benefits of receiving vaccines. Discussed CDC recommendations for immunizations and USPSTF guidelines for screening exams. Discussed receiving COVID-19 vaccine when able, verbalized understanding and willingness. Encouraged patient to wash hands regularly and avoid sick contacts while supporting immune system.    2. S/P BKA (below knee amputation) unilateral (HCC)  This is a chronic stable condition. Discussed with patient due to her activity level and condition her prosthetic is in it is my recommendation that she needs a new \"by pin\" left prosthetic to continue her healthy active lifestyle. Discussed the importance of maintaining comfort when wearing leg and needing multiple socks a year, about 6-8 a year, and 4 liners a year. Discussed faxing prescription over to  Clinic and office visit notes as needed, verbalized understanding.    3. Chronic rhinitis  This is a chronic stable condition. Instructed to continue OTC allergy medication and/or Flonase as tolerated. Instructed to use Flonase 1 puff in each nares daily for one month and then as needed. Discussed possible side effects, verbalized understanding.     4. Migraine with aura and without status migrainosus, not intractable  This is a chronic stable condition.  Discussed preventative therapy and abortive therapies with patient. Instructed to take 400 mg of " Magnesium daily as tolerated. Discussed starting at 100 mg and titrate to 400 mg if tolerated. Discussed the benefit and side effects of Magnesium. Discussed starting Riboflavins (B2) 400 mg daily to prevent migraines, verbalized understanding. Instructed to take 200 mg of OTC CoQ10 every evening.  Discussed the importance of diet and water intake to decrease severity and frequency of migraines.  Instructed to slowly decrease caffeine intake to avoid rebound migraine. Encouraged to do neck stretches regularly, and to practice mindfulness and meditation to help relieve and stress triggers. Discussed not taking NSAIDs more than 3 days in row due to rebound head potential when stopped, verbalized understanding. Discussed possible side effects of GI upset and GI bleeding with long-term NSAID use, verbalized understanding. Instructed to take NSAIDs with food.  Instructed to take Imitrex as prescribed for abortive therapy as prescribed.      Return for as needed.    Please note that this dictation was created using voice recognition software. I have made every reasonable attempt to correct obvious errors, but I expect that there are errors of grammar and possibly content that I did not discover before finalizing the note.

## 2021-01-24 NOTE — ASSESSMENT & PLAN NOTE
Reports that she has intermittent migraines. States migraines are relieved by Imitrex 50 mg or nasal spray when she is too nauseated to use oral Imitrex. Denies any preventative supplements. Reports migraines are random and not frequent.

## 2021-01-24 NOTE — ASSESSMENT & PLAN NOTE
"Reports she is a patient of [facility]. Reports she is in the process of receiving a new prosthetic leg for her left leg due to BKA in 2011. 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 Reports due to her regular physical activity she is in need of a new left \"by pin\" prosthetic leg. Reports her current prosthetic is broken at greater toe and she is having to use coband around the ankle to hold it together. Reports she needs a new leg about every 3 years because of how active she is. States she has a \"flipper leg\" that works by suction, but it has fallen off in the past and the \"by pin\" is much more secure of a leg for her activity level. Reports she swims, exercises, and hikes regularly. 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 Reports that she is considered a K4 activity level due to her high activity of ambulation. Reports other than sleeping for 7 hours a night she feels she is on her feet all day long. Reports she has a strong desires to use her prosthesis and ability. Reports she is also needing a new leg because she has her back leg skin hanging over her prosthetic that causes intermittent discomfort. Reports she needs certain supplies in addition to her new leg. Reports every year she needs new socks for her left leg. Reports she needs as many as her insurance will allow. Reports she goes though them about every 2-3 months before they start to breakdown and cause skin irritation. Reports she needs 4 liners a year. States about every 3-4 months she needs a new liner because of them breaking down and when they do this her prosthetic is not as comfortable.  "

## 2021-01-24 NOTE — ASSESSMENT & PLAN NOTE
Reports that she has allergies and has chronic nasal congestion. Reports that she had sinus surgery in 2019 that improved symptoms. Reports taking Zyrtec daily.

## 2021-01-25 ENCOUNTER — TELEPHONE (OUTPATIENT)
Dept: MEDICAL GROUP | Facility: IMAGING CENTER | Age: 46
End: 2021-01-25

## 2021-01-25 NOTE — TELEPHONE ENCOUNTER
Please fax office visit note from 1/20/2021 to [facility], attn: Biju.  [phone]  JASPAL Watson

## 2021-05-19 ENCOUNTER — TELEMEDICINE (OUTPATIENT)
Dept: NEUROLOGY | Facility: MEDICAL CENTER | Age: 46
End: 2021-05-19
Attending: NURSE PRACTITIONER
Payer: COMMERCIAL

## 2021-05-19 VITALS — BODY MASS INDEX: 27.92 KG/M2 | WEIGHT: 195 LBS | HEIGHT: 70 IN

## 2021-05-19 DIAGNOSIS — E55.9 VITAMIN D DEFICIENCY: ICD-10-CM

## 2021-05-19 PROCEDURE — 99214 OFFICE O/P EST MOD 30 MIN: CPT | Mod: 95 | Performed by: NURSE PRACTITIONER

## 2021-05-19 RX ORDER — SUMATRIPTAN 20 MG/1
SPRAY NASAL
Qty: 5 EACH | Refills: 2 | Status: SHIPPED | OUTPATIENT
Start: 2021-05-19 | End: 2021-08-19 | Stop reason: SDUPTHER

## 2021-05-19 ASSESSMENT — FIBROSIS 4 INDEX: FIB4 SCORE: 0.4
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
## 2021-08-08 ENCOUNTER — HOSPITAL ENCOUNTER (OUTPATIENT)
Facility: MEDICAL CENTER | Age: 46
End: 2021-08-08
Attending: NURSE PRACTITIONER
Payer: COMMERCIAL

## 2021-08-08 ENCOUNTER — OFFICE VISIT (OUTPATIENT)
Dept: URGENT CARE | Facility: CLINIC | Age: 46
End: 2021-08-08
Payer: COMMERCIAL

## 2021-08-08 VITALS
SYSTOLIC BLOOD PRESSURE: 98 MMHG | DIASTOLIC BLOOD PRESSURE: 56 MMHG | HEART RATE: 84 BPM | RESPIRATION RATE: 20 BRPM | BODY MASS INDEX: 27.2 KG/M2 | TEMPERATURE: 97.7 F | WEIGHT: 190 LBS | OXYGEN SATURATION: 96 % | HEIGHT: 70 IN

## 2021-08-08 DIAGNOSIS — R10.30 LOWER ABDOMINAL PAIN: ICD-10-CM

## 2021-08-08 DIAGNOSIS — R11.0 NAUSEA: ICD-10-CM

## 2021-08-08 LAB
APPEARANCE UR: CLEAR
BILIRUB UR STRIP-MCNC: NORMAL MG/DL
COLOR UR AUTO: YELLOW
COVID ORDER STATUS COVID19: NORMAL
GLUCOSE UR STRIP.AUTO-MCNC: NORMAL MG/DL
INT CON NEG: NORMAL
INT CON POS: NORMAL
KETONES UR STRIP.AUTO-MCNC: NORMAL MG/DL
LEUKOCYTE ESTERASE UR QL STRIP.AUTO: NORMAL
NITRITE UR QL STRIP.AUTO: NORMAL
PH UR STRIP.AUTO: 7 [PH] (ref 5–8)
POC URINE PREGNANCY TEST: NEGATIVE
PROT UR QL STRIP: NORMAL MG/DL
RBC UR QL AUTO: NORMAL
SP GR UR STRIP.AUTO: 1.01
UROBILINOGEN UR STRIP-MCNC: 0.2 MG/DL

## 2021-08-08 PROCEDURE — U0005 INFEC AGEN DETEC AMPLI PROBE: HCPCS

## 2021-08-08 PROCEDURE — 81025 URINE PREGNANCY TEST: CPT | Performed by: NURSE PRACTITIONER

## 2021-08-08 PROCEDURE — U0003 INFECTIOUS AGENT DETECTION BY NUCLEIC ACID (DNA OR RNA); SEVERE ACUTE RESPIRATORY SYNDROME CORONAVIRUS 2 (SARS-COV-2) (CORONAVIRUS DISEASE [COVID-19]), AMPLIFIED PROBE TECHNIQUE, MAKING USE OF HIGH THROUGHPUT TECHNOLOGIES AS DESCRIBED BY CMS-2020-01-R: HCPCS

## 2021-08-08 PROCEDURE — 99214 OFFICE O/P EST MOD 30 MIN: CPT | Performed by: NURSE PRACTITIONER

## 2021-08-08 PROCEDURE — 81002 URINALYSIS NONAUTO W/O SCOPE: CPT | Performed by: NURSE PRACTITIONER

## 2021-08-08 RX ORDER — COVID-19 MOLECULAR TEST ASSAY
KIT MISCELLANEOUS
COMMUNITY
Start: 2021-06-12 | End: 2021-08-13

## 2021-08-08 ASSESSMENT — ENCOUNTER SYMPTOMS
COUGH: 0
BACK PAIN: 1
SORE THROAT: 0
BLOOD IN STOOL: 0
NAUSEA: 1
SHORTNESS OF BREATH: 0
DIARRHEA: 0
ABDOMINAL PAIN: 1
FEVER: 0
VOMITING: 0

## 2021-08-08 ASSESSMENT — FIBROSIS 4 INDEX: FIB4 SCORE: 0.4

## 2021-08-08 NOTE — PATIENT INSTRUCTIONS
Viral Gastroenteritis, Adult    Viral gastroenteritis is also known as the stomach flu. This condition may affect your stomach, small intestine, and large intestine. It can cause sudden watery diarrhea, fever, and vomiting. This condition is caused by many different viruses. These viruses can be passed from person to person very easily (are contagious).  Diarrhea and vomiting can make you feel weak and cause you to become dehydrated. You may not be able to keep fluids down. Dehydration can make you tired and thirsty, cause you to have a dry mouth, and decrease how often you urinate. It is important to replace the fluids that you lose from diarrhea and vomiting.  What are the causes?  Gastroenteritis is caused by many viruses, including rotavirus and norovirus. Norovirus is the most common cause in adults. You can get sick after being exposed to the viruses from other people. You can also get sick by:  · Eating food, drinking water, or touching a surface contaminated with one of these viruses.  · Sharing utensils or other personal items with an infected person.  What increases the risk?  You are more likely to develop this condition if you:  · Have a weak body defense system (immune system).  · Live with one or more children who are younger than 2 years old.  · Live in a nursing home.  · Travel on cruise ships.  What are the signs or symptoms?  Symptoms of this condition start suddenly 1-3 days after exposure to a virus. Symptoms may last for a few days or for as long as a week. Common symptoms include watery diarrhea and vomiting. Other symptoms include:  · Fever.  · Headache.  · Fatigue.  · Pain in the abdomen.  · Chills.  · Weakness.  · Nausea.  · Muscle aches.  · Loss of appetite.  How is this diagnosed?  This condition is diagnosed with a medical history and physical exam. You may also have a stool test to check for viruses or other infections.  How is this treated?  This condition typically goes away on its  own. The focus of treatment is to prevent dehydration and restore lost fluids (rehydration). This condition may be treated with:  · An oral rehydration solution (ORS) to replace important salts and minerals (electrolytes) in your body. Take this if told by your health care provider. This is a drink that is sold at pharmacies and retail stores.  · Medicines to help with your symptoms.  · Probiotic supplements to reduce symptoms of diarrhea.  · Fluids given through an IV, if dehydration is severe.  Older adults and people with other diseases or a weak immune system are at higher risk for dehydration.  Follow these instructions at home:    Eating and drinking    · Take an ORS as told by your health care provider.  · Drink clear fluids in small amounts as you are able. Clear fluids include:  ? Water.  ? Ice chips.  ? Diluted fruit juice.  ? Low-calorie sports drinks.  · Drink enough fluid to keep your urine pale yellow.  · Eat small amounts of healthy foods every 3-4 hours as you are able. This may include whole grains, fruits, vegetables, lean meats, and yogurt.  · Avoid fluids that contain a lot of sugar or caffeine, such as energy drinks, sports drinks, and soda.  · Avoid spicy or fatty foods.  · Avoid alcohol.  General instructions  · Wash your hands often, especially after having diarrhea or vomiting. If soap and water are not available, use hand .  · Make sure that all people in your household wash their hands well and often.  · Take over-the-counter and prescription medicines only as told by your health care provider.  · Rest at home while you recover.  · Watch your condition for any changes.  · Take a warm bath to relieve any burning or pain from frequent diarrhea episodes.  · Keep all follow-up visits as told by your health care provider. This is important.  Contact a health care provider if you:  · Cannot keep fluids down.  · Have symptoms that get worse.  · Have new symptoms.  · Feel light-headed or  dizzy.  · Have muscle cramps.  Get help right away if you:  · Have chest pain.  · Feel extremely weak or you faint.  · See blood in your vomit.  · Have vomit that looks like coffee grounds.  · Have bloody or black stools or stools that look like tar.  · Have a severe headache, a stiff neck, or both.  · Have a rash.  · Have severe pain, cramping, or bloating in your abdomen.  · Have trouble breathing or you are breathing very quickly.  · Have a fast heartbeat.  · Have skin that feels cold and clammy.  · Feel confused.  · Have pain when you urinate.  · Have signs of dehydration, such as:  ? Dark urine, very little urine, or no urine.  ? Cracked lips.  ? Dry mouth.  ? Sunken eyes.  ? Sleepiness.  ? Weakness.  Summary  · Viral gastroenteritis is also known as the stomach flu. It can cause sudden watery diarrhea, fever, and vomiting.  · This condition can be passed from person to person very easily (is contagious).  · Take an ORS if told by your health care provider. This is a drink that is sold at pharmacies and retail stores.  · Wash your hands often, especially after having diarrhea or vomiting. If soap and water are not available, use hand .  This information is not intended to replace advice given to you by your health care provider. Make sure you discuss any questions you have with your health care provider.  Document Released: 12/18/2006 Document Revised: 10/23/2019 Document Reviewed: 10/23/2019  [employer] Patient Education © 2020 [employer]      Abdominal Pain, Adult  Abdominal pain can be caused by many things. Often, abdominal pain is not serious and it gets better with no treatment or by being treated at home. However, sometimes abdominal pain is serious. Your health care provider will do a medical history and a physical exam to try to determine the cause of your abdominal pain.  Follow these instructions at home:  · Take over-the-counter and prescription medicines only as told by your health care  provider. Do not take a laxative unless told by your health care provider.  · Drink enough fluid to keep your urine clear or pale yellow.  · Watch your condition for any changes.  · Keep all follow-up visits as told by your health care provider. This is important.  Contact a health care provider if:  · Your abdominal pain changes or gets worse.  · You are not hungry or you lose weight without trying.  · You are constipated or have diarrhea for more than 2-3 days.  · You have pain when you urinate or have a bowel movement.  · Your abdominal pain wakes you up at night.  · Your pain gets worse with meals, after eating, or with certain foods.  · You are throwing up and cannot keep anything down.  · You have a fever.  Get help right away if:  · Your pain does not go away as soon as your health care provider told you to expect.  · You cannot stop throwing up.  · Your pain is only in areas of the abdomen, such as the right side or the left lower portion of the abdomen.  · You have bloody or black stools, or stools that look like tar.  · You have severe pain, cramping, or bloating in your abdomen.  · You have signs of dehydration, such as:  ? Dark urine, very little urine, or no urine.  ? Cracked lips.  ? Dry mouth.  ? Sunken eyes.  ? Sleepiness.  ? Weakness.  This information is not intended to replace advice given to you by your health care provider. Make sure you discuss any questions you have with your health care provider.  Document Released: 09/27/2006 Document Revised: 07/07/2017 Document Reviewed: 05/31/2017  [employer] Patient Education © 2020 [employer]

## 2021-08-08 NOTE — PROGRESS NOTES
Subjective:     Marcela Tiwari is a 45 y.o. female who presents for Abdominal Pain (dull, continuos pain, bloaded, lower back pain, discomfort, had frequency urinating for 30 minutes last night x 5 days)      Started Wednesday, generalized lower dull abdominal pain x 5 days. Did have sharper pain followed by looser stools. Feels bloated. Had frequency the other night, not since. No dysuria. LMP, on and off since chemo. Every few months, missed last month. Is sexually active. Back pain from posturing with new prostetic. Returned to using old prosthetic with improvement in back pain. Daughter has some sort of virus.    Abdominal Pain  This is a new problem. The current episode started in the past 7 days. The problem has been waxing and waning. The pain is at a severity of 3/10. The quality of the pain is dull. Associated symptoms include nausea. Pertinent negatives include no diarrhea, fever, hematuria or vomiting. Nothing aggravates the pain.       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   • Headache, classical migraine        Past Surgical History:   Procedure Laterality Date   • OTHER  2019    sinus surgery   • AMPUTATION, BELOW THE KNEE  2011   • BUNIONECTOMY         Social History     Socioeconomic History   • Marital status: Single     Spouse name: Not on file   • Number of children: Not on file   • Years of education: Not on file   • Highest education level: Not on file   Occupational History   • Not on file   Tobacco Use   • Smoking status: Never Smoker   • Smokeless tobacco: Never Used   Vaping Use   • Vaping Use: Never used   Substance and Sexual Activity   • Alcohol use: Not Currently     Alcohol/week: 2.0 oz     Types: 4 Glasses of wine per week   • Drug use: No   • Sexual activity: Yes     Partners: Male     Birth control/protection: None   Other Topics Concern   • Not on file   Social History Narrative   • Not on file     Social Determinants of Health  "    Financial Resource Strain:    • Difficulty of Paying Living Expenses:    Food Insecurity:    • Worried About Running Out of Food in the Last Year:    • Ran Out of Food in the Last Year:    Transportation Needs:    • Lack of Transportation (Medical):    • Lack of Transportation (Non-Medical):    Physical Activity:    • Days of Exercise per Week:    • Minutes of Exercise per Session:    Stress:    • Feeling of Stress :    Social Connections:    • Frequency of Communication with Friends and Family:    • Frequency of Social Gatherings with Friends and Family:    • Attends [employer] Services:    • Active Member of Clubs or Organizations:    • Attends Club or Organization Meetings:    • Marital Status:    Intimate Partner Violence:    • Fear of Current or Ex-Partner:    • Emotionally Abused:    • Physically Abused:    • Sexually Abused:         Family History   Problem Relation Age of Onset   • Other Mother 48        Parkinson's   • Arterial Aneurysm Father         brain   • Cancer Paternal Grandmother         breast cancer        Allergies   Allergen Reactions   • Seasonal Runny Nose     Seasonal allergies       Review of Systems   Constitutional: Positive for malaise/fatigue. Negative for fever.   HENT: Negative for sore throat.    Respiratory: Negative for cough and shortness of breath.    Gastrointestinal: Positive for abdominal pain and nausea. Negative for blood in stool, diarrhea and vomiting.   Genitourinary: Negative for hematuria.   Musculoskeletal: Positive for back pain.   All other systems reviewed and are negative.       Objective:   BP (!) 98/56 (BP Location: Left arm, Patient Position: Sitting, BP Cuff Size: Adult)   Pulse 84   Temp 36.5 °C (97.7 °F) (Temporal)   Resp 20   Ht 1.778 m (5' 10\")   Wt 86.2 kg (190 lb)   SpO2 96%   BMI 27.26 kg/m²     Physical Exam  Vitals reviewed.   Constitutional:       General: She is not in acute distress.     Appearance: She is well-developed.   HENT:      " Head: Normocephalic and atraumatic.      Right Ear: External ear normal.      Left Ear: External ear normal.      Nose: Nose normal.      Mouth/Throat:      Mouth: Mucous membranes are moist.      Pharynx: Oropharynx is clear.   Eyes:      Conjunctiva/sclera: Conjunctivae normal.   Cardiovascular:      Rate and Rhythm: Normal rate.   Pulmonary:      Effort: Pulmonary effort is normal.   Abdominal:      General: Bowel sounds are normal. There is no distension.      Palpations: Abdomen is soft.      Tenderness: There is no guarding.      Comments: Generalized abdominal discomfort to palpation.    Musculoskeletal:         General: Normal range of motion.      Cervical back: Normal range of motion.   Skin:     General: Skin is warm and dry.      Findings: No rash.   Neurological:      General: No focal deficit present.      Mental Status: She is alert and oriented to person, place, and time.      GCS: GCS eye subscore is 4. GCS verbal subscore is 5. GCS motor subscore is 6.   Psychiatric:         Mood and Affect: Mood normal.         Speech: Speech normal.         Behavior: Behavior normal.         Thought Content: Thought content normal.         Judgment: Judgment normal.         Assessment/Plan:   1. Lower abdominal pain  - POCT Urinalysis  - POCT Pregnancy  - CT-ABDOMEN-PELVIS WITH; Future  Results for orders placed or performed in visit on 08/08/21   POCT Urinalysis   Result Value Ref Range    POC Color YELLOW Negative    POC Appearance CLEAR Negative    POC Leukocyte Esterase Neg Negative    POC Nitrites Neg Negative    POC Urobiligen 0.2 Negative (0.2) mg/dL    POC Protein Neg Negative mg/dL    POC Urine PH 7.0 5.0 - 8.0    POC Blood Neg Negative    POC Specific Gravity 1.015 <1.005 - >1.030    POC Ketones Trace Negative mg/dL    POC Bilirubin Neg Negative mg/dL    POC Glucose Neg Negative mg/dL   POCT Pregnancy   Result Value Ref Range    POC Urine Pregnancy Test Negative Negative    Internal Control Positive  Valid     Internal Control Negative Valid      2. Nausea  - COVID/SARS CoV-2; Future    -Increase fluids.  -Rest.  -Advance diet as tolerated starting with bland, low fat meals.   -Avoid alcohol, coffee, nicotine and spicy foods  -Follow up with PCP or clinic in 3 days if not feeling better.    Follow up emergently for more than 6 watery stools in a 24 hour period, blood or mucus in stool, fever greater than 101.2, inability to tolerated fluids, signs of dehydration, weakness, elevated heart rate, increased or persistent abdominal pain.    Watchful wait, ER precautions. Follow up with imaging for persistent pain, ER for worsening pain. Viral illness, vs other intraabdominal cause for pain. Had recent US for abnormal vaginal bleeding, to r/o fibroids.     Differential diagnosis, natural history, supportive care, and indications for immediate follow-up discussed.

## 2021-08-09 LAB
SARS-COV-2 RNA RESP QL NAA+PROBE: NOTDETECTED
SPECIMEN SOURCE: NORMAL

## 2021-08-11 ENCOUNTER — APPOINTMENT (OUTPATIENT)
Dept: RADIOLOGY | Facility: MEDICAL CENTER | Age: 46
End: 2021-08-11
Attending: NURSE PRACTITIONER
Payer: COMMERCIAL

## 2021-08-19 ENCOUNTER — OFFICE VISIT (OUTPATIENT)
Dept: MEDICAL GROUP | Facility: IMAGING CENTER | Age: 46
End: 2021-08-19
Payer: COMMERCIAL

## 2021-08-19 VITALS
HEART RATE: 68 BPM | TEMPERATURE: 97.9 F | HEIGHT: 70 IN | SYSTOLIC BLOOD PRESSURE: 102 MMHG | WEIGHT: 194.6 LBS | BODY MASS INDEX: 27.86 KG/M2 | OXYGEN SATURATION: 96 % | DIASTOLIC BLOOD PRESSURE: 60 MMHG | RESPIRATION RATE: 18 BRPM

## 2021-08-19 DIAGNOSIS — Z89.519 S/P BKA (BELOW KNEE AMPUTATION) UNILATERAL (HCC): ICD-10-CM

## 2021-08-19 DIAGNOSIS — R14.0 BLOATING: ICD-10-CM

## 2021-08-19 DIAGNOSIS — Z13.220 SCREENING FOR HYPERLIPIDEMIA: ICD-10-CM

## 2021-08-19 DIAGNOSIS — R10.30 LOWER ABDOMINAL PAIN: ICD-10-CM

## 2021-08-19 DIAGNOSIS — Z13.1 SCREENING FOR DIABETES MELLITUS (DM): ICD-10-CM

## 2021-08-19 DIAGNOSIS — M54.50 ACUTE BILATERAL LOW BACK PAIN WITHOUT SCIATICA: ICD-10-CM

## 2021-08-19 DIAGNOSIS — Z13.0 SCREENING FOR DEFICIENCY ANEMIA: ICD-10-CM

## 2021-08-19 PROCEDURE — 99214 OFFICE O/P EST MOD 30 MIN: CPT | Performed by: NURSE PRACTITIONER

## 2021-08-19 RX ORDER — SUMATRIPTAN 20 MG/1
SPRAY NASAL
Qty: 5 EACH | Refills: 2 | Status: SHIPPED | OUTPATIENT
Start: 2021-08-19 | End: 2022-03-18 | Stop reason: SDUPTHER

## 2021-08-19 RX ORDER — CYCLOBENZAPRINE HCL 5 MG
5-10 TABLET ORAL 2 TIMES DAILY PRN
Qty: 60 TABLET | Refills: 0 | Status: SHIPPED | OUTPATIENT
Start: 2021-08-19 | End: 2022-11-10

## 2021-08-19 ASSESSMENT — PAIN SCALES - GENERAL: PAINLEVEL: 3=SLIGHT PAIN

## 2021-08-19 ASSESSMENT — FIBROSIS 4 INDEX: FIB4 SCORE: 0.4

## 2021-08-19 NOTE — PATIENT INSTRUCTIONS
The probiotic for overall GI health:  Lactobacillus Acidophilus  Bacillus Coagulans  Bifidobacterium Lactis  Lactobacillus Casei    Increase fiber to a daily total of 25-35 grams as tolerated for overall health benefit.  Keep a 7-day log of average fiber intake before increasing fiber. Seek fiber from your daily food intake. A you increase your fiber you can supplement fiber intake with psyllium husk as tolerated. Increase fiber by 2 grams every 5 days as tolerated. Possible GI upset as  You increase your fiber.      Instructed to use Biofreeze and/or Arnica gel topical as tolerated to areas of pain for external use only.     Discussed using ice therapy 4 times a day for 20 minutes each time.    Discussed taking ibuprofen 200-600 mg TID as needed for pain with food. Instructed to not exceed more than 3 days in a row to decrease risk of GI bleed or GI upset.

## 2021-08-19 NOTE — PROGRESS NOTES
Subjective:   CC: Abdominal Pain (x 2 weeks, dull, UC 08/08/21)    HPI:   Marcela presents today to discuss:    Reports that she has been experiencing lower back pain for over a month.  States that she has had a new prosthetic leg made for her and it has caused her to have lower back pain.  States that she has returned back to her old leg.  Reports that she continues to work with prosthetic company to try to fit new leg better.  States that she had her chiropractor completed a lower back x-rayed and it was noted with her new leg that her spine was slightly off and possibly causing lower back pain.  Denies sciatic pain.  Denies any red flag symptoms, including urinary and stool incontinence, urinary retention, and saddle paresthesia.  Reports that the pain makes it difficult to sit for long period time.  States that she has been getting massages and attempting to stretch regularly with minimal relief.    Migraines  Reports that she has recently had a migraine.  States that she used Imitrex nasal spray to alleviate symptoms.  Reports that she vomited during her migraine.  States that she needs a refill on medication.  Reports that during recent migraine she also took 25 mg of Benadryl due to her sister advising it.  Reports that she feels that Benadryl was beneficial.    Lower abdominal pain  Reports that she has been experiencing dull achy lower abdomen pain for the last 2 weeks.  States that she has recently started her menstrual cycle on August 17.  States that her cycles are irregular.  States that she does not get a cycle every month.  States that she eats healthy and also takes Kifer milk, as well as, drinking kombucha regularly.  Reports that she is having intermittent bloating.  Denies any changes with her bowel movements.  States that she does not feel that she has had any increased stress in her life.  Reports a year and a half ago having a transvaginal ultrasound completed at OB/GYN's office.  States that her  menstrual cycles were heavy and and it was thought that she possibly was experiencing fibroids.  States that she sees Dr. Musa at [facility].  Denies any N/V/C/D.  Reports that pain extends across entire abdomen.  Denies any fever, chills, body aches, blood with bowel movements, and black tarry stool.  Reports that she had was seen at local urgent care at the beginning of August and a CT ordered at that time.  States that she had it scheduled, but to cancel it due to her schedule.  Reports that she plans on rescheduling it.    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   • Headache, classical migraine      Social History     Tobacco Use   • Smoking status: Never Smoker   • Smokeless tobacco: Never Used   Vaping Use   • Vaping Use: Never used   Substance Use Topics   • Alcohol use: Not Currently     Alcohol/week: 2.0 oz     Types: 4 Glasses of wine per week   • Drug use: No     Current Outpatient Medications Ordered in Epic   Medication Sig Dispense Refill   • Cetirizine HCl (ZYRTEC PO) Take  by mouth.     • cyclobenzaprine (FLEXERIL) 5 mg tablet Take 1-2 Tablets by mouth 2 times a day as needed for Moderate Pain. 60 Tablet 0   • sumatriptan (IMITREX) 20 MG/ACT nasal spray USE 1 SPRAY(S) ONCE DAILY IF HEADACHE RETURNS DOSE MAY BE REPEATED ONCE AFTER 2 HOURS NOT TO EXCEED 40 MG PER DAY FOR 30 DAYS 5 Each 2     No current Epic-ordered facility-administered medications on file.     Allergies:  Seasonal    Health Maintenance: Completed.    ROS:  Constitutional: Denies fever, chills, night sweats, weight loss/gain and/or malaise/fatigue.   HENT: Denies sore throat, hearing loss, enlarged thyroid, or difficulty swallowing. Chronic nasal congestion.  Eyes: Denies changes in vision, pain. Does wear corrective wear, glasses  Respiratory: Denies cough, SOB at rest or activity.    Cardiovascular: Denies tachycardia, chest pain, palpitations, or  leg swelling.   Gastrointestinal:  "Denies N/V/C/D, loss appetite, reflux, or hematochezia. Abdomen pain, see HPI.  Genitourinary: Denies difficulty voiding, dysuria, nocturia, or hematuria.   Skin: Negative for rash or worrisome moles.   Neurological: Negative for dizziness, focal weakness. History of migraines, see HPI.  Endo/Heme/Allergies: Denies bruise/bleed easily. Hx of seasonal allergies.  Psychiatric/Behavioral: Denies depression, nervous/anxious, difficulty sleeping.  MSK: BKA.  Lower back pain, see HPI.    Objective:   Exam:  /60 (BP Location: Left arm, Patient Position: Sitting, BP Cuff Size: Adult)   Pulse 68   Temp 36.6 °C (97.9 °F) (Temporal)   Resp 18   Ht 1.778 m (5' 10\")   Wt 88.3 kg (194 lb 9.6 oz)   SpO2 96%   BMI 27.92 kg/m²  Body mass index is 27.92 kg/m².  General: Normal appearing. No distress.  HEENT: Normocephalic. Eyes conjunctiva clear lids without ptosis, PERRLA, ears normal shape and contour. Oral, ears, and nasal examine deferred due to current COVID-19 outbreak, no acute concerns. Patient wore a mask during visit.  Neck: Trachea midline, no masses, no thyromegaly.  Pulmonary: Clear to ausculation.  Normal effort. No rales, ronchi, or wheezing.  Cardiovascular: Regular rate and rhythm without murmur. Pedal and radial pulses are intact and equal bilaterally.  Abdomen: Soft, nontender, nondistended. Normal bowel sounds. Liver and spleen are not palpable.  Negative Young, McBurney, Rovsing's sign.  No rebound tenderness noted in lower abdomen.  Neurologic: Grossly non-focal.  Skin: Warm and dry. No obvious lesions.  Musculoskeletal: Normal gait. No extremity cyanosis, clubbing, or edema. DTR+2.  No edema and/or masses noted on and/or along spine.  Increased muscle tension noted in lower back starting at thoracic extending to lumbar.  Psych: Normal mood and affect. Alert and oriented x3. Judgment and insight is normal.    Assessment & Plan:   1. Screening for diabetes mellitus (DM)  2. Screening for " hyperlipidemia  3. Screening for deficiency anemia   Discussed preventive screening labs with patient, verbalized understanding. Will evaluate plan of care once labs are obtained.    - Comp Metabolic Panel; Future  - Lipid Profile; Future  - CBC WITH DIFFERENTIAL; Future    4. Bloating  5. Lower abdominal pain  This is a chronic stable condition with acute flareup. Discussed increasing fiber to a daily total of 20-30 grams as tolerated for overall health benefit.  Discussed keeping a 7-day log of average fiber intake before increasing fiber. Discussed seeking fiber from her daily food intake, discussed different foods that are higher in fiber. Discussed with patient as she increases her fiber she can supplement fiber intake with OTC psyllium husk as tolerated, verbalized understanding. Instructed to increase fiber by 2 grams every 5 days as tolerated. Discussed possible GI upset as she increases her fiber, verbalized understanding.  List provided to patient discussing different types of probiotics to improve overall GI health, verbalized understanding. Will evaluate plan of care once labs are obtained.  Discussed seeking emergency services if her experiences worse symptoms.  Instructed to complete previously ordered CT scan.    - TSH WITH REFLEX TO FT4; Future    6. Acute bilateral low back pain without sciatica  7. S/P BKA (below knee amputation) unilateral (HCC)  This is a stable condition.  Discussed with patient at this time it appears to be musculoskeletal and possibly caused from elevated new prostatic, verbalized understanding.  Discussed with patient physical assessment findings of increased muscle tension from thoracic spine down.  Discussed with patient using Flexeril muscle relaxer to help relieve tension.  Discussed possible side effects of medication with patient discussed patient not to take any other sedative medications and/or drinking alcohol while on medication.  Discussed with patient not driving  vehicle when she takes Flexeril, verbalized understanding. Discussed referral to PT to learning exercises to strengthening muscles, stretches, and possible decreasing reported pain, verbalized understanding and willingness to participation in referral.  Instructed to take old and new prosthetic to physical therapy to be able to help possibly transitioning to new leg, verbalized understanding and willingness.    - cyclobenzaprine (FLEXERIL) 5 mg tablet; Take 1-2 Tablets by mouth 2 times a day as needed for Moderate Pain.  Dispense: 60 Tablet; Refill: 0  - REFERRAL TO PHYSICAL THERAPY    8. Chronic migraine  This is a chronic stable condition.  Refill provided to patient.  Aware of possible side effects.    - sumatriptan (IMITREX) 20 MG/ACT nasal spray; USE 1 SPRAY(S) ONCE DAILY IF HEADACHE RETURNS DOSE MAY BE REPEATED ONCE AFTER 2 HOURS NOT TO EXCEED 40 MG PER DAY FOR 30 DAYS  Dispense: 5 Each; Refill: 2    Return for Pending labs and imaging.    Please note that this dictation was created using voice recognition software. I have made every reasonable attempt to correct obvious errors, but I expect that there are errors of grammar and possibly content that I did not discover before finalizing the note.

## 2021-08-20 ENCOUNTER — HOSPITAL ENCOUNTER (OUTPATIENT)
Dept: LAB | Facility: MEDICAL CENTER | Age: 46
End: 2021-08-20
Attending: NURSE PRACTITIONER
Payer: COMMERCIAL

## 2021-08-20 DIAGNOSIS — Z13.1 SCREENING FOR DIABETES MELLITUS (DM): ICD-10-CM

## 2021-08-20 DIAGNOSIS — Z13.0 SCREENING FOR DEFICIENCY ANEMIA: ICD-10-CM

## 2021-08-20 DIAGNOSIS — R10.30 LOWER ABDOMINAL PAIN: ICD-10-CM

## 2021-08-20 DIAGNOSIS — R14.0 BLOATING: ICD-10-CM

## 2021-08-20 DIAGNOSIS — Z13.220 SCREENING FOR HYPERLIPIDEMIA: ICD-10-CM

## 2021-08-20 LAB
ALBUMIN SERPL BCP-MCNC: 4.5 G/DL (ref 3.2–4.9)
ALBUMIN/GLOB SERPL: 1.5 G/DL
ALP SERPL-CCNC: 66 U/L (ref 30–99)
ALT SERPL-CCNC: 14 U/L (ref 2–50)
ANION GAP SERPL CALC-SCNC: 10 MMOL/L (ref 7–16)
AST SERPL-CCNC: 14 U/L (ref 12–45)
BASOPHILS # BLD AUTO: 0.8 % (ref 0–1.8)
BASOPHILS # BLD: 0.04 K/UL (ref 0–0.12)
BILIRUB SERPL-MCNC: 0.3 MG/DL (ref 0.1–1.5)
BUN SERPL-MCNC: 18 MG/DL (ref 8–22)
CALCIUM SERPL-MCNC: 9.5 MG/DL (ref 8.5–10.5)
CHLORIDE SERPL-SCNC: 102 MMOL/L (ref 96–112)
CHOLEST SERPL-MCNC: 171 MG/DL (ref 100–199)
CO2 SERPL-SCNC: 27 MMOL/L (ref 20–33)
CREAT SERPL-MCNC: 0.74 MG/DL (ref 0.5–1.4)
EOSINOPHIL # BLD AUTO: 0.35 K/UL (ref 0–0.51)
EOSINOPHIL NFR BLD: 6.7 % (ref 0–6.9)
ERYTHROCYTE [DISTWIDTH] IN BLOOD BY AUTOMATED COUNT: 45.4 FL (ref 35.9–50)
FASTING STATUS PATIENT QL REPORTED: NORMAL
GLOBULIN SER CALC-MCNC: 3 G/DL (ref 1.9–3.5)
GLUCOSE SERPL-MCNC: 95 MG/DL (ref 65–99)
HCT VFR BLD AUTO: 42.5 % (ref 37–47)
HDLC SERPL-MCNC: 69 MG/DL
HGB BLD-MCNC: 13.9 G/DL (ref 12–16)
IMM GRANULOCYTES # BLD AUTO: 0.01 K/UL (ref 0–0.11)
IMM GRANULOCYTES NFR BLD AUTO: 0.2 % (ref 0–0.9)
LDLC SERPL CALC-MCNC: 89 MG/DL
LYMPHOCYTES # BLD AUTO: 1.73 K/UL (ref 1–4.8)
LYMPHOCYTES NFR BLD: 33.1 % (ref 22–41)
MCH RBC QN AUTO: 30.9 PG (ref 27–33)
MCHC RBC AUTO-ENTMCNC: 32.7 G/DL (ref 33.6–35)
MCV RBC AUTO: 94.4 FL (ref 81.4–97.8)
MONOCYTES # BLD AUTO: 0.32 K/UL (ref 0–0.85)
MONOCYTES NFR BLD AUTO: 6.1 % (ref 0–13.4)
NEUTROPHILS # BLD AUTO: 2.77 K/UL (ref 2–7.15)
NEUTROPHILS NFR BLD: 53.1 % (ref 44–72)
NRBC # BLD AUTO: 0 K/UL
NRBC BLD-RTO: 0 /100 WBC
PLATELET # BLD AUTO: 247 K/UL (ref 164–446)
PMV BLD AUTO: 10.8 FL (ref 9–12.9)
POTASSIUM SERPL-SCNC: 4.1 MMOL/L (ref 3.6–5.5)
PROT SERPL-MCNC: 7.5 G/DL (ref 6–8.2)
RBC # BLD AUTO: 4.5 M/UL (ref 4.2–5.4)
SODIUM SERPL-SCNC: 139 MMOL/L (ref 135–145)
TRIGL SERPL-MCNC: 63 MG/DL (ref 0–149)
TSH SERPL DL<=0.005 MIU/L-ACNC: 1.69 UIU/ML (ref 0.38–5.33)
WBC # BLD AUTO: 5.2 K/UL (ref 4.8–10.8)

## 2021-08-20 PROCEDURE — 84443 ASSAY THYROID STIM HORMONE: CPT

## 2021-08-20 PROCEDURE — 85025 COMPLETE CBC W/AUTO DIFF WBC: CPT

## 2021-08-20 PROCEDURE — 80053 COMPREHEN METABOLIC PANEL: CPT

## 2021-08-20 PROCEDURE — 80061 LIPID PANEL: CPT

## 2021-08-20 PROCEDURE — 36415 COLL VENOUS BLD VENIPUNCTURE: CPT

## 2021-08-21 ENCOUNTER — TELEPHONE (OUTPATIENT)
Dept: MEDICAL GROUP | Facility: IMAGING CENTER | Age: 46
End: 2021-08-21

## 2021-08-21 NOTE — ASSESSMENT & PLAN NOTE
Reports that she has been experiencing dull achy lower abdomen pain for the last 2 weeks.  States that she has recently started her menstrual cycle on August 17.  States that her cycles are irregular.  States that she does not get a cycle every month.  States that she eats healthy and also takes Kifer milk, as well as, drinking kombucha regularly.  Reports that she is having intermittent bloating.  Denies any changes with her bowel movements.  States that she does not feel that she has had any increased stress in her life.  Reports a year and a half ago having a transvaginal ultrasound completed at OB/GYN's office.  States that her menstrual cycles were heavy and and it was thought that she possibly was experiencing fibroids.  States that she sees Dr. Musa at [facility].  Denies any N/V/C/D.  Reports that pain extends across entire abdomen.  Denies any fever, chills, body aches, blood with bowel movements, and black tarry stool.  Reports that she had was seen at local urgent care at the beginning of August and a CT ordered at that time.  States that she had it scheduled, but to cancel it due to her schedule.  Reports that she plans on rescheduling it.

## 2021-08-21 NOTE — TELEPHONE ENCOUNTER
Please request Pap smear from [facility].  Please also request transvaginal ultrasound.  Patient reports having one in 2019.    Wendi Lee, MARIANNEC

## 2021-08-21 NOTE — ASSESSMENT & PLAN NOTE
Reports that she has recently had a migraine.  States that she used Imitrex nasal spray to alleviate symptoms.  Reports that she vomited during her migraine.  States that she needs a refill on medication.  Reports that during recent migraine she also took 25 mg of Benadryl due to her sister advising it.  Reports that she feels that Benadryl was beneficial.

## 2021-08-29 ENCOUNTER — PATIENT MESSAGE (OUTPATIENT)
Dept: NEUROLOGY | Facility: MEDICAL CENTER | Age: 46
End: 2021-08-29

## 2021-08-29 ENCOUNTER — PATIENT MESSAGE (OUTPATIENT)
Dept: MEDICAL GROUP | Facility: IMAGING CENTER | Age: 46
End: 2021-08-29

## 2021-08-29 DIAGNOSIS — Z85.830 HX OF EWING'S SARCOMA: ICD-10-CM

## 2021-09-02 ENCOUNTER — OFFICE VISIT (OUTPATIENT)
Dept: NEUROLOGY | Facility: MEDICAL CENTER | Age: 46
End: 2021-09-02
Attending: PSYCHIATRY & NEUROLOGY
Payer: COMMERCIAL

## 2021-09-02 VITALS
OXYGEN SATURATION: 94 % | DIASTOLIC BLOOD PRESSURE: 70 MMHG | SYSTOLIC BLOOD PRESSURE: 108 MMHG | BODY MASS INDEX: 28.03 KG/M2 | WEIGHT: 195.77 LBS | HEIGHT: 70 IN | TEMPERATURE: 97.6 F | HEART RATE: 80 BPM

## 2021-09-02 PROCEDURE — 99211 OFF/OP EST MAY X REQ PHY/QHP: CPT | Performed by: PSYCHIATRY & NEUROLOGY

## 2021-09-02 PROCEDURE — 99215 OFFICE O/P EST HI 40 MIN: CPT | Performed by: PSYCHIATRY & NEUROLOGY

## 2021-09-02 RX ORDER — PREDNISONE 5 MG/1
TABLET ORAL
COMMUNITY
Start: 2021-08-31 | End: 2022-03-18

## 2021-09-02 RX ORDER — SUMATRIPTAN 50 MG/1
50 TABLET, FILM COATED ORAL
COMMUNITY
End: 2021-10-01 | Stop reason: SDUPTHER

## 2021-09-02 ASSESSMENT — FIBROSIS 4 INDEX: FIB4 SCORE: 0.65

## 2021-09-02 ASSESSMENT — PATIENT HEALTH QUESTIONNAIRE - PHQ9
SUM OF ALL RESPONSES TO PHQ QUESTIONS 1-9: 6
CLINICAL INTERPRETATION OF PHQ2 SCORE: 2
5. POOR APPETITE OR OVEREATING: 1 - SEVERAL DAYS

## 2021-09-02 NOTE — PATIENT INSTRUCTIONS
Try supplementing:  - magnesium: 400-600 mg once or 200-300 mg twice daily  - riboflavin (vitamin B2): 400 mg once daily  - coenzyme Q10: 300 mg daily

## 2021-09-02 NOTE — PROGRESS NOTES
"[facility]UROLOG[facility]  GENERAL NEUROLOGY  FOLLOW-UP VISIT    CC: migraine    INTERVAL HISTORY:  Marcela Tiwari is a 45 y.o. woman with migraines as well as a history of migraine, Ludwig's sarcoma (diagnosed 2006), and s/p BKA (2011).  She last saw Ngoc Hooks on 5/19/2021.  At that time they planned to start Emgality.  Today, she was unaccompanied, and she provided the following interval history:    11/2019:  Marcela underwent sinus surgery.    The following is a summary of headache symptoms, presented in my standard format:    Family History: father had headaches  Age at onset: 27-28  Location: variable, if due to stress: shoulders with radiation to the occiput and temples  Radiation: see above  Frequency: 2-3 days/week  Duration: without treatment: hasn't tried this, with treatment: 1 hours  Headache Days/Month: variable, pain tends to occur in \"clusters\"  Quality: feels like her head is \"about to explode\" \"pressure\"  Intensity: 10/10  Aura: some discomfort in the posterior cervical area  Photophobia/Phonophobia/Nausea/Vomiting: yes/yes/yes/once  Provoked by Physical Activity?:   Triggers: teaching passionately, alcohol  Associated Symptoms:   Autonomic Signs (such as ptosis, miosis, conjunctival injection, rhinorrhea, increased lacrimation):   Head Trauma:   Association with Menses:   ED Visits:   Hospitalizations:   Missed Work Days ( at [facility]):  Sleep: 9.5 hours  Caffeine Intake: 1 per morning  Hydration: keeps well hydrated  Nutrition: tries not to skip meals  Exercise:   Analgesic Overuse:     Current Medication Regimen:  - Emgality: just picked up the prescription for this  - sumatriptan: 50 mg, effective  - sumatriptan: 20 mg IN, effective    Medications Tried: Response  Preventive:  - amitriptyline: 10 mg caused her to feel \"very weird in the morning\"    Abortive:  - Excedrin    Medications Not Tried:  - topiramate  - propranolol    MEDICATIONS:  Current Outpatient Medications "   Medication Sig   • predniSONE (DELTASONE) 5 MG Tab TAKE 4 TABLETS BY MOUTH AT BREAKFAST(B) LUNCH (L) DINNER(D) ON DAY 1 & 2 THEN 4 TABS AT B 3 TABS AT L & D ON DAY 3 THEN 3 TABS AT B&L 2 TABS AT D ON DAY 4 THEN 2 TABS AT B L&D ON DAY 5 THEN 2 TABS AT B 1 TAB AT L&D ON DAY 6 THEN 1 TAB AT B & D ON DAY 7   • SUMAtriptan (IMITREX) 50 MG Tab Take 50 mg by mouth one time as needed for Migraine.   • Galcanezumab-gnlm 120 MG/ML Solution Auto-injector Inject 240mg mg under the skin first month, then 120mg every 4 weeks.   • Cetirizine HCl (ZYRTEC PO) Take  by mouth.   • cyclobenzaprine (FLEXERIL) 5 mg tablet Take 1-2 Tablets by mouth 2 times a day as needed for Moderate Pain.   • sumatriptan (IMITREX) 20 MG/ACT nasal spray USE 1 SPRAY(S) ONCE DAILY IF HEADACHE RETURNS DOSE MAY BE REPEATED ONCE AFTER 2 HOURS NOT TO EXCEED 40 MG PER DAY FOR 30 DAYS     MEDICAL, SOCIAL, AND FAMILY HISTORY:  There is no change in the patient's ROS or medical, social, or family histories since the previous visit on 5/19/2021.    REVIEW OF SYSTEMS:  A ROS was completed.  Pertinent positives and negatives were included in the HPI, above.  All other systems were reviewed and are negative.    PHYSICAL EXAM:  General/Medical:  - NAD  - hair, skin, nails, and joints were normal    Neuro:  MENTAL STATUS: awake and alert; no deficits of speech or language; oriented to person, place, and time; affect was appropriate to situation; pleasant, cooperative    CRANIAL NERVES:    II: acuity: J1+/J1+, fields: intact to confrontation, pupils: 3/3 to 2/2 without a relative afferent pupillary defect, discs: sharp    III/IV/VI: versions: intact without nystagmus    V: facial sensation: symmetric to light touch    VII: facial expression: symmetric    VIII: hearing: intact to finger rub    IX/X: palate: elevates symmetrically    XI: shoulder shrug: symmetric    XII: tongue: midline    MOTOR:  - bulk: normal throughout  - tone: normal in the upper extremities  Upper  Extremity Strength  (R/L)    5/5   Elbow flexion 5/5   Elbow extension 5/5   Shoulder abduction 5/5     Lower Extremity Strength  (R/L)   Hip flexion 5/5   Knee extension 5/NT   Knee flexion 5/X   Ankle plantarflexion 5/X   Ankle dorsiflexion 5/X     - pronator drift: absent  - abnormal movements: none    SENSATION:  - light touch: grossly intact over the upper and lower extremities  - vibration (R/L, seconds): NT at the great toes  - pinprick: NT  - proprioception: NT  - Romberg: absent    COORDINATION:  - finger to nose: normal, no ataxia on exam  - finger tapping: rapid and accurate, bilaterally    REFLEXES:  Reflex Right Left   BR 1+ 1+   Biceps 1+ 1+   Triceps 1+ 1+   Patellae NT NT   Achilles NT X   Toes NT X     GAIT:  - narrow base and normal    REVIEW OF IMAGING STUDIES:  No recent images available for review.    REVIEW OF LABORATORY STUDIES:  2021:  - CBC w/ diff: WNL  - CMP: WNL    ASSESSMENT:  Marcela Tiwari is a 45 y.o. woman with chronic migraine.  Plans/recommendations as follows:    PLAN:  Chronic Migraine:  Prevention:  - proceed with Emgality (first doses administered in the clinic today)  - try supplementing:   - magnesium: 400-600 mg once or 200-300 mg twice daily   - riboflavin (vitamin B2): 400 mg once daily   - coenzyme Q10: 300 mg daily  - get 7-9 hours of sleep per night  - drink plenty of fluids (urine should be nearly clear)  - avoid excessive caffeine intake (no more than 2 servings per day and nothing in the afternoon)  - eat regular meals (don't skip meals)  - get moderate exercise (even just a 20 minute walk daily)    :  - trial of Nurtec: take 75 mg at the onset of headache pain; MDD: 75 mg; do not dose within 24 hours of dosing a triptan  - continue sumatriptan 50 mg PO: take this at the onset of headache pain; may re-dose x1 after 2 hours if headache persists; MDD: 200 mg/24 hours do not use more than 2 days/week OR  - continue sumatriptan 20 mg IN: take  "this at the onset of headache; may re-dose x1 after 2 hours if headache persists; MDD: 40 mg/24 hours  - do not use analgesics (e.g., ibuprofen, acetaminophen) more than 2 days per week in order to avoid analgesic rebound headaches    - keep a headache log    Follow-Up:  - No follow-ups on file.    Signed: Caesar Vigil M.D.    BILLING DOCUMENTATION:   I spent 52 minutes reviewing the medical record, interviewing and examining the patient, discussing my impression (see \"assessment\" above), and coordinating care.  "

## 2021-09-02 NOTE — LETTER
September 2, 2021    Dear [facility]:    This is confirmation that Marcela Tiwari attended her scheduled appointment with Caesar Vigil M.D. on 9/02/21.    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    If you have any questions please do not hesitate to call me at the phone number listed below.    Sincerely,    Caesar Vigil M.D.  [phone]

## 2021-09-09 ENCOUNTER — HOSPITAL ENCOUNTER (OUTPATIENT)
Dept: RADIOLOGY | Facility: MEDICAL CENTER | Age: 46
End: 2021-09-09
Attending: NURSE PRACTITIONER
Payer: COMMERCIAL

## 2021-09-09 DIAGNOSIS — R10.30 LOWER ABDOMINAL PAIN: ICD-10-CM

## 2021-09-09 PROCEDURE — 700117 HCHG RX CONTRAST REV CODE 255: Performed by: NURSE PRACTITIONER

## 2021-09-09 PROCEDURE — 74177 CT ABD & PELVIS W/CONTRAST: CPT

## 2021-09-09 RX ADMIN — IOHEXOL 100 ML: 350 INJECTION, SOLUTION INTRAVENOUS at 09:50

## 2021-09-16 ENCOUNTER — OFFICE VISIT (OUTPATIENT)
Dept: HEMATOLOGY ONCOLOGY | Facility: MEDICAL CENTER | Age: 46
End: 2021-09-16
Payer: COMMERCIAL

## 2021-09-16 VITALS
HEIGHT: 70 IN | SYSTOLIC BLOOD PRESSURE: 96 MMHG | BODY MASS INDEX: 27.87 KG/M2 | OXYGEN SATURATION: 95 % | DIASTOLIC BLOOD PRESSURE: 58 MMHG | TEMPERATURE: 98 F | HEART RATE: 88 BPM | RESPIRATION RATE: 16 BRPM | WEIGHT: 194.67 LBS

## 2021-09-16 DIAGNOSIS — Z85.830 HX OF EWING'S SARCOMA: ICD-10-CM

## 2021-09-16 PROCEDURE — 99203 OFFICE O/P NEW LOW 30 MIN: CPT | Performed by: STUDENT IN AN ORGANIZED HEALTH CARE EDUCATION/TRAINING PROGRAM

## 2021-09-16 ASSESSMENT — ENCOUNTER SYMPTOMS
INSOMNIA: 0
DIAPHORESIS: 0
BLURRED VISION: 0
SORE THROAT: 0
ORTHOPNEA: 0
WHEEZING: 0
BACK PAIN: 1
HEADACHES: 1
BRUISES/BLEEDS EASILY: 0
WEIGHT LOSS: 0
CHILLS: 0
CONSTIPATION: 0
ABDOMINAL PAIN: 0
NERVOUS/ANXIOUS: 0
PALPITATIONS: 0
DIZZINESS: 0
SPUTUM PRODUCTION: 0
COUGH: 0
BLOOD IN STOOL: 0
HEARTBURN: 0
MYALGIAS: 0
DOUBLE VISION: 0
NAUSEA: 0
TINGLING: 0
SHORTNESS OF BREATH: 0
SINUS PAIN: 0
FEVER: 0
VOMITING: 0
WEAKNESS: 0
DIARRHEA: 0

## 2021-09-16 ASSESSMENT — PAIN SCALES - GENERAL: PAINLEVEL: NO PAIN

## 2021-09-16 ASSESSMENT — FIBROSIS 4 INDEX: FIB4 SCORE: 0.65

## 2021-09-16 NOTE — PROGRESS NOTES
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    Chief Complaint:  Establish care for surveillance    History of Presenting Illness:  Marcela Tiwari is a 45 y.o.  woman of [place] background who presents with a history of Ludwig sarcoma.  She was in her usual states of health till 2006 when she noticed left ankle pain.  The pain was intermittent and persisted for about 6 months.  She was seen by PCP and was initially told that it was due to her bunions.  In 7/2006 she had b/l bunion surgery.  She however continued to have pain.  She eventually had an X-ray which showed abnormalities concerning form bone tumor.  8/10/06 s/p left bone biopsy which was positive for Ludwig sarcoma and negative for t(11;22), t(21;22).   She was diagnosed with T1N0M0, stage IIA disease.  She underwent neoadjuvant chemotherapy with alternating cycle of VDC and ifosfamide and etoposide (VDC/IE).  On 12/6/06 she underwent radical resection of the tumor with cadaver bone and skin graft.  Pathology showed a 5 x 2 cm tumor, with 80% necrosis , 20% viable appearing tumor , high grade and clear margins.  She was continued on chemotherapy given every 2 week and completed 17 cycles in 10/2007.  She was then placed on observation and was getting periodic imaging.  In 3/2010 she developed pain in the left leg on walking.  She developed chronic infection at the surgical site.  Further work up was consistent with osteomyelitis.    She had multiple surgeries for the infection including external fixation.  Eventually she required BKA on 5/31/11.  She was continued on close follow up and imaging.  3/16/12: CT showed no evidence of metastatic disease.  Left inguinal node 1.1 cm which  has decreased from 1.8 cm.  Sclerotic 5 mm bone island right ileum unchanged.  7/24/13 CT CAP showed a small amount of residual thymic tissue.  The tiny 1 mm calcified nodule right lower lobe unchanged, 1 mm nodule left lower lobe stable, 5 mm focal area of sclerosis in the right iliac bone stable, pars defects at L5, clips along the left external iliac chain.  There is a left inguinal node now 9 x 12 mm down from 11 x 17 mm.  6/5/14 CT CAP Stable right lower lobe 1-2 mm calcified nodule, stable 2 mm peribronchial nodule in left lower lobe.  bilateral dominant follicules are present.  Uterus is retroverted.  Left inguinal node was normal and measured 8 x 9 mm.  5 mm bone island right ilium stable.   6/8/15 CT CAP showed no metastatic lesions, hyperexpanded lungs, dependent atelectasis right lower lobe, 8.9 mm hypodensity lower pole left kidney likely a cyst but incompletely characterized, mild bladder wall thickening likely result of incomplete distention, subcentimeter short axis inguinal and retroperitoneal nodes and a1.8 cm right ovarian cyst.     She was seen by our office in 2016 and had repeat staging scans which were negative. She has since been following with her primary physician and returns today to our clinic because she remembered being told that she would need staging scans every 5 years.    Interval History:  Patient is here for consultation. She feels well today. Her biggest issues has been migraines, for which she has started a new medication. She otherwise has no complaints.      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   • Headache, classical migraine        Past Surgical History:   Procedure Laterality Date   • OTHER  2019    sinus surgery   • AMPUTATION, BELOW THE KNEE  2011   • BUNIONECTOMY         Social History     Socioeconomic History   • Marital status: Single     Spouse name: Not on file   • Number of children: Not on file   • Years of education: Not  on file   • Highest education level: Not on file   Occupational History   • Not on file   Tobacco Use   • Smoking status: Never Smoker   • Smokeless tobacco: Never Used   Vaping Use   • Vaping Use: Never used   Substance and Sexual Activity   • Alcohol use: Not Currently     Alcohol/week: 2.0 oz     Types: 4 Glasses of wine per week   • Drug use: No   • Sexual activity: Yes     Partners: Male     Birth control/protection: None   Other Topics Concern   • Not on file   Social History Narrative    Works as a  at the university. Has a 4 year old daughter.      Social Determinants of Health     Financial Resource Strain:    • Difficulty of Paying Living Expenses:    Food Insecurity:    • Worried About Running Out of Food in the Last Year:    • Ran Out of Food in the Last Year:    Transportation Needs:    • Lack of Transportation (Medical):    • Lack of Transportation (Non-Medical):    Physical Activity:    • Days of Exercise per Week:    • Minutes of Exercise per Session:    Stress:    • Feeling of Stress :    Social Connections:    • Frequency of Communication with Friends and Family:    • Frequency of Social Gatherings with Friends and Family:    • Attends [employer] Services:    • Active Member of Clubs or Organizations:    • Attends Club or Organization Meetings:    • Marital Status:    Intimate Partner Violence:    • Fear of Current or Ex-Partner:    • Emotionally Abused:    • Physically Abused:    • Sexually Abused:        Family History   Problem Relation Age of Onset   • Other Mother 48        Parkinson's   • Parkinson's Disease Mother    • Arterial Aneurysm Father         brain   • Cancer Paternal Grandmother         breast cancer       OB History    Para Term  AB Living   1         0   SAB TAB Ectopic Molar Multiple Live Births                    # Outcome Date GA Lbr Isacc/2nd Weight Sex Delivery Anes PTL Lv   1                 Allergies as of 2021 - Reviewed  09/16/2021   Allergen Reaction Noted   • Seasonal Runny Nose 05/14/2015         Current Outpatient Medications:   •  SUMAtriptan (IMITREX) 50 MG Tab, Take 50 mg by mouth one time as needed for Migraine., Disp: , Rfl:   •  Galcanezumab-gnlm 120 MG/ML Solution Auto-injector, Inject 240mg mg under the skin first month, then 120mg every 4 weeks., Disp: 1.12 mL, Rfl: 13  •  Cetirizine HCl (ZYRTEC PO), Take  by mouth., Disp: , Rfl:   •  cyclobenzaprine (FLEXERIL) 5 mg tablet, Take 1-2 Tablets by mouth 2 times a day as needed for Moderate Pain., Disp: 60 Tablet, Rfl: 0  •  sumatriptan (IMITREX) 20 MG/ACT nasal spray, USE 1 SPRAY(S) ONCE DAILY IF HEADACHE RETURNS DOSE MAY BE REPEATED ONCE AFTER 2 HOURS NOT TO EXCEED 40 MG PER DAY FOR 30 DAYS, Disp: 5 Each, Rfl: 2  •  predniSONE (DELTASONE) 5 MG Tab, TAKE 4 TABLETS BY MOUTH AT BREAKFAST(B) LUNCH (L) DINNER(D) ON DAY 1 & 2 THEN 4 TABS AT B 3 TABS AT L & D ON DAY 3 THEN 3 TABS AT B&L 2 TABS AT D ON DAY 4 THEN 2 TABS AT B L&D ON DAY 5 THEN 2 TABS AT B 1 TAB AT L&D ON DAY 6 THEN 1 TAB AT B & D ON DAY 7 (Patient not taking: Reported on 9/16/2021), Disp: , Rfl:     Review of Systems:  Review of Systems   Constitutional: Negative for chills, diaphoresis, fever, malaise/fatigue and weight loss.   HENT: Negative for hearing loss, nosebleeds, sinus pain and sore throat.    Eyes: Negative for blurred vision and double vision.   Respiratory: Negative for cough, sputum production, shortness of breath and wheezing.    Cardiovascular: Negative for chest pain, palpitations, orthopnea and leg swelling.   Gastrointestinal: Negative for abdominal pain, blood in stool, constipation, diarrhea, heartburn, melena, nausea and vomiting.   Genitourinary: Negative for dysuria, frequency, hematuria and urgency.   Musculoskeletal: Positive for back pain (occasional, due to the prosthetic leg causing alignment issues). Negative for joint pain and myalgias.   Skin: Negative for rash.   Neurological:  "Positive for headaches. Negative for dizziness, tingling and weakness.   Endo/Heme/Allergies: Does not bruise/bleed easily.   Psychiatric/Behavioral: The patient is not nervous/anxious and does not have insomnia.           Physical Exam:  Vitals:    09/16/21 0840   BP: (!) 96/58   BP Location: Left arm   Patient Position: Sitting   BP Cuff Size: Adult   Pulse: 88   Resp: 16   Temp: 36.7 °C (98 °F)   TempSrc: Temporal   SpO2: 95%   Weight: 88.3 kg (194 lb 10.7 oz)   Height: 1.778 m (5' 10\")       DESC; KARNOFSKY SCALE WITH ECOG EQUIVALENT: 90, Able to carry on normal activity; minor signs or symptoms of disease (ECOG equivalent 0)    DISTRESS LEVEL: no apparent distress    Physical Exam  Vitals and nursing note reviewed.   Constitutional:       General: She is awake. She is not in acute distress.     Appearance: Normal appearance. She is normal weight. She is not ill-appearing, toxic-appearing or diaphoretic.   HENT:      Head: Normocephalic and atraumatic.      Nose: Nose normal. No congestion.      Mouth/Throat:      Pharynx: Oropharynx is clear. No oropharyngeal exudate or posterior oropharyngeal erythema.   Eyes:      General: No scleral icterus.     Extraocular Movements: Extraocular movements intact.      Conjunctiva/sclera: Conjunctivae normal.      Pupils: Pupils are equal, round, and reactive to light.   Cardiovascular:      Rate and Rhythm: Normal rate and regular rhythm.      Pulses: Normal pulses.      Heart sounds: Normal heart sounds. No murmur heard.   No friction rub. No gallop.    Pulmonary:      Effort: Pulmonary effort is normal.      Breath sounds: Normal breath sounds. No decreased air movement. No wheezing, rhonchi or rales.   Abdominal:      General: Bowel sounds are normal. There is no distension.      Tenderness: There is no abdominal tenderness.   Musculoskeletal:         General: Deformity (LLE BKA with prosthetic leg) present. Normal range of motion.      Cervical back: Normal range of " motion and neck supple. No tenderness.      Right lower leg: No edema.   Lymphadenopathy:      Cervical: No cervical adenopathy.      Upper Body:      Right upper body: No axillary adenopathy.      Left upper body: No axillary adenopathy.      Lower Body: No right inguinal adenopathy. No left inguinal adenopathy.   Skin:     General: Skin is warm and dry.      Coloration: Skin is not jaundiced.      Findings: No erythema or rash.   Neurological:      General: No focal deficit present.      Mental Status: She is alert and oriented to person, place, and time.      Cranial Nerves: Cranial nerves are intact.      Sensory: Sensation is intact.      Motor: Motor function is intact. No weakness.      Gait: Gait is intact.   Psychiatric:         Attention and Perception: Attention normal.         Mood and Affect: Mood normal.         Behavior: Behavior normal. Behavior is cooperative.         Thought Content: Thought content normal.         Judgment: Judgment normal.          Labs:  No visits with results within 7 Day(s) from this visit.   Latest known visit with results is:   Hospital Outpatient Visit on 08/20/2021   Component Date Value Ref Range Status   • TSH 08/20/2021 1.690  0.380 - 5.330 uIU/mL Final    Comment: Reference Range:    Pregnant Females, 1st Trimester  0.050-3.700  Pregnant Females, 2nd Trimester  0.310-4.350  Pregnant Females, 3rd Trimester  0.410-5.180     • Cholesterol,Tot 08/20/2021 171  100 - 199 mg/dL Final   • Triglycerides 08/20/2021 63  0 - 149 mg/dL Final   • HDL 08/20/2021 69  >=40 mg/dL Final   • LDL 08/20/2021 89  <100 mg/dL Final   • Sodium 08/20/2021 139  135 - 145 mmol/L Final   • Potassium 08/20/2021 4.1  3.6 - 5.5 mmol/L Final   • Chloride 08/20/2021 102  96 - 112 mmol/L Final   • Co2 08/20/2021 27  20 - 33 mmol/L Final   • Anion Gap 08/20/2021 10.0  7.0 - 16.0 Final   • Glucose 08/20/2021 95  65 - 99 mg/dL Final   • Bun 08/20/2021 18  8 - 22 mg/dL Final   • Creatinine 08/20/2021 0.74   0.50 - 1.40 mg/dL Final   • Calcium 08/20/2021 9.5  8.5 - 10.5 mg/dL Final   • AST(SGOT) 08/20/2021 14  12 - 45 U/L Final   • ALT(SGPT) 08/20/2021 14  2 - 50 U/L Final   • Alkaline Phosphatase 08/20/2021 66  30 - 99 U/L Final   • Total Bilirubin 08/20/2021 0.3  0.1 - 1.5 mg/dL Final   • Albumin 08/20/2021 4.5  3.2 - 4.9 g/dL Final   • Total Protein 08/20/2021 7.5  6.0 - 8.2 g/dL Final   • Globulin 08/20/2021 3.0  1.9 - 3.5 g/dL Final   • A-G Ratio 08/20/2021 1.5  g/dL Final   • WBC 08/20/2021 5.2  4.8 - 10.8 K/uL Final   • RBC 08/20/2021 4.50  4.20 - 5.40 M/uL Final   • Hemoglobin 08/20/2021 13.9  12.0 - 16.0 g/dL Final   • Hematocrit 08/20/2021 42.5  37.0 - 47.0 % Final   • MCV 08/20/2021 94.4  81.4 - 97.8 fL Final   • MCH 08/20/2021 30.9  27.0 - 33.0 pg Final   • MCHC 08/20/2021 32.7* 33.6 - 35.0 g/dL Final   • RDW 08/20/2021 45.4  35.9 - 50.0 fL Final   • Platelet Count 08/20/2021 247  164 - 446 K/uL Final   • MPV 08/20/2021 10.8  9.0 - 12.9 fL Final   • Neutrophils-Polys 08/20/2021 53.10  44.00 - 72.00 % Final   • Lymphocytes 08/20/2021 33.10  22.00 - 41.00 % Final   • Monocytes 08/20/2021 6.10  0.00 - 13.40 % Final   • Eosinophils 08/20/2021 6.70  0.00 - 6.90 % Final   • Basophils 08/20/2021 0.80  0.00 - 1.80 % Final   • Immature Granulocytes 08/20/2021 0.20  0.00 - 0.90 % Final   • Nucleated RBC 08/20/2021 0.00  /100 WBC Final   • Neutrophils (Absolute) 08/20/2021 2.77  2.00 - 7.15 K/uL Final    Includes immature neutrophils, if present.   • Lymphs (Absolute) 08/20/2021 1.73  1.00 - 4.80 K/uL Final   • Monos (Absolute) 08/20/2021 0.32  0.00 - 0.85 K/uL Final   • Eos (Absolute) 08/20/2021 0.35  0.00 - 0.51 K/uL Final   • Baso (Absolute) 08/20/2021 0.04  0.00 - 0.12 K/uL Final   • Immature Granulocytes (abs) 08/20/2021 0.01  0.00 - 0.11 K/uL Final   • NRBC (Absolute) 08/20/2021 0.00  K/uL Final   • Fasting Status 08/20/2021 Fasting   Final   • GFR If  08/20/2021 >60  >60 mL/min/1.73 m 2 Final   •  GFR If Non  08/20/2021 >60  >60 mL/min/1.73 m 2 Final       Imaging:   All listed images below have been independently reviewed by me. I agree with the findings as summarized below:    CT-ABDOMEN-PELVIS WITH    Result Date: 9/9/2021 9/9/2021 9:42 AM HISTORY/REASON FOR EXAM:  Abdominal pain, acute, nonlocalized. Ludwig's sarcoma TECHNIQUE/EXAM DESCRIPTION:   CT scan of the abdomen and pelvis with contrast. Contrast-enhanced helical scanning was obtained from the diaphragmatic domes through the pubic symphysis following the bolus administration of nonionic contrast without complication. 100 mL of Omnipaque 350 nonionic contrast was administered without complication. Low dose optimization technique was utilized for this CT exam including automated exposure control and adjustment of the mA and/or kV according to patient size. COMPARISON: 6/16/1960. FINDINGS: Lower Chest: No consolidation or effusion. Stable slight pericardial thickening. Liver: Normal. Spleen: Unremarkable. Pancreas: Normal.  No duct dilation or adjacent fat stranding. . Gallbladder: No calcified stones. Biliary: Nondilated. Adrenal glands: Normal. Kidneys: No significant enlargement of left lower pole renal cortex hypodense mass which measures 8 mm short axis, consistent with a cyst and no specific follow-up is required.  No hydronephrosis. Bowel: No obstruction or acute inflammation. A segment of normal caliber gas filled appendix is visualized Lymph nodes: No adenopathy. Vasculature: No aneurysm. . Peritoneum: No ascites or mass. Left rectus abdominis muscle atrophy again noted. Pelvis: No adenopathy or free fluid. Normal configuration of the bladder. Chronic L5 pars interarticularis defects Normal size uterus and no ovarian enlargement     1.  No CT evidence of acute inflammatory process or metastasis 2.  Stable chronic findings including L5 pars interarticularis defects, left renal cyst, left rectus abdominis muscle fatty atrophy        Pathology:    1. 8/10/06: Bone, left tibia  biopsy  a. Small cell malignant neoplasm: Ludwig sarcoma  b. Negative for t(11;22), t(21;22)  2. 12/6/06: Left tibia:  a. Bone: No tumor at distal margin  b. Bone: No tumor at proximal margin  c. Soft tissue, later biopsy site excision: no tumor seen  d. 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   i. Approximately 80% necrosis   ii. 20% viable appearing tumor   iii. No definite tumor at margins of excision  iv. No definite angiolymphatic space invasion     Assessment & Plan:  1. 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 She has been under surveillance for 15 years with no recurrence of disease.    At this time, since she ultimately required a below-knee amputation of her left lower extremity due to osteomyelitis, there is no need for imaging for surveillance at the local site left leg.  However, she should still get radiographic annual surveillance of her chest due to the potential for pulmonary metastasis.  As such, I have ordered a chest x-ray for her to be completed.  We will continue to monitor her her annually with chest x-rays for surveillance.    She also has an issue with irregular menstrual bleeding, which can sometimes be heavy.  Her blood counts have been unremarkable to this time, but she will need to continue to monitor this for risk of developing iron deficiency anemia.  We also discussed how the chemotherapy she received, particularly doxorubicin, can have long-term side effects including the potential for acute leukemia, and therefore annual blood work would be important for surveillance.    She is otherwise doing well and we will be  happy to see her again in 1 year.    1. Obtain CXR for disease surveillance for potential lung metastases.  2. Monitor CBC for intermittent heavy menstrual bleeding and potential for development of iron deficiency.  3. Monitor CMP for routine surveillance.  4. Return to clinic in 1 year for annual follow up.    Any questions and concerns raised by the patient were answered to the best of my ability. Thank you for allowing me to participate in the care for this patient. Please feel free to contact me for any questions or concerns.     Total time spent on chart review, clinic encounter, and documentation: 48 minutes.

## 2021-10-01 DIAGNOSIS — G43.109 MIGRAINE WITH AURA AND WITHOUT STATUS MIGRAINOSUS, NOT INTRACTABLE: Primary | ICD-10-CM

## 2021-10-01 RX ORDER — SUMATRIPTAN 50 MG/1
TABLET, FILM COATED ORAL
Qty: 9 TABLET | Refills: 3 | Status: SHIPPED | OUTPATIENT
Start: 2021-10-01 | End: 2022-03-18 | Stop reason: SDUPTHER

## 2022-02-14 NOTE — PROGRESS NOTES
[facility] ED  eMERGENCY dEPARTMENT eNCOUnter      Pt Name: Nahomy Simpson  MRN: [id number]  [place] 1957  Date of evaluation: 2/14/2022  Provider: Nury Rogers PA-C    CHIEF COMPLAINT       Chief Complaint   Patient presents with    Flank Pain     right side and ABD         HISTORY OF PRESENT ILLNESS   (Location/Symptom, Timing/Onset,Context/Setting, Quality, Duration, Modifying Factors, Severity)  Note limiting factors. Nahomy Simpson is a 63 y.o. female who presents to the emergency department with complaint of right-sided flank pain which patient states started approximately 1 hour ago, she states it was high in the right flank, is now migrating to the lower portion of the right flank, along with nausea vomiting. Patient states similar presentation in the past with kidney stones, she states last stone was approximately 5 years ago. Denies any urinary complaints, no hematuria. She rates her current pain is a 10 out of 10 at this time. Past medical history significant for hyperlipidemia, hypothyroidism kidney stones. HPI    NursingNotes were reviewed. REVIEW OF SYSTEMS    (2-9 systems for level 4, 10 or more for level 5)     Review of Systems   Constitutional: Negative for activity change and appetite change. HENT: Negative for congestion, ear discharge, ear pain, nosebleeds, rhinorrhea and sore throat. Eyes: Negative for discharge. Respiratory: Negative for shortness of breath. Cardiovascular: Negative for chest pain, palpitations and leg swelling. Gastrointestinal: Positive for abdominal pain and nausea. Negative for abdominal distention, constipation and vomiting. Genitourinary: Positive for flank pain. Negative for difficulty urinating and dysuria. Musculoskeletal: Positive for back pain. Negative for arthralgias, myalgias, neck pain and neck stiffness. Skin: Negative for color change. Neurological: Negative for dizziness, syncope, numbness and headaches. A&Ox4, pleasant. Denies pain at this time. Fundus firm, lochia light  Bilateral breast firm, no colostrum present with hand expression. Will pump @ 0900. Breast pump at bedside. Voiding adequately. Education done on infant care and breastfeeding. This RN attempted to assist in latch, infant uninterested. Will continue to monitor.    Psychiatric/Behavioral: Negative for agitation and confusion. Except as noted above the remainder of the review of systems was reviewed and negative. PAST MEDICAL HISTORY     Past Medical History:   Diagnosis Date    Cancer [facility]) [place]    CERVICAL    Fatty liver DX 2008 VIA BIOPSY    History of herpes simplex infection     History of mammography, screening 2011    STABLE BREASTS/CAT 2    Hyperlipidemia 6/22/2013    Hypothyroidism          SURGICALHISTORY       Past Surgical History:   Procedure Laterality Date    CHOLECYSTECTOMY  1995    COLON SURGERY  2003    RESECTION/ FROM RADIATION    COLONOSCOPY  2011-JULY    NORMAL    HERNIA REPAIR  2005    HERNIA REPAIR  2002         CURRENT MEDICATIONS       Discharge Medication List as of 2/14/2022 12:44 PM      CONTINUE these medications which have NOT CHANGED    Details   levothyroxine (SYNTHROID) 100 MCG tablet take 1 tablet by mouth once daily, Disp-30 tablet, R-5Normal      SYRINGE-NEEDLE, DISP, 3 ML (B-D 3CC LUER-NEIL SYR 25GX1\") 25G X 1\" 3 ML MISC Disp-1 each, R-5, NormalUSE TO INJECT B12 ONCE A month      hydroCHLOROthiazide (MICROZIDE) 12.5 MG capsule Take 1 capsule by mouth daily, Disp-30 capsule, R-5Normal      Ibuprofen (ADVIL PO) Take by mouthHistorical Med      cyanocobalamin 1000 MCG/ML injection INJECT 1 ML AS DIRECTED ONCE A WEEK., Disp-4 mL, R-3Normal      calcium-vitamin D (OSCAL-500) 500-200 MG-UNIT per tablet Take 1 tablet by mouth dailyHistorical Med      diphenoxylate-atropine (LOMOTIL) 2.5-0.025 MG per tablet Take 1-2 tablets by mouth as needed.  Historical Med      atenolol (TENORMIN) 25 MG tablet take 1 tablet by mouth once daily, R-0Historical Med      ondansetron (ZOFRAN) 4 MG tablet Take 1 tablet by mouth every 8 hours as needed for Nausea or Vomiting, Disp-21 tablet, R-3Normal      diclofenac sodium 1 % GEL Apply 2 g topically 2 times daily, Topical, 2 TIMES DAILY Starting Wed 6/20/2018, Disp-1 Tube, R-3, Normal fluticasone (FLONASE) 50 MCG/ACT nasal spray 1 spray by Nasal route daily, Disp-1 Bottle, R-3Normal      nitroGLYCERIN (NITROSTAT) 0.4 MG SL tablet PLACE 1 TABLET UNDER THE TONGUE EVERY FIVE MINUTES FOR UP TO 3 DO. ..  (REFER TO PRESCRIPTION NOTES). , R-0Historical Med             ALLERGIES     Darvon [propoxyphene hcl], Dye [iodides], and Metoprolol    FAMILY HISTORY       Family History   Problem Relation Age of Onset    Diabetes Other     Heart Disease Mother     Heart Surgery Mother     Thyroid Disease Mother     Thyroid Disease Sister     Breast Cancer Sister           SOCIAL HISTORY       Social History     Socioeconomic History    Marital status:      Spouse name: Not on file    Number of children: Not on file    Years of education: Not on file    Highest education level: Not on file   Occupational History    Not on file   Tobacco Use    Smoking status: Former Smoker     Packs/day: 0.50     Years: 5.00     Pack years: 2.50     Types: Cigarettes     Quit date: 10/15/1983     Years since quittin.3    Smokeless tobacco: Never Used   Substance and Sexual Activity    Alcohol use: No    Drug use: No    Sexual activity: Yes     Partners: Male     Comment:    Other Topics Concern    Not on file   Social History Narrative    Lives w      Social Determinants of Health     Financial Resource Strain: Low Risk     Difficulty of Paying Living Expenses: Not hard at all   Food Insecurity: No Food Insecurity    Worried About Running Out of Food in the Last Year: Never true    [address] in the Last Year: Never true   Transportation Needs:     Lack of Transportation (Medical): Not on file    Lack of Transportation (Non-Medical):  Not on file   Physical Activity:     Days of Exercise per Week: Not on file    Minutes of Exercise per Session: Not on file   Stress:     Feeling of Stress : Not on file   Social Connections:     Frequency of Communication with Friends and Family: Not on file    Frequency of Social Gatherings with Friends and Family: Not on file    Attends Jehovah's witness Services: Not on file    Active Member of Clubs or Organizations: Not on file    Attends Club or Organization Meetings: Not on file    Marital Status: Not on file   Intimate Partner Violence:     Fear of Current or Ex-Partner: Not on file    Emotionally Abused: Not on file    Physically Abused: Not on file    Sexually Abused: Not on file   Housing Stability:     Unable to Pay for Housing in the Last Year: Not on file    Number of [place] in the Last Year: Not on file    Unstable Housing in the Last Year: Not on file       SCREENINGS      @FLOW([id number])@      PHYSICAL EXAM    (up to 7 for level 4, 8 or more for level 5)     ED Triage Vitals [02/14/22 1024]   BP Temp Temp Source Pulse Resp SpO2 Height Weight   (!) 162/86 98 °F (36.7 °C) Oral 62 18 99 % -- 170 lb (77.1 kg)       Physical Exam  Vitals and nursing note reviewed. Constitutional:       General: She is not in acute distress. Appearance: She is well-developed. She is not ill-appearing, toxic-appearing or diaphoretic. Comments: Patient visibly uncomfortable, writhing around in bed. HENT:      Head: Normocephalic. Nose: No congestion. Mouth/Throat:      Mouth: Mucous membranes are moist.      Pharynx: No oropharyngeal exudate or posterior oropharyngeal erythema. Eyes:      Extraocular Movements: Extraocular movements intact. Conjunctiva/sclera: Conjunctivae normal.      Pupils: Pupils are equal, round, and reactive to light. Neck:      Vascular: No JVD. Trachea: No tracheal deviation. Cardiovascular:      Rate and Rhythm: Normal rate. Pulses: Normal pulses. Heart sounds: Normal heart sounds. No murmur heard. No friction rub. No gallop. Pulmonary:      Effort: Pulmonary effort is normal. No tachypnea, accessory muscle usage, respiratory distress or retractions.       Breath sounds: No stridor. No wheezing, rhonchi or rales. Chest:      Chest wall: No tenderness. Abdominal:      General: Abdomen is flat. Bowel sounds are normal. There is no distension or abdominal bruit. Palpations: Abdomen is soft. There is no shifting dullness, fluid wave, hepatomegaly, splenomegaly, mass or pulsatile mass. Tenderness: There is no abdominal tenderness. There is no right CVA tenderness, left CVA tenderness, guarding or rebound. Negative signs include Mcmahon's sign, Rovsing's sign and McBurney's sign. Comments: Abdomen soft nondistended nontender no guarding mass rebound, no CVA tenderness. Musculoskeletal:         General: No deformity. Cervical back: Normal range of motion and neck supple. No rigidity. Skin:     General: Skin is warm and dry. Capillary Refill: Capillary refill takes less than 2 seconds. Coloration: Skin is not jaundiced. Neurological:      General: No focal deficit present. Mental Status: She is alert and oriented to person, place, and time. Mental status is at baseline. Cranial Nerves: No cranial nerve deficit. Sensory: No sensory deficit. Motor: No weakness. Coordination: Coordination normal.   Psychiatric:         Mood and Affect: Mood normal.         DIAGNOSTIC RESULTS     EKG: All EKG's are interpreted by the Emergency Department Physician who either signs or Co-signsthis chart in the absence of a cardiologist.        RADIOLOGY:   [facility] such as CT, Ultrasound and MRI are read by the radiologist. Plain radiographic images are visualized and preliminarily interpreted by the emergency physician with the below findings:        Interpretation per the Radiologist below, if available at the time ofthis note:    CT ABDOMEN PELVIS WO CONTRAST Additional Contrast? None   Final Result   1. MILD RIGHT PERINEPHRIC STRANDING.  THERE IS MODERATE RIGHT-SIDED HYDRONEPHROSIS AND HYDROURETER. NO OBSTRUCTING CALCULI. COULD REPRESENT RECENTLY PASSED STONE OR UNDERLYING INFECTION. SIMILAR TO STUDY OF JULY 26, 2021. RECOMMEND FOLLOW-UP UROLOGY AND    CT UROGRAM TO FURTHER EVALUATE   2. NONOBSTRUCTING LEFT RENAL CALCULI. All CT scans at this facility use dose modulation, iterative reconstruction, and/or weight based dosing when appropriate to reduce radiation dose to as low as reasonably achievable. ED BEDSIDE ULTRASOUND:   Performed by ED Physician - none    LABS:  Labs Reviewed   COMPREHENSIVE METABOLIC PANEL - Abnormal; Notable for the following components:       Result Value    Glucose 113 (*)     CREATININE 1.03 (*)     GFR Non- 54.1 (*)     Total Bilirubin 1.6 (*)     ALT 66 (*)     AST 50 (*)     All other components within normal limits   LIPASE - Abnormal; Notable for the following components:    Lipase 182 (*)     All other components within normal limits   URINE RT REFLEX TO CULTURE - Abnormal; Notable for the following components:    Blood, Urine TRACE (*)     Leukocyte Esterase, Urine SMALL (*)     All other components within normal limits   MICROSCOPIC URINALYSIS - Abnormal; Notable for the following components:    Crystals, UA 1+ Ca. Oxalate (*)     All other components within normal limits   CBC WITH AUTO DIFFERENTIAL   LACTIC ACID, PLASMA       All other labs were within normal range or not returned as of this dictation.     EMERGENCY DEPARTMENT COURSE and DIFFERENTIAL DIAGNOSIS/MDM:   Vitals:    Vitals:    02/14/22 1024 02/14/22 1226 02/14/22 1305   BP: (!) 162/86 (!) 173/83 (!) 162/83   Pulse: 62 61 64   Resp: 18 16 16   Temp: 98 °F (36.7 °C)     TempSrc: Oral     SpO2: 99% 100% 99%   Weight: 170 lb (77.1 kg)              MDM  Number of Diagnoses or Management Options  Nephrolithiasis  Right flank pain  Diagnosis management comments: Patient present ED with complaint of right-sided flank pain which started approximately 1 hour ago, she states is somewhat to previous pain she has had with kidney stones in the past.  She complains of mild nausea, no urine complaints, no hematuria. CT scan of abdomen pelvis shows some mild hydronephrosis to the right side but no signs of obstructive uropathy. Suspected patient had passed a kidney stone, she was given IV fluids, pain medication while in ED, she is more comfortable at this time, she was sent home with a prescription for Percocet, Zofran for nausea, advised to return to the ED if she has any worsening or changes symptoms. She was also given referral to urology. She was advised to contact her regular family physician for follow-up in the next 2 to 3 days. CRITICAL CARE TIME   Total Critical Care time was  minutes, excluding separately reportableprocedures. There was a high probability of clinicallysignificant/life threatening deterioration in the patient's condition which required my urgent intervention. CONSULTS:  None    PROCEDURES:  Unless otherwise noted below, none     Procedures    FINAL IMPRESSION      1. Right flank pain    2. Nephrolithiasis          DISPOSITION/PLAN   DISPOSITION Decision To Discharge 02/14/2022 12:41:47 PM      PATIENT REFERRED TO:  EDILSON Cordova - CNP  [address], [address]  [place] [address]  [phone]    In 2 days      Bee Peter MD  [address]  [phone]    In 3 days        DISCHARGE MEDICATIONS:  Discharge Medication List as of 2/14/2022 12:44 PM      START taking these medications    Details   oxyCODONE-acetaminophen (PERCOCET) 5-325 MG per tablet Take 1 tablet by mouth every 6 hours as needed for Pain for up to 3 days. Intended supply: 3 days.  Take lowest dose possible to manage pain, Disp-12 tablet, R-0Print      ondansetron (ZOFRAN ODT) 4 MG disintegrating tablet Take 1 tablet by mouth every 8 hours as needed for Nausea, Disp-12 tablet, R-0Print                (Please note that portions of this note were completed with a voice recognition program.  Efforts were made to edit the dictations but occasionally words are mis-transcribed.)    Lakeisha Anguiano PA-C (electronically signed)  Attending Emergency Physician         Lakeisha Anguiano PA-C  02/14/22 [address] Juana Will PA-C  02/14/22 [id number]

## 2022-02-22 ENCOUNTER — PATIENT MESSAGE (OUTPATIENT)
Dept: NEUROLOGY | Facility: MEDICAL CENTER | Age: 47
End: 2022-02-22
Payer: COMMERCIAL

## 2022-03-18 ENCOUNTER — TELEMEDICINE (OUTPATIENT)
Dept: NEUROLOGY | Facility: MEDICAL CENTER | Age: 47
End: 2022-03-18
Attending: PSYCHIATRY & NEUROLOGY
Payer: COMMERCIAL

## 2022-03-18 VITALS
HEIGHT: 71 IN | HEART RATE: 80 BPM | DIASTOLIC BLOOD PRESSURE: 60 MMHG | SYSTOLIC BLOOD PRESSURE: 119 MMHG | RESPIRATION RATE: 16 BRPM | WEIGHT: 190 LBS | BODY MASS INDEX: 26.6 KG/M2

## 2022-03-18 DIAGNOSIS — G43.109 MIGRAINE WITH AURA AND WITHOUT STATUS MIGRAINOSUS, NOT INTRACTABLE: Primary | ICD-10-CM

## 2022-03-18 PROCEDURE — 99214 OFFICE O/P EST MOD 30 MIN: CPT | Mod: 95 | Performed by: PSYCHIATRY & NEUROLOGY

## 2022-03-18 RX ORDER — DEXAMETHASONE 2 MG/1
TABLET ORAL
Qty: 10 TABLET | Refills: 0 | Status: SHIPPED | OUTPATIENT
Start: 2022-03-18 | End: 2022-03-22

## 2022-03-18 RX ORDER — SUMATRIPTAN 50 MG/1
TABLET, FILM COATED ORAL
Qty: 9 TABLET | Refills: 11 | Status: SHIPPED | OUTPATIENT
Start: 2022-03-18 | End: 2022-03-31

## 2022-03-18 RX ORDER — SUMATRIPTAN 20 MG/1
SPRAY NASAL
Qty: 5 EACH | Refills: 2 | Status: SHIPPED | OUTPATIENT
Start: 2022-03-18 | End: 2022-03-31

## 2022-03-18 ASSESSMENT — FIBROSIS 4 INDEX: FIB4 SCORE: 0.67

## 2022-03-18 ASSESSMENT — PATIENT HEALTH QUESTIONNAIRE - PHQ9: CLINICAL INTERPRETATION OF PHQ2 SCORE: 0

## 2022-03-18 ASSESSMENT — PAIN SCALES - GENERAL: PAINLEVEL: 7=MODERATE-SEVERE PAIN

## 2022-03-18 NOTE — PROGRESS NOTES
"[facility] NEUROLOGY  GENERAL NEUROLOGY  FOLLOW-UP VISIT    This evaluation was conducted via Zoom using secure and encrypted videoconferencing technology. The patient was in their home in the state of [place].    The patient's identity was confirmed and verbal consent was obtained for this virtual visit.    CC: migraine    INTERVAL HISTORY:  Marcela Tiwari is a 46 y.o. woman with migraines as well as a history of migraine, Ludwig's sarcoma (diagnosed 2006), and s/p BKA (2011).  I last saw her in the clinic on 9/2/2021.  At that time I recommended proceeding with Emgality and a trial of Nurtec.  Today, I followed up with her via Zoom, and she provided the following interval history:    The following is a summary of headache symptoms, presented in my standard format:    Family History: father had headaches  Age at onset: 27-28  Location: variable, if due to stress: shoulders with radiation to the occiput and temples  Radiation: see above  Frequency: 2-3 days/week  Duration: without treatment: hasn't tried this, with treatment: 1 hours  Headache Days/Month: variable, pain tends to occur in \"clusters\"  Quality: feels like her head is \"about to explode\" \"pressure\"  Intensity: 10/10  Aura: some discomfort in the posterior cervical area  Photophobia/Phonophobia/Nausea/Vomiting: yes/yes/yes/once  Provoked by Physical Activity?:   Triggers: teaching passionately, alcohol  Associated Symptoms:   Autonomic Signs (such as ptosis, miosis, conjunctival injection, rhinorrhea, increased lacrimation):   Head Trauma:   Association with Menses:   ED Visits:   Hospitalizations:   Missed Work Days ( at [facility]):   Sleep: 9 hours  Caffeine Intake: 1 per morning  Hydration: keeps well hydrated  Nutrition: tries not to skip meals  Exercise:   Analgesic Overuse:     Current Medication Regimen:  - Emgality: very effective for 6 months, had a \"cluster\" at the end of 2/2022  - sumatriptan: 50 mg, effective  - sumatriptan: " "20 mg IN, very effective    Medications Tried: Response  Preventive:  - amitriptyline: 10 mg caused her to feel \"very weird in the morning\"    Abortive:  - Excedrin  - Nurtec: tried this in September, doesn't recall experience    Medications Not Tried:  - topiramate  - propranolol    MEDICATIONS:  Current Outpatient Medications   Medication Sig   • SUMAtriptan (IMITREX) 50 MG Tab Take  mg at the onset of aura/HA; may re-dose x1 after 2 hrs if HA persists; MDD: 200 mg; do not use >2 days/week.   • predniSONE (DELTASONE) 5 MG Tab TAKE 4 TABLETS BY MOUTH AT BREAKFAST(B) LUNCH (L) DINNER(D) ON DAY 1 & 2 THEN 4 TABS AT B 3 TABS AT L & D ON DAY 3 THEN 3 TABS AT B&L 2 TABS AT D ON DAY 4 THEN 2 TABS AT B L&D ON DAY 5 THEN 2 TABS AT B 1 TAB AT L&D ON DAY 6 THEN 1 TAB AT B & D ON DAY 7 (Patient not taking: Reported on 9/16/2021)   • Galcanezumab-gnlm 120 MG/ML Solution Auto-injector Inject 240mg mg under the skin first month, then 120mg every 4 weeks.   • Cetirizine HCl (ZYRTEC PO) Take  by mouth.   • cyclobenzaprine (FLEXERIL) 5 mg tablet Take 1-2 Tablets by mouth 2 times a day as needed for Moderate Pain.   • sumatriptan (IMITREX) 20 MG/ACT nasal spray USE 1 SPRAY(S) ONCE DAILY IF HEADACHE RETURNS DOSE MAY BE REPEATED ONCE AFTER 2 HOURS NOT TO EXCEED 40 MG PER DAY FOR 30 DAYS     MEDICAL, SOCIAL, AND FAMILY HISTORY:  There is no change in the patient's ROS or medical, social, or family histories since the previous visit on 9/2/2021.    REVIEW OF SYSTEMS:  A ROS was completed.  Pertinent positives and negatives were included in the HPI, above.  All other systems were reviewed and are negative.    PHYSICAL EXAM:  General/Medical:  - NAD    Neuro:  MENTAL STATUS: awake and alert; no deficits of speech or language; oriented to person, place, and time; affect was appropriate to situation; pleasant, cooperative    CRANIAL NERVES:    II: acuity: NT, fields: NT, pupils: NT, discs: NT    III/IV/VI: versions: grossly intact    " V: facial sensation: NT    VII: facial expression: symmetric    VIII: hearing: intact to voice    IX/X: palate: NT    XI: shoulder shrug: NT    XII: tongue: NT    MOTOR:  - bulk: NT  - tone: NT  Upper Extremity Strength  (R/L)    NT   Elbow flexion NT   Elbow extension NT   Shoulder abduction NT     Lower Extremity Strength  (R/L)   Hip flexion NT   Knee extension NT   Knee flexion NT   Ankle plantarflexion NT   Ankle dorsiflexion NT     - pronator drift: NT  - abnormal movements: none    SENSATION:  - light touch: NT  - vibration (R/L, seconds): NT at the great toes  - pinprick: NT  - proprioception: NT  - Romberg: absent    COORDINATION:  - finger to nose: NT  - finger tapping: NT    REFLEXES:  Reflex Right Left   BR NT NT   Biceps NT NT   Triceps NT NT   Patellae NT NT   Achilles NT NT   Toes NT NT     GAIT:  - NT    REVIEW OF IMAGING STUDIES:  No additional images since the last visit.    REVIEW OF LABORATORY STUDIES:  No additional data since the last visit.    ASSESSMENT:  Marcela Tiwari is a 45 y.o. woman with chronic migraine.  Plans/recommendations as follows:    PLAN:  Status Migrainosus:  - dexamethasone taper    Chronic Migraine:  Prevention:  - continue Emgality  - if headaches remain frequent, could try Botox  - try supplementing:   - magnesium: 400-600 mg once or 200-300 mg twice daily   - riboflavin (vitamin B2): 400 mg once daily   - coenzyme Q10: 300 mg daily  - get 7-9 hours of sleep per night  - drink plenty of fluids (urine should be nearly clear)  - avoid excessive caffeine intake (no more than 2 servings per day and nothing in the afternoon)  - eat regular meals (don't skip meals)  - get moderate exercise (even just a 20 minute walk daily)    :  - continue sumatriptan 50 mg PO: take this at the onset of headache pain; may re-dose x1 after 2 hours if headache persists; MDD: 200 mg/24 hours do not use more than 2 days/week OR  - continue sumatriptan 20 mg IN: take this at  the onset of headache; may re-dose x1 after 2 hours if headache persists; MDD: 40 mg/24 hours  - do not use analgesics (e.g., ibuprofen, acetaminophen) more than 2 days per week in order to avoid analgesic rebound headaches    - keep a headache log    Follow-Up:  - No follow-ups on file.    Signed: Caesar Vigil M.D.

## 2022-03-31 ENCOUNTER — OFFICE VISIT (OUTPATIENT)
Dept: NEUROLOGY | Facility: MEDICAL CENTER | Age: 47
End: 2022-03-31
Attending: PSYCHIATRY & NEUROLOGY
Payer: COMMERCIAL

## 2022-03-31 VITALS
DIASTOLIC BLOOD PRESSURE: 82 MMHG | OXYGEN SATURATION: 94 % | WEIGHT: 190 LBS | BODY MASS INDEX: 27.2 KG/M2 | HEIGHT: 70 IN | SYSTOLIC BLOOD PRESSURE: 126 MMHG | HEART RATE: 88 BPM

## 2022-03-31 DIAGNOSIS — G43.109 MIGRAINE WITH AURA AND WITHOUT STATUS MIGRAINOSUS, NOT INTRACTABLE: ICD-10-CM

## 2022-03-31 DIAGNOSIS — G43.E09 CHRONIC MIGRAINE WITH AURA: Primary | ICD-10-CM

## 2022-03-31 PROCEDURE — 700111 HCHG RX REV CODE 636 W/ 250 OVERRIDE (IP): Performed by: PSYCHIATRY & NEUROLOGY

## 2022-03-31 PROCEDURE — 64615 CHEMODENERV MUSC MIGRAINE: CPT | Performed by: PSYCHIATRY & NEUROLOGY

## 2022-03-31 RX ORDER — RIMEGEPANT SULFATE 75 MG/75MG
75 TABLET, ORALLY DISINTEGRATING ORAL
Qty: 8 TABLET | Refills: 5 | Status: SHIPPED | OUTPATIENT
Start: 2022-03-31 | End: 2022-04-30

## 2022-03-31 RX ADMIN — ONABOTULINUMTOXINA 155 UNITS: 200 INJECTION, POWDER, LYOPHILIZED, FOR SOLUTION INTRADERMAL; INTRAMUSCULAR at 16:33

## 2022-03-31 ASSESSMENT — FIBROSIS 4 INDEX: FIB4 SCORE: 0.67

## 2022-03-31 NOTE — PROGRESS NOTES
[facility]  BOTOX PROCEDURE NOTE    Chronic Migraine:  Botox therapy has reduced patient’s migraines by more than 7 days and/or 100 hours per month.     I treated Marcela Tiwari in clinic today with BotoxA 155 units according to the dosing/injection paradigm currently mandated by the FDA for the management of chronic migraine.  Specifically, I injected:  - 5 units to the procerus,  - 5 units to the corrugators (bilaterally),  - a total of 20 units to the frontalis musculature,  - 20 units to the temporalis (bilaterally),  - 15 units to the occipitalis (bilaterally),  - 10 units to the cervical paraspinals (bilaterally), and  - 15 units to the trapezius musculature (bilaterally).    The remainder of the Botox was discarded as wastage per FDA guidelines  Consent on file.  Patient identify verified with 2 patient identifiers.     Frequency of headaches is >15 days monthly with at least 8 migraines monthly.    Migraines include at least two of the following: worsened with activity or avoidance of activity with migraines (ie they go lie down), moderate to severe pain intensity, pulsing headache, unilateral headache and has  have either nausea or vomiting OR sensitivity to light and sound.     This is the first series of Botox injections.  I recommend that Botox be continued at this time.    Marcela Tiwari has chronic migraines, defined as having 15 or more headaches days per month, 8 of which are migraines, over a minimum of the last three months.  Episodes last more than 4 hours (untreated).  Pt has 2 or more of following (see initial note):  - headache worsened with activity  - pain is moderate to severe intensity  - pulsing in nature  - unilateral   and patient either has nausea/vomiting OR sensitivity to light and sound.    No adverse effect of Botox noted at conclusion of today's appointment.    Follow up in 12 weeks for Botox or sooner if needed.    Signed: Caesar Vigil M.D.

## 2022-04-05 DIAGNOSIS — G43.109 MIGRAINE WITH AURA AND WITHOUT STATUS MIGRAINOSUS, NOT INTRACTABLE: ICD-10-CM

## 2022-04-07 ENCOUNTER — TELEPHONE (OUTPATIENT)
Dept: NEUROLOGY | Facility: MEDICAL CENTER | Age: 47
End: 2022-04-07

## 2022-04-07 NOTE — TELEPHONE ENCOUNTER
NURTEC 75mg Dispersible Tablet    PA submitted via CMM (Key: [id number]) awaiting response TAT 24-72 hrs. - 04/07/2022 8:31am

## 2022-05-03 ENCOUNTER — HOSPITAL ENCOUNTER (OUTPATIENT)
Dept: LAB | Facility: MEDICAL CENTER | Age: 47
End: 2022-05-03
Attending: FAMILY MEDICINE
Payer: COMMERCIAL

## 2022-05-03 LAB
ALBUMIN SERPL BCP-MCNC: 4.6 G/DL (ref 3.2–4.9)
ALBUMIN/GLOB SERPL: 1.9 G/DL
ALP SERPL-CCNC: 60 U/L (ref 30–99)
ALT SERPL-CCNC: 11 U/L (ref 2–50)
ANION GAP SERPL CALC-SCNC: 9 MMOL/L (ref 7–16)
AST SERPL-CCNC: 13 U/L (ref 12–45)
BASOPHILS # BLD AUTO: 0.8 % (ref 0–1.8)
BASOPHILS # BLD: 0.04 K/UL (ref 0–0.12)
BILIRUB SERPL-MCNC: 0.3 MG/DL (ref 0.1–1.5)
BUN SERPL-MCNC: 20 MG/DL (ref 8–22)
CALCIUM SERPL-MCNC: 9.3 MG/DL (ref 8.5–10.5)
CHLORIDE SERPL-SCNC: 103 MMOL/L (ref 96–112)
CHOLEST SERPL-MCNC: 185 MG/DL (ref 100–199)
CO2 SERPL-SCNC: 27 MMOL/L (ref 20–33)
CREAT SERPL-MCNC: 0.62 MG/DL (ref 0.5–1.4)
EOSINOPHIL # BLD AUTO: 0.28 K/UL (ref 0–0.51)
EOSINOPHIL NFR BLD: 5.4 % (ref 0–6.9)
ERYTHROCYTE [DISTWIDTH] IN BLOOD BY AUTOMATED COUNT: 43.7 FL (ref 35.9–50)
FASTING STATUS PATIENT QL REPORTED: NORMAL
GFR SERPLBLD CREATININE-BSD FMLA CKD-EPI: 112 ML/MIN/1.73 M 2
GLOBULIN SER CALC-MCNC: 2.4 G/DL (ref 1.9–3.5)
GLUCOSE SERPL-MCNC: 92 MG/DL (ref 65–99)
HCT VFR BLD AUTO: 41 % (ref 37–47)
HDLC SERPL-MCNC: 68 MG/DL
HGB BLD-MCNC: 13.4 G/DL (ref 12–16)
IMM GRANULOCYTES # BLD AUTO: 0.01 K/UL (ref 0–0.11)
IMM GRANULOCYTES NFR BLD AUTO: 0.2 % (ref 0–0.9)
LDLC SERPL CALC-MCNC: 105 MG/DL
LYMPHOCYTES # BLD AUTO: 1.89 K/UL (ref 1–4.8)
LYMPHOCYTES NFR BLD: 36.2 % (ref 22–41)
MCH RBC QN AUTO: 30.7 PG (ref 27–33)
MCHC RBC AUTO-ENTMCNC: 32.7 G/DL (ref 33.6–35)
MCV RBC AUTO: 93.8 FL (ref 81.4–97.8)
MONOCYTES # BLD AUTO: 0.36 K/UL (ref 0–0.85)
MONOCYTES NFR BLD AUTO: 6.9 % (ref 0–13.4)
NEUTROPHILS # BLD AUTO: 2.64 K/UL (ref 2–7.15)
NEUTROPHILS NFR BLD: 50.5 % (ref 44–72)
NRBC # BLD AUTO: 0 K/UL
NRBC BLD-RTO: 0 /100 WBC
PLATELET # BLD AUTO: 227 K/UL (ref 164–446)
PMV BLD AUTO: 10.2 FL (ref 9–12.9)
POTASSIUM SERPL-SCNC: 4 MMOL/L (ref 3.6–5.5)
PROT SERPL-MCNC: 7 G/DL (ref 6–8.2)
RBC # BLD AUTO: 4.37 M/UL (ref 4.2–5.4)
SODIUM SERPL-SCNC: 139 MMOL/L (ref 135–145)
TRIGL SERPL-MCNC: 62 MG/DL (ref 0–149)
TSH SERPL DL<=0.005 MIU/L-ACNC: 1.3 UIU/ML (ref 0.38–5.33)
VIT B12 SERPL-MCNC: 468 PG/ML (ref 211–911)
WBC # BLD AUTO: 5.2 K/UL (ref 4.8–10.8)

## 2022-05-03 PROCEDURE — 36415 COLL VENOUS BLD VENIPUNCTURE: CPT

## 2022-05-03 PROCEDURE — 85025 COMPLETE CBC W/AUTO DIFF WBC: CPT

## 2022-05-03 PROCEDURE — 80061 LIPID PANEL: CPT

## 2022-05-03 PROCEDURE — 84443 ASSAY THYROID STIM HORMONE: CPT

## 2022-05-03 PROCEDURE — 82607 VITAMIN B-12: CPT

## 2022-05-03 PROCEDURE — 80053 COMPREHEN METABOLIC PANEL: CPT

## 2022-05-10 ENCOUNTER — TELEPHONE (OUTPATIENT)
Dept: NEUROLOGY | Facility: MEDICAL CENTER | Age: 47
End: 2022-05-10

## 2022-06-30 ENCOUNTER — OFFICE VISIT (OUTPATIENT)
Dept: NEUROLOGY | Facility: MEDICAL CENTER | Age: 47
End: 2022-06-30
Attending: PSYCHIATRY & NEUROLOGY
Payer: COMMERCIAL

## 2022-06-30 VITALS
WEIGHT: 199.96 LBS | SYSTOLIC BLOOD PRESSURE: 112 MMHG | OXYGEN SATURATION: 95 % | BODY MASS INDEX: 27.99 KG/M2 | HEART RATE: 93 BPM | DIASTOLIC BLOOD PRESSURE: 60 MMHG | TEMPERATURE: 97.5 F | HEIGHT: 71 IN

## 2022-06-30 DIAGNOSIS — G43.109 MIGRAINE WITH AURA AND WITHOUT STATUS MIGRAINOSUS, NOT INTRACTABLE: Primary | ICD-10-CM

## 2022-06-30 PROCEDURE — 700111 HCHG RX REV CODE 636 W/ 250 OVERRIDE (IP): Performed by: PSYCHIATRY & NEUROLOGY

## 2022-06-30 PROCEDURE — 64615 CHEMODENERV MUSC MIGRAINE: CPT | Performed by: PSYCHIATRY & NEUROLOGY

## 2022-06-30 RX ADMIN — ONABOTULINUMTOXINA 155 UNITS: 200 INJECTION, POWDER, LYOPHILIZED, FOR SOLUTION INTRADERMAL; INTRAMUSCULAR at 08:17

## 2022-06-30 ASSESSMENT — FIBROSIS 4 INDEX: FIB4 SCORE: 0.76

## 2022-06-30 NOTE — PROGRESS NOTES
[facility]  BOTOX PROCEDURE NOTE    Chronic Migraine:  Botox therapy has reduced patient’s migraines by more than 7 days and/or 100 hours per month.     I treated Marcela Tiwari in clinic today with BotoxA 155 units according to the dosing/injection paradigm currently mandated by the FDA for the management of chronic migraine.  Specifically, I injected:  - 5 units to the procerus,  - 5 units to the corrugators (bilaterally),  - a total of 20 units to the frontalis musculature,  - 20 units to the temporalis (bilaterally),  - 15 units to the occipitalis (bilaterally),  - 10 units to the cervical paraspinals (bilaterally), and  - 15 units to the trapezius musculature (bilaterally).    The remainder of the Botox was discarded as wastage per FDA guidelines  Consent on file.  Patient identify verified with 2 patient identifiers.     Frequency of headaches is >15 days monthly with at least 8 migraines monthly.    Migraines include at least two of the following: worsened with activity or avoidance of activity with migraines (ie they go lie down), moderate to severe pain intensity, pulsing headache, unilateral headache and has  have either nausea or vomiting OR sensitivity to light and sound.     Although Marcela Tiwari is responding to botox she is NOT migraine free.  I recommend that Botox be continued at this time.    Marcela Tiwari has chronic migraines, defined as having 15 or more headaches days per month, 8 of which are migraines, over a minimum of the last three months.  Episodes last more than 4 hours (untreated).  Pt has 2 or more of following (see initial note):  - headache worsened with activity  - pain is moderate to severe intensity  - pulsing in nature  - unilateral   and patient either has nausea/vomiting OR sensitivity to light and sound.    No adverse effect of Botox noted at conclusion of today's appointment.    Follow up in 12 weeks for Botox or sooner if  needed.    Signed: Caesar Vigil M.D.

## 2022-08-09 ENCOUNTER — TELEPHONE (OUTPATIENT)
Dept: NEUROLOGY | Facility: MEDICAL CENTER | Age: 47
End: 2022-08-09

## 2022-08-09 NOTE — TELEPHONE ENCOUNTER
Emgality 120mg/ml SOAJ    RTS - Also LOCKOUT - PLS ENTER DUR/PPS SERVICE CODES          1 Retail Fill Then Must See [facility]. Call [phone]             Refill Payable on or after 08/24/22     - 08/09/2022 10:21am

## 2022-08-18 ENCOUNTER — HOSPITAL ENCOUNTER (OUTPATIENT)
Dept: LAB | Facility: MEDICAL CENTER | Age: 47
End: 2022-08-18
Attending: FAMILY MEDICINE
Payer: COMMERCIAL

## 2022-08-18 LAB
ALBUMIN SERPL BCP-MCNC: 4.9 G/DL (ref 3.2–4.9)
ALBUMIN/GLOB SERPL: 1.8 G/DL
ALP SERPL-CCNC: 64 U/L (ref 30–99)
ALT SERPL-CCNC: 14 U/L (ref 2–50)
ANION GAP SERPL CALC-SCNC: 11 MMOL/L (ref 7–16)
AST SERPL-CCNC: 13 U/L (ref 12–45)
BASOPHILS # BLD AUTO: 0.7 % (ref 0–1.8)
BASOPHILS # BLD: 0.04 K/UL (ref 0–0.12)
BILIRUB SERPL-MCNC: 0.3 MG/DL (ref 0.1–1.5)
BUN SERPL-MCNC: 17 MG/DL (ref 8–22)
CALCIUM SERPL-MCNC: 9.6 MG/DL (ref 8.5–10.5)
CHLORIDE SERPL-SCNC: 101 MMOL/L (ref 96–112)
CO2 SERPL-SCNC: 25 MMOL/L (ref 20–33)
CREAT SERPL-MCNC: 0.69 MG/DL (ref 0.5–1.4)
EOSINOPHIL # BLD AUTO: 0.22 K/UL (ref 0–0.51)
EOSINOPHIL NFR BLD: 3.7 % (ref 0–6.9)
ERYTHROCYTE [DISTWIDTH] IN BLOOD BY AUTOMATED COUNT: 43.7 FL (ref 35.9–50)
FSH SERPL-ACNC: 48.6 MIU/ML
GFR SERPLBLD CREATININE-BSD FMLA CKD-EPI: 109 ML/MIN/1.73 M 2
GLOBULIN SER CALC-MCNC: 2.7 G/DL (ref 1.9–3.5)
GLUCOSE SERPL-MCNC: 96 MG/DL (ref 65–99)
HCT VFR BLD AUTO: 41.9 % (ref 37–47)
HGB BLD-MCNC: 13.8 G/DL (ref 12–16)
IMM GRANULOCYTES # BLD AUTO: 0.01 K/UL (ref 0–0.11)
IMM GRANULOCYTES NFR BLD AUTO: 0.2 % (ref 0–0.9)
LH SERPL-ACNC: 23.4 IU/L
LYMPHOCYTES # BLD AUTO: 1.98 K/UL (ref 1–4.8)
LYMPHOCYTES NFR BLD: 33.5 % (ref 22–41)
MCH RBC QN AUTO: 30.5 PG (ref 27–33)
MCHC RBC AUTO-ENTMCNC: 32.9 G/DL (ref 33.6–35)
MCV RBC AUTO: 92.7 FL (ref 81.4–97.8)
MONOCYTES # BLD AUTO: 0.39 K/UL (ref 0–0.85)
MONOCYTES NFR BLD AUTO: 6.6 % (ref 0–13.4)
NEUTROPHILS # BLD AUTO: 3.27 K/UL (ref 2–7.15)
NEUTROPHILS NFR BLD: 55.3 % (ref 44–72)
NRBC # BLD AUTO: 0 K/UL
NRBC BLD-RTO: 0 /100 WBC
PLATELET # BLD AUTO: 271 K/UL (ref 164–446)
PMV BLD AUTO: 10.3 FL (ref 9–12.9)
POTASSIUM SERPL-SCNC: 4.1 MMOL/L (ref 3.6–5.5)
PROT SERPL-MCNC: 7.6 G/DL (ref 6–8.2)
RBC # BLD AUTO: 4.52 M/UL (ref 4.2–5.4)
SODIUM SERPL-SCNC: 137 MMOL/L (ref 135–145)
TSH SERPL DL<=0.005 MIU/L-ACNC: 1.54 UIU/ML (ref 0.38–5.33)
VIT B12 SERPL-MCNC: 461 PG/ML (ref 211–911)
WBC # BLD AUTO: 5.9 K/UL (ref 4.8–10.8)

## 2022-08-18 PROCEDURE — 36415 COLL VENOUS BLD VENIPUNCTURE: CPT

## 2022-08-18 PROCEDURE — 80053 COMPREHEN METABOLIC PANEL: CPT

## 2022-08-18 PROCEDURE — 84443 ASSAY THYROID STIM HORMONE: CPT

## 2022-08-18 PROCEDURE — 83001 ASSAY OF GONADOTROPIN (FSH): CPT

## 2022-08-18 PROCEDURE — 82607 VITAMIN B-12: CPT

## 2022-08-18 PROCEDURE — 83002 ASSAY OF GONADOTROPIN (LH): CPT

## 2022-08-18 PROCEDURE — 85025 COMPLETE CBC W/AUTO DIFF WBC: CPT

## 2022-09-15 ENCOUNTER — APPOINTMENT (OUTPATIENT)
Dept: HEMATOLOGY ONCOLOGY | Facility: MEDICAL CENTER | Age: 47
End: 2022-09-15
Payer: COMMERCIAL

## 2022-09-22 ENCOUNTER — OFFICE VISIT (OUTPATIENT)
Dept: NEUROLOGY | Facility: MEDICAL CENTER | Age: 47
End: 2022-09-22
Attending: PSYCHIATRY & NEUROLOGY
Payer: COMMERCIAL

## 2022-09-22 VITALS
RESPIRATION RATE: 16 BRPM | WEIGHT: 197.97 LBS | HEIGHT: 70 IN | TEMPERATURE: 97.9 F | HEART RATE: 68 BPM | OXYGEN SATURATION: 98 % | SYSTOLIC BLOOD PRESSURE: 106 MMHG | BODY MASS INDEX: 28.34 KG/M2 | DIASTOLIC BLOOD PRESSURE: 62 MMHG

## 2022-09-22 DIAGNOSIS — G43.E09 CHRONIC MIGRAINE WITH AURA: Primary | ICD-10-CM

## 2022-09-22 PROCEDURE — 64615 CHEMODENERV MUSC MIGRAINE: CPT | Performed by: PSYCHIATRY & NEUROLOGY

## 2022-09-22 PROCEDURE — 700111 HCHG RX REV CODE 636 W/ 250 OVERRIDE (IP): Performed by: PSYCHIATRY & NEUROLOGY

## 2022-09-22 PROCEDURE — 700101 HCHG RX REV CODE 250: Performed by: PSYCHIATRY & NEUROLOGY

## 2022-09-22 RX ORDER — ESCITALOPRAM OXALATE 10 MG/1
10 TABLET ORAL DAILY
COMMUNITY
End: 2022-11-10

## 2022-09-22 RX ADMIN — SODIUM CHLORIDE 155 UNITS: 9 INJECTION INTRAMUSCULAR; INTRAVENOUS; SUBCUTANEOUS at 12:01

## 2022-09-22 ASSESSMENT — FIBROSIS 4 INDEX: FIB4 SCORE: 0.56

## 2022-09-29 ENCOUNTER — TELEPHONE (OUTPATIENT)
Dept: HEMATOLOGY ONCOLOGY | Facility: MEDICAL CENTER | Age: 47
End: 2022-09-29
Payer: COMMERCIAL

## 2022-09-29 NOTE — TELEPHONE ENCOUNTER
Patient states she cancelled today's appointment via her My Chart.    Patient is asking that new orders for x-rays be put in.  She will get them done, and reschedule this 1 year follow up visit at a later date.

## 2022-09-30 DIAGNOSIS — Z85.830 HX OF EWING'S SARCOMA: ICD-10-CM

## 2022-10-06 ENCOUNTER — TELEPHONE (OUTPATIENT)
Dept: HEMATOLOGY ONCOLOGY | Facility: MEDICAL CENTER | Age: 47
End: 2022-10-06
Payer: COMMERCIAL

## 2022-10-06 ENCOUNTER — APPOINTMENT (OUTPATIENT)
Dept: RADIOLOGY | Facility: MEDICAL CENTER | Age: 47
End: 2022-10-06
Attending: STUDENT IN AN ORGANIZED HEALTH CARE EDUCATION/TRAINING PROGRAM
Payer: COMMERCIAL

## 2022-10-06 DIAGNOSIS — Z85.830 HX OF EWING'S SARCOMA: ICD-10-CM

## 2022-10-06 PROCEDURE — 71046 X-RAY EXAM CHEST 2 VIEWS: CPT

## 2022-10-06 NOTE — TELEPHONE ENCOUNTER
Per request of Dr. Cunningham, called pt to inform her that her chest xray looks fine & to plan on scheduling a follow-up appt with Dr. Cunningham in 1 year.  Pt appreciative of call, verbalized understanding & stated no further questions.

## 2022-11-04 DIAGNOSIS — G43.109 MIGRAINE WITH AURA AND WITHOUT STATUS MIGRAINOSUS, NOT INTRACTABLE: ICD-10-CM

## 2022-11-10 ENCOUNTER — OCCUPATIONAL MEDICINE (OUTPATIENT)
Dept: URGENT CARE | Facility: PHYSICIAN GROUP | Age: 47
End: 2022-11-10
Payer: COMMERCIAL

## 2022-11-10 ENCOUNTER — APPOINTMENT (OUTPATIENT)
Dept: RADIOLOGY | Facility: IMAGING CENTER | Age: 47
End: 2022-11-10
Attending: FAMILY MEDICINE
Payer: COMMERCIAL

## 2022-11-10 VITALS
DIASTOLIC BLOOD PRESSURE: 70 MMHG | BODY MASS INDEX: 26.48 KG/M2 | OXYGEN SATURATION: 97 % | RESPIRATION RATE: 16 BRPM | HEART RATE: 74 BPM | WEIGHT: 185 LBS | SYSTOLIC BLOOD PRESSURE: 118 MMHG | HEIGHT: 70 IN | TEMPERATURE: 98.4 F

## 2022-11-10 DIAGNOSIS — S90.31XA CONTUSION OF RIGHT FOOT, INITIAL ENCOUNTER: ICD-10-CM

## 2022-11-10 PROCEDURE — 73630 X-RAY EXAM OF FOOT: CPT | Mod: TC,RT | Performed by: FAMILY MEDICINE

## 2022-11-10 PROCEDURE — 99213 OFFICE O/P EST LOW 20 MIN: CPT | Performed by: FAMILY MEDICINE

## 2022-11-10 RX ORDER — SUMATRIPTAN 20 MG/1
SPRAY NASAL
COMMUNITY
Start: 2022-10-28 | End: 2023-10-24 | Stop reason: SDUPTHER

## 2022-11-10 RX ORDER — SUMATRIPTAN 50 MG/1
TABLET, FILM COATED ORAL
COMMUNITY
Start: 2022-10-28 | End: 2022-11-10

## 2022-11-10 ASSESSMENT — FIBROSIS 4 INDEX: FIB4 SCORE: 0.56

## 2022-11-10 NOTE — LETTER
[address] - [place] [address]  Phone:  [phone] - Fax:  [phone]   [facility] Progress Report and Disability Certification  Date of Service: 11/10/2022   No Show:  No  Date / Time of Next Visit: 12/22/2022 3:30PM   Claim Information   Patient Name: Marcela Tiwari  Claim Number:     Employer:    Date of Injury: 9/12/2022     Insurer / TPA: Prasad Cohen  ID / SSN:     Occupation: Chemisty Professor  Diagnosis: The encounter diagnosis was Contusion of right foot, initial encounter.    Medical Information   Related to Industrial Injury? 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 Lots pain initially, now just mild 1/10 discomfort at times.   Objective Findings:     Pre-Existing Condition(s):     Assessment:   Initial Visit    Status:    Permanent Disability:No    Plan:      Diagnostics: X-ray    Comments:       Disability Information   Status: Released to Full Duty    From:  11/10/2022  Through: 12/22/2022 Restrictions are:     Physical Restrictions   Sitting:    Standing:    Stooping:    Bending:      Squatting:    Walking:    Climbing:    Pushing:      Pulling:    Other:    Reaching Above Shoulder (L):   Reaching Above Shoulder (R):       Reaching Below Shoulder (L):    Reaching Below Shoulder (R):      Not to exceed Weight Limits   Carrying(hrs):   Weight Limit(lb):   Lifting(hrs):   Weight  Limit(lb):     Comments:      Repetitive Actions   Hands: i.e. Fine Manipulations from Grasping:     Feet: i.e. Operating Foot Controls:     Driving / Operate Machinery:     Health Care Provider’s Original or Electronic Signature  Madhu López M.D. Health Care Provider’s Original or Electronic Signature    Jimmy Mcpherson DO MPH     Clinic Name / Location: [address]  [place] [address] Clinic Phone Number:  Dept: [phone]   Appointment Time: 4:45 Pm Visit Start Time: 5:20 PM   Check-In Time:  4:52 Pm Visit Discharge Time:  5:52PM   Original-Treating Physician or Chiropractor    Page 2-Insurer/TPA    Page 3-Employer    Page 4-Employee

## 2022-11-10 NOTE — LETTER
"EMPLOYEE’S CLAIM FOR COMPENSATION/ REPORT OF INITIAL TREATMENT  FORM C-4    EMPLOYEE’S CLAIM - PROVIDE ALL INFORMATION REQUESTED   First Name  Marcela Last Name  Karie Birthdate                    1975                Sex  female Claim Number (Insurer’s Use Only)    Home Address  [address]  Age  46 y.o. Height  1.778 m (5' 10\") Weight  83.9 kg (185 lb) [facility]     [facility] Zip  [address] Telephone  [phone] (home)    Mailing Address  [address]  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 (Be specific an answer in detail. 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  N/A Witnesses to the Accident  Studnets      Nature of Injury or Occupational Disease  Sprain  Part(s) of Body Injured or Affected  Foot (R), N/A, N/A    I certify that the above is true and correct to the best " of my knowledge and that I have provided this information in order to obtain the benefits of [place]).  I hereby authorize any physician, chiropractor, surgeon, practitioner, or other person, any hospital, including [facility] or [facility], any medical service organization, any insurance company, or other institution or organization to release to each other, any medical or other information, including benefits paid or payable, pertinent to this injury or disease, except information relative to diagnosis, treatment and/or counseling for AIDS, psychological conditions, alcohol or controlled substances, for which I must give specific authorization.  A Photostat of this authorization shall be as valid as the original.     Date 11/10/2022   [facility]  Employee’s Original or  *Electronic Signature   THIS REPORT MUST BE COMPLETED AND MAILED WITHIN 3 WORKING DAYS OF TREATMENT   [facility]  Name of Facility  [facility]   Date  11/10/2022 Diagnosis and Description of Injury or Occupational Disease  (S90.31XA) Contusion of right foot, initial encounter Is there evidence the injured employee was under the influence of alcohol and/or another controlled substance at the time of accident?  ? No ? Yes (if yes, please explain)    Hour  5:20 PM   The encounter diagnosis was Contusion of right foot, initial encounter. No   Treatment  X-ray  Have you advised the patient to remain off work five days or     more?    X-Ray Findings  Negative   ? Yes Indicate dates:   From   To      From information given by the employee, together with medical evidence, can        you directly connect this injury or occupational disease as job incurred?  Yes ? No If no, is the injured employee capable of:  ? full duty  Yes ? modified duty      Is additional medical care by a physician  "indicated?  Yes If Modified Duty, Specify any Limitations / Restrictions      Do you know of any previous injury or disease contributing to this condition or occupational disease?  ? Yes ? No (Explain if yes)                          No   Date  11/10/2022 Print Health Care Provider's   Madhu Vigil M.D. I certify the employer’s copy of  this form was mailed on:   Address  [address] [place] [address]. [address] Insurer’s Use Only     [facility]  [address]    Provider’s Tax ID Number  [id number] Telephone  Dept: [phone]             Health Care Provider’s Original or Electronic Signature  e-SignMADHU VIGIL M.D. Degree (MD,DO, DC,PA-C,APRN)   MD      * Complete and attach Release of Information (Form C-4A) when injured employee signs C-4 Form electronically  ORIGINAL - TREATING HEALTHCARE PROVIDER PAGE 2 - INSURER/TPA PAGE 3 - EMPLOYER PAGE 4 - EMPLOYEE             Form C-4 (rev.08/21)           BRIEF DESCRIPTION OF RIGHTS AND BENEFITS  (Pursuant to [place]RS 616C.050)    Notice of Injury or Occupational Disease (Incident Report Form C-1): If an injury or occupational disease (OD) arises out of and in the course of employment, you must provide written notice to your employer as soon as practicable, but no later than 7 days after the accident or OD. Your employer shall maintain a sufficient supply of the required forms.    Claim for Compensation (Form C-4): If medical treatment is sought, the form C-4 is available at the place of initial treatment. A completed \"Claim for Compensation\" (Form C-4) must be filed within 90 days after an accident or OD. The treating physician or chiropractor must, within 3 working days after treatment, complete and mail to the employer, the employer's insurer and third-party , the Claim for Compensation.    Medical Treatment: If you require medical treatment for your on-the-job injury or OD, you may be required to select a physician or chiropractor from a list " provided by your workers’ compensation insurer, if it has contracted with an Organization for Managed Care (MCO) or Preferred Provider Organization (PPO) or providers of health care. If your employer has not entered into a contract with an MCO or PPO, you may select a physician or chiropractor from the Panel of Physicians and Chiropractors. Any medical costs related to your industrial injury or OD will be paid by your insurer.    Temporary Total Disability (TTD): If your doctor has certified that you are unable to work for a period of at least 5 consecutive days, or 5 cumulative days in a 20-day period, or places restrictions on you that your employer does not accommodate, you may be entitled to TTD compensation.    Temporary Partial Disability (TPD): If the wage you receive upon reemployment is less than the compensation for TTD to which you are entitled, the insurer may be required to pay you TPD compensation to make up the difference. TPD can only be paid for a maximum of 24 months.    Permanent Partial Disability (PPD): When your medical condition is stable and there is an indication of a PPD as a result of your injury or OD, within 30 days, your insurer must arrange for an evaluation by a rating physician or chiropractor to determine the degree of your PPD. The amount of your PPD award depends on the date of injury, the results of the PPD evaluation, your age and wage.    Permanent Total Disability (PTD): If you are medically certified by a treating physician or chiropractor as permanently and totally disabled and have been granted a PTD status by your insurer, you are entitled to receive monthly benefits not to exceed 66 2/3% of your average monthly wage. The amount of your PTD payments is subject to reduction if you previously received a lump-sum PPD award.    Vocational Rehabilitation Services: You may be eligible for vocational rehabilitation services if you are unable to return to the job due to a  permanent physical impairment or permanent restrictions as a result of your injury or occupational disease.    Transportation and Per Edgar Reimbursement: You may be eligible for travel expenses and per edgar associated with medical treatment.    Reopening: You may be able to reopen your claim if your condition worsens after claim closure.     Appeal Process: If you disagree with a written determination issued by the insurer or the insurer does not respond to your request, you may appeal to the Department of Administration, , by following the instructions contained in your determination letter. You must appeal the determination within 70 days from the date of the determination letter at [address], [address], [place], [place] [address], or [address], [address], [place] [address]. If you disagree with the  decision, you may appeal to the Department of Administration, . You must file your appeal within 30 days from the date of the  decision letter at [address], [address], [place], [place] [address], or [address] [facility] [address], [place] [address]. If you disagree with a decision of an , you may file a petition for judicial review with the District Court. You must do so within 30 days of the Appeal Officer’s decision. You may be represented by an  at your own expense or you may contact the [facility] for possible representation.    [place]  for Injured Workers (NAIW): If you disagree with a  decision, you may request that NAIW represent you without charge at an  Hearing. For information regarding denial of benefits, you may contact the [facility] at: [address], [address], [place] [address], [phone], or [address], [place] [address], [phone]    To File a Complaint with the Division: If you wish to file a  complaint with the  of the Division of Industrial Relations (DIR),  please contact the Workers’ Compensation Section, [address], [address], [place], [place] [address], telephone [phone]) [phone], or [address], [address], [place], [place] [address], telephone [phone].    For assistance with Workers’ Compensation Issues: You may contact the [facility] Office for Consumer Health Assistance, [address], [address], [place], [place] [address], Toll Free 1-571.206.4047, Web site: http://[facility].[place].gov/Programs/MERA E-mail: mera@[facility].nv.[facility]              __________________________________________________________________                                    _________________            Employee Name / Signature                                                                                                                            Date                                                                                                                                                                                                                              D-2 (rev. 10/20)

## 2022-11-11 NOTE — PROGRESS NOTES
"Subjective     Marcela Tiwari is a 46 y.o. 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 Lots pain initially, now just mild 1/10 discomfort at times.     HPI    Review of Systems   Musculoskeletal:  Positive for joint pain.   All other systems reviewed and are negative.           Objective     /70 (BP Location: Right arm, Patient Position: Sitting, BP Cuff Size: Adult)   Pulse 74   Temp 36.9 °C (98.4 °F) (Temporal)   Resp 16   Ht 1.778 m (5' 10\")   Wt 83.9 kg (185 lb)   SpO2 97%   BMI 26.54 kg/m²      Physical Exam  Vitals and nursing note reviewed.   Constitutional:       General: She is not in acute distress.     Appearance: Normal appearance. She is well-developed.   HENT:      Head: Normocephalic.   Pulmonary:      Effort: Pulmonary effort is normal. No respiratory distress.   Musculoskeletal:        Feet:    Feet:      Comments: No wounds/edema/discoloration. Mild if any TTP. Good srom foot/toes  Neurological:      Mental Status: She is alert.      Motor: No abnormal muscle tone.   Psychiatric:         Mood and Affect: Mood normal.         Behavior: Behavior normal.                           Assessment & Plan     1. Contusion of right foot, initial encounter  DX-FOOT-COMPLETE 3+ RIGHT          Full duty    Recheck in 6 weeks with anticipated MMI         "

## 2022-12-09 ENCOUNTER — TELEPHONE (OUTPATIENT)
Dept: NEUROLOGY | Facility: MEDICAL CENTER | Age: 47
End: 2022-12-09
Payer: COMMERCIAL

## 2022-12-09 NOTE — TELEPHONE ENCOUNTER

## 2022-12-15 ENCOUNTER — OFFICE VISIT (OUTPATIENT)
Dept: NEUROLOGY | Facility: MEDICAL CENTER | Age: 47
End: 2022-12-15
Attending: PSYCHIATRY & NEUROLOGY
Payer: COMMERCIAL

## 2022-12-15 VITALS
BODY MASS INDEX: 27.24 KG/M2 | TEMPERATURE: 98 F | RESPIRATION RATE: 14 BRPM | OXYGEN SATURATION: 95 % | SYSTOLIC BLOOD PRESSURE: 100 MMHG | HEIGHT: 70 IN | DIASTOLIC BLOOD PRESSURE: 60 MMHG | WEIGHT: 190.26 LBS | HEART RATE: 79 BPM

## 2022-12-15 DIAGNOSIS — G43.E09 CHRONIC MIGRAINE WITH AURA: Primary | ICD-10-CM

## 2022-12-15 PROCEDURE — 64615 CHEMODENERV MUSC MIGRAINE: CPT | Performed by: PSYCHIATRY & NEUROLOGY

## 2022-12-15 PROCEDURE — 700101 HCHG RX REV CODE 250: Performed by: PSYCHIATRY & NEUROLOGY

## 2022-12-15 PROCEDURE — 700111 HCHG RX REV CODE 636 W/ 250 OVERRIDE (IP): Performed by: PSYCHIATRY & NEUROLOGY

## 2022-12-15 RX ORDER — ESCITALOPRAM OXALATE 10 MG/1
10 TABLET ORAL DAILY
COMMUNITY

## 2022-12-15 RX ADMIN — SODIUM CHLORIDE 155 UNITS: 9 INJECTION INTRAMUSCULAR; INTRAVENOUS; SUBCUTANEOUS at 15:08

## 2022-12-15 ASSESSMENT — FIBROSIS 4 INDEX: FIB4 SCORE: 0.58

## 2023-03-02 ENCOUNTER — HOSPITAL ENCOUNTER (OUTPATIENT)
Dept: LAB | Facility: MEDICAL CENTER | Age: 48
End: 2023-03-02
Attending: FAMILY MEDICINE
Payer: COMMERCIAL

## 2023-03-02 LAB
25(OH)D3 SERPL-MCNC: 27 NG/ML (ref 30–100)
ALBUMIN SERPL BCP-MCNC: 4.7 G/DL (ref 3.2–4.9)
ALBUMIN/GLOB SERPL: 1.6 G/DL
ALP SERPL-CCNC: 63 U/L (ref 30–99)
ALT SERPL-CCNC: 15 U/L (ref 2–50)
ANION GAP SERPL CALC-SCNC: 7 MMOL/L (ref 7–16)
AST SERPL-CCNC: 14 U/L (ref 12–45)
BASOPHILS # BLD AUTO: 0.6 % (ref 0–1.8)
BASOPHILS # BLD: 0.03 K/UL (ref 0–0.12)
BILIRUB SERPL-MCNC: 0.3 MG/DL (ref 0.1–1.5)
BUN SERPL-MCNC: 17 MG/DL (ref 8–22)
CALCIUM ALBUM COR SERPL-MCNC: 9.2 MG/DL (ref 8.5–10.5)
CALCIUM SERPL-MCNC: 9.8 MG/DL (ref 8.5–10.5)
CHLORIDE SERPL-SCNC: 101 MMOL/L (ref 96–112)
CHOLEST SERPL-MCNC: 163 MG/DL (ref 100–199)
CO2 SERPL-SCNC: 31 MMOL/L (ref 20–33)
CREAT SERPL-MCNC: 0.7 MG/DL (ref 0.5–1.4)
EOSINOPHIL # BLD AUTO: 0.3 K/UL (ref 0–0.51)
EOSINOPHIL NFR BLD: 6.3 % (ref 0–6.9)
ERYTHROCYTE [DISTWIDTH] IN BLOOD BY AUTOMATED COUNT: 44.3 FL (ref 35.9–50)
EST. AVERAGE GLUCOSE BLD GHB EST-MCNC: 111 MG/DL
FASTING STATUS PATIENT QL REPORTED: NORMAL
GFR SERPLBLD CREATININE-BSD FMLA CKD-EPI: 108 ML/MIN/1.73 M 2
GLOBULIN SER CALC-MCNC: 3 G/DL (ref 1.9–3.5)
GLUCOSE SERPL-MCNC: 89 MG/DL (ref 65–99)
HBA1C MFR BLD: 5.5 % (ref 4–5.6)
HCT VFR BLD AUTO: 41.1 % (ref 37–47)
HDLC SERPL-MCNC: 63 MG/DL
HGB BLD-MCNC: 13.3 G/DL (ref 12–16)
IMM GRANULOCYTES # BLD AUTO: 0.01 K/UL (ref 0–0.11)
IMM GRANULOCYTES NFR BLD AUTO: 0.2 % (ref 0–0.9)
LDLC SERPL CALC-MCNC: 87 MG/DL
LYMPHOCYTES # BLD AUTO: 1.68 K/UL (ref 1–4.8)
LYMPHOCYTES NFR BLD: 35.1 % (ref 22–41)
MCH RBC QN AUTO: 30.6 PG (ref 27–33)
MCHC RBC AUTO-ENTMCNC: 32.4 G/DL (ref 33.6–35)
MCV RBC AUTO: 94.5 FL (ref 81.4–97.8)
MONOCYTES # BLD AUTO: 0.42 K/UL (ref 0–0.85)
MONOCYTES NFR BLD AUTO: 8.8 % (ref 0–13.4)
NEUTROPHILS # BLD AUTO: 2.35 K/UL (ref 2–7.15)
NEUTROPHILS NFR BLD: 49 % (ref 44–72)
NRBC # BLD AUTO: 0 K/UL
NRBC BLD-RTO: 0 /100 WBC
PLATELET # BLD AUTO: 241 K/UL (ref 164–446)
PMV BLD AUTO: 10 FL (ref 9–12.9)
POTASSIUM SERPL-SCNC: 4 MMOL/L (ref 3.6–5.5)
PROT SERPL-MCNC: 7.7 G/DL (ref 6–8.2)
RBC # BLD AUTO: 4.35 M/UL (ref 4.2–5.4)
SODIUM SERPL-SCNC: 139 MMOL/L (ref 135–145)
TRIGL SERPL-MCNC: 66 MG/DL (ref 0–149)
TSH SERPL DL<=0.005 MIU/L-ACNC: 1.34 UIU/ML (ref 0.38–5.33)
WBC # BLD AUTO: 4.8 K/UL (ref 4.8–10.8)

## 2023-03-02 PROCEDURE — 83036 HEMOGLOBIN GLYCOSYLATED A1C: CPT

## 2023-03-02 PROCEDURE — 82306 VITAMIN D 25 HYDROXY: CPT

## 2023-03-02 PROCEDURE — 36415 COLL VENOUS BLD VENIPUNCTURE: CPT

## 2023-03-02 PROCEDURE — 80053 COMPREHEN METABOLIC PANEL: CPT

## 2023-03-02 PROCEDURE — 85025 COMPLETE CBC W/AUTO DIFF WBC: CPT

## 2023-03-02 PROCEDURE — 80061 LIPID PANEL: CPT

## 2023-03-02 PROCEDURE — 84443 ASSAY THYROID STIM HORMONE: CPT

## 2023-03-23 ENCOUNTER — APPOINTMENT (OUTPATIENT)
Dept: NEUROLOGY | Facility: MEDICAL CENTER | Age: 48
End: 2023-03-23
Attending: PSYCHIATRY & NEUROLOGY
Payer: COMMERCIAL

## 2023-04-04 ENCOUNTER — OFFICE VISIT (OUTPATIENT)
Dept: NEUROLOGY | Facility: MEDICAL CENTER | Age: 48
End: 2023-04-04
Attending: PSYCHIATRY & NEUROLOGY
Payer: COMMERCIAL

## 2023-04-04 VITALS
HEART RATE: 70 BPM | RESPIRATION RATE: 16 BRPM | DIASTOLIC BLOOD PRESSURE: 64 MMHG | TEMPERATURE: 97.6 F | OXYGEN SATURATION: 97 % | SYSTOLIC BLOOD PRESSURE: 100 MMHG | HEIGHT: 70 IN | BODY MASS INDEX: 25.53 KG/M2 | WEIGHT: 178.35 LBS

## 2023-04-04 DIAGNOSIS — G43.E09 CHRONIC MIGRAINE WITH AURA: Primary | ICD-10-CM

## 2023-04-04 PROCEDURE — 64615 CHEMODENERV MUSC MIGRAINE: CPT | Performed by: PSYCHIATRY & NEUROLOGY

## 2023-04-04 PROCEDURE — 700101 HCHG RX REV CODE 250: Performed by: PSYCHIATRY & NEUROLOGY

## 2023-04-04 PROCEDURE — 700111 HCHG RX REV CODE 636 W/ 250 OVERRIDE (IP): Performed by: PSYCHIATRY & NEUROLOGY

## 2023-04-04 RX ADMIN — SODIUM CHLORIDE 155 UNITS: 9 INJECTION, SOLUTION INTRAMUSCULAR; INTRAVENOUS; SUBCUTANEOUS at 10:30

## 2023-04-04 ASSESSMENT — FIBROSIS 4 INDEX: FIB4 SCORE: 0.67

## 2023-04-04 ASSESSMENT — PATIENT HEALTH QUESTIONNAIRE - PHQ9: CLINICAL INTERPRETATION OF PHQ2 SCORE: 0

## 2023-04-04 NOTE — PROGRESS NOTES
[facility]  BOTOX PROCEDURE NOTE    Chronic Migraine:  Botox therapy has reduced patient’s migraines by more than 7 days and/or 100 hours per month.     I treated Marcela Tiwari in clinic today with BotoxA 155 units according to the dosing/injection paradigm currently mandated by the FDA for the management of chronic migraine.  Specifically, I injected:  - 5 units to the procerus,  - 5 units to the corrugators (bilaterally),  - a total of 20 units to the frontalis musculature,  - 20 units to the temporalis (bilaterally),  - 15 units to the occipitalis (bilaterally),  - 10 units to the cervical paraspinals (bilaterally), and  - 15 units to the trapezius musculature (bilaterally).    The remainder of the Botox was discarded as wastage per FDA guidelines  Consent on file.  Patient identify verified with 2 patient identifiers.     Frequency of headaches is >15 days monthly with at least 8 migraines monthly.    Migraines include at least two of the following: worsened with activity or avoidance of activity with migraines (ie they go lie down), moderate to severe pain intensity, pulsing headache, unilateral headache and has  have either nausea or vomiting OR sensitivity to light and sound.     Although Marcela Tiwari is responding to botox s/he is NOT migraine free.  I recommend that Botox be continued at this time.    Marcela Tiwari has chronic migraines, defined as having 15 or more headaches days per month, 8 of which are migraines, over a minimum of the last three months.  Episodes last more than 4 hours (untreated).  Pt has 2 or more of following (see initial note):  - headache worsened with activity  - pain is moderate to severe intensity  - pulsing in nature  - unilateral   and patient either has nausea/vomiting OR sensitivity to light and sound.    No adverse effect of Botox noted at conclusion of today's appointment.    Follow up in 12 weeks for Botox or sooner if  needed.    Signed: Caesar Vigil M.D.

## 2023-05-26 ENCOUNTER — TELEPHONE (OUTPATIENT)
Dept: ADMISSIONS | Facility: MEDICAL CENTER | Age: 48
End: 2023-05-26
Payer: COMMERCIAL

## 2023-06-18 ENCOUNTER — TELEPHONE (OUTPATIENT)
Dept: NEUROLOGY | Facility: MEDICAL CENTER | Age: 48
End: 2023-06-18
Payer: COMMERCIAL

## 2023-06-18 DIAGNOSIS — G43.E09 CHRONIC MIGRAINE WITH AURA: Primary | ICD-10-CM

## 2023-06-19 RX ORDER — GALCANEZUMAB 120 MG/ML
INJECTION, SOLUTION SUBCUTANEOUS
Qty: 3 ML | Refills: 11 | Status: SHIPPED | OUTPATIENT
Start: 2023-06-19 | End: 2023-08-02

## 2023-06-29 ENCOUNTER — TELEPHONE (OUTPATIENT)
Dept: NEUROLOGY | Facility: MEDICAL CENTER | Age: 48
End: 2023-06-29
Payer: COMMERCIAL

## 2023-06-29 NOTE — TELEPHONE ENCOUNTER
Attempted to contact patient at [phone] to discuss [facility] and services/benefits offered. No answer, left voicemail.      Hailey Barone

## 2023-07-13 ENCOUNTER — OFFICE VISIT (OUTPATIENT)
Dept: NEUROLOGY | Facility: MEDICAL CENTER | Age: 48
End: 2023-07-13
Attending: PSYCHIATRY & NEUROLOGY
Payer: COMMERCIAL

## 2023-07-13 VITALS
SYSTOLIC BLOOD PRESSURE: 90 MMHG | HEIGHT: 70 IN | BODY MASS INDEX: 29.01 KG/M2 | WEIGHT: 202.6 LBS | TEMPERATURE: 98.5 F | HEART RATE: 75 BPM | DIASTOLIC BLOOD PRESSURE: 56 MMHG | OXYGEN SATURATION: 95 %

## 2023-07-13 DIAGNOSIS — G43.E09 CHRONIC MIGRAINE WITH AURA: Primary | ICD-10-CM

## 2023-07-13 PROCEDURE — 700101 HCHG RX REV CODE 250: Performed by: PSYCHIATRY & NEUROLOGY

## 2023-07-13 PROCEDURE — 64615 CHEMODENERV MUSC MIGRAINE: CPT | Performed by: PSYCHIATRY & NEUROLOGY

## 2023-07-13 PROCEDURE — 3074F SYST BP LT 130 MM HG: CPT | Performed by: PSYCHIATRY & NEUROLOGY

## 2023-07-13 PROCEDURE — 3078F DIAST BP <80 MM HG: CPT | Performed by: PSYCHIATRY & NEUROLOGY

## 2023-07-13 PROCEDURE — 700111 HCHG RX REV CODE 636 W/ 250 OVERRIDE (IP): Mod: JZ | Performed by: PSYCHIATRY & NEUROLOGY

## 2023-07-13 RX ADMIN — SODIUM CHLORIDE 155 UNITS: 9 INJECTION, SOLUTION INTRAMUSCULAR; INTRAVENOUS; SUBCUTANEOUS at 11:45

## 2023-07-13 ASSESSMENT — FIBROSIS 4 INDEX: FIB4 SCORE: 0.67

## 2023-07-29 DIAGNOSIS — G43.E09 CHRONIC MIGRAINE WITH AURA: ICD-10-CM

## 2023-08-02 RX ORDER — GALCANEZUMAB 120 MG/ML
INJECTION, SOLUTION SUBCUTANEOUS
Qty: 1 ML | Refills: 11 | Status: SHIPPED | OUTPATIENT
Start: 2023-08-02

## 2023-09-01 ENCOUNTER — APPOINTMENT (RX ONLY)
Dept: URBAN - METROPOLITAN AREA CLINIC 15 | Facility: CLINIC | Age: 48
Setting detail: DERMATOLOGY
End: 2023-09-01

## 2023-09-01 DIAGNOSIS — L73.8 OTHER SPECIFIED FOLLICULAR DISORDERS: ICD-10-CM

## 2023-09-01 DIAGNOSIS — D18.0 HEMANGIOMA: ICD-10-CM

## 2023-09-01 DIAGNOSIS — L91.8 OTHER HYPERTROPHIC DISORDERS OF THE SKIN: ICD-10-CM

## 2023-09-01 DIAGNOSIS — L81.4 OTHER MELANIN HYPERPIGMENTATION: ICD-10-CM

## 2023-09-01 DIAGNOSIS — Z71.89 OTHER SPECIFIED COUNSELING: ICD-10-CM

## 2023-09-01 DIAGNOSIS — D22 MELANOCYTIC NEVI: ICD-10-CM

## 2023-09-01 PROBLEM — D22.5 MELANOCYTIC NEVI OF TRUNK: Status: ACTIVE | Noted: 2023-09-01

## 2023-09-01 PROBLEM — D22.72 MELANOCYTIC NEVI OF LEFT LOWER LIMB, INCLUDING HIP: Status: ACTIVE | Noted: 2023-09-01

## 2023-09-01 PROBLEM — D22.22 MELANOCYTIC NEVI OF LEFT EAR AND EXTERNAL AURICULAR CANAL: Status: ACTIVE | Noted: 2023-09-01

## 2023-09-01 PROBLEM — D22.21 MELANOCYTIC NEVI OF RIGHT EAR AND EXTERNAL AURICULAR CANAL: Status: ACTIVE | Noted: 2023-09-01

## 2023-09-01 PROBLEM — D22.61 MELANOCYTIC NEVI OF RIGHT UPPER LIMB, INCLUDING SHOULDER: Status: ACTIVE | Noted: 2023-09-01

## 2023-09-01 PROBLEM — D22.62 MELANOCYTIC NEVI OF LEFT UPPER LIMB, INCLUDING SHOULDER: Status: ACTIVE | Noted: 2023-09-01

## 2023-09-01 PROBLEM — D22.4 MELANOCYTIC NEVI OF SCALP AND NECK: Status: ACTIVE | Noted: 2023-09-01

## 2023-09-01 PROBLEM — D22.71 MELANOCYTIC NEVI OF RIGHT LOWER LIMB, INCLUDING HIP: Status: ACTIVE | Noted: 2023-09-01

## 2023-09-01 PROBLEM — D18.01 HEMANGIOMA OF SKIN AND SUBCUTANEOUS TISSUE: Status: ACTIVE | Noted: 2023-09-01

## 2023-09-01 PROBLEM — D22.39 MELANOCYTIC NEVI OF OTHER PARTS OF FACE: Status: ACTIVE | Noted: 2023-09-01

## 2023-09-01 PROCEDURE — ? COUNSELING

## 2023-09-01 PROCEDURE — 99203 OFFICE O/P NEW LOW 30 MIN: CPT

## 2023-09-01 ASSESSMENT — LOCATION SIMPLE DESCRIPTION DERM
LOCATION SIMPLE: RIGHT PRETIBIAL REGION
LOCATION SIMPLE: RIGHT CHEEK
LOCATION SIMPLE: SCALP
LOCATION SIMPLE: RIGHT POPLITEAL SKIN
LOCATION SIMPLE: LEFT POPLITEAL SKIN
LOCATION SIMPLE: UPPER BACK
LOCATION SIMPLE: LEFT BUTTOCK
LOCATION SIMPLE: LEFT LOWER BACK
LOCATION SIMPLE: LEFT PRETIBIAL REGION
LOCATION SIMPLE: LEFT UPPER ARM
LOCATION SIMPLE: ABDOMEN
LOCATION SIMPLE: LEFT UPPER BACK
LOCATION SIMPLE: GROIN
LOCATION SIMPLE: RIGHT POSTERIOR THIGH
LOCATION SIMPLE: CHEST
LOCATION SIMPLE: LEFT POSTERIOR THIGH
LOCATION SIMPLE: RIGHT INNER THIGH
LOCATION SIMPLE: LEFT EAR
LOCATION SIMPLE: NECK
LOCATION SIMPLE: INFERIOR FOREHEAD
LOCATION SIMPLE: RIGHT UPPER ARM
LOCATION SIMPLE: RIGHT EAR

## 2023-09-01 ASSESSMENT — LOCATION ZONE DERM
LOCATION ZONE: LEG
LOCATION ZONE: NECK
LOCATION ZONE: SCALP
LOCATION ZONE: FACE
LOCATION ZONE: ARM
LOCATION ZONE: EAR
LOCATION ZONE: TRUNK

## 2023-09-01 ASSESSMENT — LOCATION DETAILED DESCRIPTION DERM
LOCATION DETAILED: INFERIOR THORACIC SPINE
LOCATION DETAILED: LEFT SUPERIOR PARIETAL SCALP
LOCATION DETAILED: RIGHT INFERIOR CRUS OF ANTIHELIX
LOCATION DETAILED: LEFT SCAPHA
LOCATION DETAILED: LEFT CENTRAL PARIETAL SCALP
LOCATION DETAILED: RIGHT SUPRAPUBIC SKIN
LOCATION DETAILED: LEFT INFERIOR MEDIAL UPPER BACK
LOCATION DETAILED: LEFT LATERAL PROXIMAL PRETIBIAL REGION
LOCATION DETAILED: LEFT DISTAL POSTERIOR THIGH
LOCATION DETAILED: LEFT BUTTOCK
LOCATION DETAILED: LEFT ANTERIOR PROXIMAL UPPER ARM
LOCATION DETAILED: LEFT POPLITEAL SKIN
LOCATION DETAILED: LEFT CENTRAL LATERAL NECK
LOCATION DETAILED: RIGHT DISTAL POSTERIOR THIGH
LOCATION DETAILED: RIGHT PROXIMAL PRETIBIAL REGION
LOCATION DETAILED: RIGHT CENTRAL LATERAL NECK
LOCATION DETAILED: LEFT ANTERIOR DISTAL UPPER ARM
LOCATION DETAILED: RIGHT INFERIOR CENTRAL MALAR CHEEK
LOCATION DETAILED: RIGHT ANTERIOR DISTAL UPPER ARM
LOCATION DETAILED: INFERIOR MID FOREHEAD
LOCATION DETAILED: RIGHT ANTERIOR PROXIMAL UPPER ARM
LOCATION DETAILED: RIGHT MEDIAL POSTERIOR THIGH
LOCATION DETAILED: SUBXIPHOID
LOCATION DETAILED: XIPHOID
LOCATION DETAILED: LEFT INFERIOR LATERAL MIDBACK
LOCATION DETAILED: RIGHT ANTECUBITAL SKIN
LOCATION DETAILED: RIGHT MEDIAL SUPERIOR CHEST
LOCATION DETAILED: RIGHT POPLITEAL SKIN

## 2023-09-01 NOTE — PROCEDURE: COUNSELING
Detail Level: Zone
Detail Level: Detailed
Patient Specific Counseling (Will Not Stick From Patient To Patient): Advised patient how to take care of ingrown hair by cutting and not pulling it out.

## 2023-10-24 ENCOUNTER — OFFICE VISIT (OUTPATIENT)
Dept: NEUROLOGY | Facility: MEDICAL CENTER | Age: 48
End: 2023-10-24
Attending: PSYCHIATRY & NEUROLOGY
Payer: COMMERCIAL

## 2023-10-24 VITALS
RESPIRATION RATE: 16 BRPM | TEMPERATURE: 97.3 F | HEIGHT: 71 IN | DIASTOLIC BLOOD PRESSURE: 68 MMHG | HEART RATE: 74 BPM | SYSTOLIC BLOOD PRESSURE: 100 MMHG | WEIGHT: 202.82 LBS | OXYGEN SATURATION: 98 % | BODY MASS INDEX: 28.4 KG/M2

## 2023-10-24 DIAGNOSIS — G43.E09 CHRONIC MIGRAINE WITH AURA WITHOUT STATUS MIGRAINOSUS, NOT INTRACTABLE: Primary | ICD-10-CM

## 2023-10-24 PROCEDURE — 3074F SYST BP LT 130 MM HG: CPT | Performed by: PSYCHIATRY & NEUROLOGY

## 2023-10-24 PROCEDURE — 64615 CHEMODENERV MUSC MIGRAINE: CPT | Performed by: PSYCHIATRY & NEUROLOGY

## 2023-10-24 PROCEDURE — 700101 HCHG RX REV CODE 250: Performed by: PSYCHIATRY & NEUROLOGY

## 2023-10-24 PROCEDURE — 96372 THER/PROPH/DIAG INJ SC/IM: CPT | Performed by: PSYCHIATRY & NEUROLOGY

## 2023-10-24 PROCEDURE — 700111 HCHG RX REV CODE 636 W/ 250 OVERRIDE (IP): Mod: JZ | Performed by: PSYCHIATRY & NEUROLOGY

## 2023-10-24 PROCEDURE — 3078F DIAST BP <80 MM HG: CPT | Performed by: PSYCHIATRY & NEUROLOGY

## 2023-10-24 RX ORDER — KETOROLAC TROMETHAMINE 30 MG/ML
60 INJECTION, SOLUTION INTRAMUSCULAR; INTRAVENOUS ONCE
Status: COMPLETED | OUTPATIENT
Start: 2023-10-24 | End: 2023-10-24

## 2023-10-24 RX ORDER — SUMATRIPTAN 20 MG/1
SPRAY NASAL
Qty: 11 EACH | Refills: 11 | Status: SHIPPED | OUTPATIENT
Start: 2023-10-24

## 2023-10-24 RX ORDER — SUMATRIPTAN 50 MG/1
TABLET, FILM COATED ORAL
Qty: 11 TABLET | Refills: 11 | Status: SHIPPED | OUTPATIENT
Start: 2023-10-24 | End: 2023-11-23

## 2023-10-24 RX ADMIN — KETOROLAC TROMETHAMINE 60 MG: 30 INJECTION, SOLUTION INTRAMUSCULAR at 12:14

## 2023-10-24 RX ADMIN — SODIUM CHLORIDE 155 UNITS: 9 INJECTION INTRAMUSCULAR; INTRAVENOUS; SUBCUTANEOUS at 11:43

## 2023-10-24 ASSESSMENT — FIBROSIS 4 INDEX: FIB4 SCORE: 0.67

## 2023-10-25 ENCOUNTER — TELEPHONE (OUTPATIENT)
Dept: NEUROLOGY | Facility: MEDICAL CENTER | Age: 48
End: 2023-10-25

## 2023-10-25 NOTE — TELEPHONE ENCOUNTER
Received Refill PA request via [facility]  for Sumatriptan 20mg/act nasal spray. (Quantity:11 ea, Day Supply:30)     Insurance: n/a  Member ID:  n/a  BIN: n/a  PCN: n/a  Group: n/a     Ran Test claim via [place] & medication  Member does not have current ins coverage.

## 2023-10-25 NOTE — TELEPHONE ENCOUNTER
Received Refill PA request via [facility]  for Sumatriptan 50mg tab. (Quantity:11 tab, Day Supply:30)     Insurance: n/a  Member ID:  n/a  BIN: n/a  PCN: n/a  Group: n/a     Ran Test claim via [place] [facility] medication  member does not have ins on file. Called dispensing pharamcy, no active coverage on file

## 2023-11-04 ENCOUNTER — OFFICE VISIT (OUTPATIENT)
Dept: URGENT CARE | Facility: CLINIC | Age: 48
End: 2023-11-04
Payer: COMMERCIAL

## 2023-11-04 VITALS
TEMPERATURE: 98 F | HEART RATE: 76 BPM | SYSTOLIC BLOOD PRESSURE: 98 MMHG | DIASTOLIC BLOOD PRESSURE: 56 MMHG | OXYGEN SATURATION: 98 % | BODY MASS INDEX: 28.38 KG/M2 | RESPIRATION RATE: 20 BRPM | HEIGHT: 71 IN | WEIGHT: 202.7 LBS

## 2023-11-04 DIAGNOSIS — R05.1 ACUTE COUGH: ICD-10-CM

## 2023-11-04 DIAGNOSIS — B96.89 ACUTE BACTERIAL RHINOSINUSITIS: ICD-10-CM

## 2023-11-04 DIAGNOSIS — J01.90 ACUTE BACTERIAL RHINOSINUSITIS: ICD-10-CM

## 2023-11-04 PROCEDURE — 3078F DIAST BP <80 MM HG: CPT | Performed by: STUDENT IN AN ORGANIZED HEALTH CARE EDUCATION/TRAINING PROGRAM

## 2023-11-04 PROCEDURE — 99213 OFFICE O/P EST LOW 20 MIN: CPT | Performed by: STUDENT IN AN ORGANIZED HEALTH CARE EDUCATION/TRAINING PROGRAM

## 2023-11-04 PROCEDURE — 3074F SYST BP LT 130 MM HG: CPT | Performed by: STUDENT IN AN ORGANIZED HEALTH CARE EDUCATION/TRAINING PROGRAM

## 2023-11-04 RX ORDER — AMOXICILLIN AND CLAVULANATE POTASSIUM 875; 125 MG/1; MG/1
1 TABLET, FILM COATED ORAL 2 TIMES DAILY
Qty: 14 TABLET | Refills: 0 | Status: SHIPPED | OUTPATIENT
Start: 2023-11-04 | End: 2023-11-11

## 2023-11-04 RX ORDER — BENZONATATE 100 MG/1
100 CAPSULE ORAL 3 TIMES DAILY PRN
Qty: 60 CAPSULE | Refills: 0 | Status: SHIPPED | OUTPATIENT
Start: 2023-11-04

## 2023-11-04 ASSESSMENT — FIBROSIS 4 INDEX: FIB4 SCORE: 0.67

## 2023-11-04 NOTE — PROGRESS NOTES
"Subjective     Marcela Tiwari is a 47 y.o. female who presents with Pharyngitis (X1week nasal congestion/dry cough/yellow/brown mucus/face/pressure/headache/)            Marcela is a 47 y.o. male who presents to urgent care with symptoms of sinus pain/pressure, nasal congestion and sore throat.  Symptoms started 1 week ago and gradually worsened since patient is also experiencing a cough that is productive in nature.  No shortness of breath/wheezing. No fever/chills.    Sinus Problem  This is a new problem. Episode onset: 1 week. The problem has been gradually worsening since onset. Associated symptoms include congestion, coughing, headaches, sinus pressure and a sore throat. Pertinent negatives include no chills, ear pain, hoarse voice, neck pain, shortness of breath or swollen glands.       Review of Systems   Constitutional:  Positive for malaise/fatigue. Negative for chills and fever.   HENT:  Positive for congestion, sinus pressure and sore throat. Negative for ear pain and hoarse voice.    Respiratory:  Positive for cough. Negative for shortness of breath and wheezing.    Cardiovascular:  Negative for chest pain and palpitations.   Gastrointestinal:  Negative for abdominal pain, constipation, diarrhea, nausea and vomiting.   Musculoskeletal:  Negative for neck pain.   Neurological:  Positive for headaches. Negative for dizziness.   All other systems reviewed and are negative.             Objective     BP 98/56 (BP Location: Left arm, Patient Position: Sitting)   Pulse 76   Temp 36.7 °C (98 °F) (Temporal)   Resp 20   Ht 1.803 m (5' 11\")   Wt 91.9 kg (202 lb 11.2 oz)   LMP 10/14/2023 (Exact Date)   SpO2 98%   BMI 28.27 kg/m²      Physical Exam  Vitals reviewed.   Constitutional:       General: She is not in acute distress.     Appearance: Normal appearance. She is not toxic-appearing.   HENT:      Head: Normocephalic and atraumatic.      Right Ear: Tympanic membrane, ear canal and external ear " normal.      Left Ear: Tympanic membrane, ear canal and external ear normal.      Nose: Congestion present.      Right Sinus: Maxillary sinus tenderness and frontal sinus tenderness present.      Left Sinus: Maxillary sinus tenderness and frontal sinus tenderness present.      Mouth/Throat:      Mouth: Mucous membranes are moist.      Pharynx: Oropharynx is clear.   Eyes:      Extraocular Movements: Extraocular movements intact.      Conjunctiva/sclera: Conjunctivae normal.      Pupils: Pupils are equal, round, and reactive to light.   Cardiovascular:      Rate and Rhythm: Normal rate and regular rhythm.   Pulmonary:      Effort: Pulmonary effort is normal.      Breath sounds: Normal breath sounds.   Skin:     General: Skin is warm and dry.   Neurological:      General: No focal deficit present.      Mental Status: She is alert. Mental status is at baseline.                             Assessment & Plan        1. Acute bacterial rhinosinusitis  - amoxicillin-clavulanate (AUGMENTIN) 875-125 MG Tab; Take 1 Tablet by mouth 2 times a day for 7 days.  Dispense: 14 Tablet; Refill: 0    2. Acute cough  - benzonatate (TESSALON) 100 MG Cap; Take 1 Capsule by mouth 3 times a day as needed for Cough.  Dispense: 60 Capsule; Refill: 0     Differential diagnoses, supportive care measures and indications for immediate follow-up discussed with patient. Pathogenesis of diagnosis discussed including typical length and natural progression.      Instructed to return to urgent care or nearest emergency department if symptoms fail to improve, for any change in condition, further concerns, or new concerning symptoms.    Patient states understanding and agrees with the plan of care and discharge instructions.

## 2023-11-06 ASSESSMENT — ENCOUNTER SYMPTOMS
HOARSE VOICE: 0
SHORTNESS OF BREATH: 0
FEVER: 0
DIZZINESS: 0
SORE THROAT: 1
ABDOMINAL PAIN: 0
CHILLS: 0
VOMITING: 0
SWOLLEN GLANDS: 0
CONSTIPATION: 0
NECK PAIN: 0
NAUSEA: 0
DIARRHEA: 0
WHEEZING: 0
SINUS PRESSURE: 1
PALPITATIONS: 0
HEADACHES: 1
COUGH: 1

## 2023-11-15 ENCOUNTER — TELEPHONE (OUTPATIENT)
Dept: PHARMACY | Facility: MEDICAL CENTER | Age: 48
End: 2023-11-15
Payer: COMMERCIAL

## 2023-11-15 NOTE — TELEPHONE ENCOUNTER
EMGALITY 120 MG/ML SOLUTION AUTO-INJECTOR    PA Renewal      Initiated PA in [facility] (Key: [id number])      Prior Authorization for Emgality has been approved for a quantity of 1 , day supply 30    Prior Authorization reference number: [id number]  Insurance: [employer]  Effective dates: 11/15/2023 - 11/15/2024    Copay: $N/A RTS next fill 01/07/24

## 2024-01-16 ENCOUNTER — OFFICE VISIT (OUTPATIENT)
Dept: NEUROLOGY | Facility: MEDICAL CENTER | Age: 49
End: 2024-01-16
Attending: PSYCHIATRY & NEUROLOGY
Payer: COMMERCIAL

## 2024-01-16 VITALS
HEART RATE: 73 BPM | OXYGEN SATURATION: 97 % | HEIGHT: 71 IN | WEIGHT: 207.89 LBS | BODY MASS INDEX: 29.1 KG/M2 | SYSTOLIC BLOOD PRESSURE: 92 MMHG | DIASTOLIC BLOOD PRESSURE: 58 MMHG | TEMPERATURE: 97.5 F

## 2024-01-16 DIAGNOSIS — G43.E09 CHRONIC MIGRAINE WITH AURA WITHOUT STATUS MIGRAINOSUS, NOT INTRACTABLE: Primary | ICD-10-CM

## 2024-01-16 PROCEDURE — 700101 HCHG RX REV CODE 250: Performed by: PSYCHIATRY & NEUROLOGY

## 2024-01-16 PROCEDURE — 3078F DIAST BP <80 MM HG: CPT | Performed by: PSYCHIATRY & NEUROLOGY

## 2024-01-16 PROCEDURE — 3074F SYST BP LT 130 MM HG: CPT | Performed by: PSYCHIATRY & NEUROLOGY

## 2024-01-16 PROCEDURE — 700111 HCHG RX REV CODE 636 W/ 250 OVERRIDE (IP): Mod: JZ | Performed by: PSYCHIATRY & NEUROLOGY

## 2024-01-16 PROCEDURE — 64615 CHEMODENERV MUSC MIGRAINE: CPT | Performed by: PSYCHIATRY & NEUROLOGY

## 2024-01-16 RX ADMIN — SODIUM CHLORIDE 155 UNITS: 9 INJECTION, SOLUTION INTRAMUSCULAR; INTRAVENOUS; SUBCUTANEOUS at 16:28

## 2024-01-16 ASSESSMENT — PATIENT HEALTH QUESTIONNAIRE - PHQ9: CLINICAL INTERPRETATION OF PHQ2 SCORE: 0

## 2024-01-16 ASSESSMENT — FIBROSIS 4 INDEX: FIB4 SCORE: 0.69

## 2024-01-19 ENCOUNTER — OFFICE VISIT (OUTPATIENT)
Dept: URGENT CARE | Facility: CLINIC | Age: 49
End: 2024-01-19
Payer: COMMERCIAL

## 2024-01-19 VITALS
WEIGHT: 207 LBS | BODY MASS INDEX: 28.98 KG/M2 | RESPIRATION RATE: 16 BRPM | HEIGHT: 71 IN | TEMPERATURE: 98.2 F | HEART RATE: 83 BPM | DIASTOLIC BLOOD PRESSURE: 64 MMHG | OXYGEN SATURATION: 95 % | SYSTOLIC BLOOD PRESSURE: 98 MMHG

## 2024-01-19 DIAGNOSIS — K13.79 UVULAR SWELLING: ICD-10-CM

## 2024-01-19 DIAGNOSIS — R05.1 ACUTE COUGH: ICD-10-CM

## 2024-01-19 LAB — S PYO DNA SPEC NAA+PROBE: NOT DETECTED

## 2024-01-19 PROCEDURE — 87651 STREP A DNA AMP PROBE: CPT | Performed by: FAMILY MEDICINE

## 2024-01-19 PROCEDURE — 3074F SYST BP LT 130 MM HG: CPT | Performed by: FAMILY MEDICINE

## 2024-01-19 PROCEDURE — 99213 OFFICE O/P EST LOW 20 MIN: CPT | Performed by: FAMILY MEDICINE

## 2024-01-19 PROCEDURE — 3078F DIAST BP <80 MM HG: CPT | Performed by: FAMILY MEDICINE

## 2024-01-19 RX ORDER — LIDOCAINE HYDROCHLORIDE 20 MG/ML
5 SOLUTION OROPHARYNGEAL EVERY 4 HOURS PRN
Qty: 100 ML | Refills: 0 | Status: SHIPPED | OUTPATIENT
Start: 2024-01-19

## 2024-01-19 ASSESSMENT — ENCOUNTER SYMPTOMS
SORE THROAT: 1
SINUS PAIN: 1
CONSTITUTIONAL NEGATIVE: 1
GASTROINTESTINAL NEGATIVE: 1
COUGH: 1
CARDIOVASCULAR NEGATIVE: 1
MUSCULOSKELETAL NEGATIVE: 1
EYES NEGATIVE: 1

## 2024-01-19 ASSESSMENT — FIBROSIS 4 INDEX: FIB4 SCORE: 0.69

## 2024-01-19 NOTE — PROGRESS NOTES
"Subjective:   Marcela Tiwari is a 48 y.o. female who presents for Sore Throat (X1 week, dry cough )      HPI    Review of Systems   Constitutional: Negative.    HENT:  Positive for sinus pain and sore throat.    Eyes: Negative.    Respiratory:  Positive for cough.    Cardiovascular: Negative.    Gastrointestinal: Negative.    Genitourinary: Negative.    Musculoskeletal: Negative.    Skin: Negative.        Medications, Allergies, and current problem list reviewed today in Epic.     Objective:     BP 98/64   Pulse 83   Temp 36.8 °C (98.2 °F) (Temporal)   Resp 16   Ht 1.803 m (5' 11\")   Wt 93.9 kg (207 lb)   SpO2 95%     Physical Exam  Vitals and nursing note reviewed.   Constitutional:       Appearance: Normal appearance.   HENT:      Head: Normocephalic and atraumatic.      Right Ear: Tympanic membrane normal.      Left Ear: Tympanic membrane normal.      Nose: Nose normal.      Mouth/Throat:      Comments: Uvula swollen, irritated  Eyes:      Extraocular Movements: Extraocular movements intact.      Pupils: Pupils are equal, round, and reactive to light.   Cardiovascular:      Rate and Rhythm: Normal rate and regular rhythm.      Pulses: Normal pulses.      Heart sounds: Normal heart sounds.   Pulmonary:      Effort: Pulmonary effort is normal.      Breath sounds: Normal breath sounds.   Abdominal:      General: Abdomen is flat. Bowel sounds are normal.   Musculoskeletal:      Cervical back: Normal range of motion and neck supple.   Neurological:      Mental Status: She is alert.         Assessment/Plan:     Diagnosis and associated orders:     1. Uvular swelling  POCT CEPHEID GROUP A STREP - PCR    lidocaine (XYLOCAINE) 2 % Solution      2. Acute cough  lidocaine (XYLOCAINE) 2 % Solution         Comments/MDM:              Differential diagnosis, natural history, supportive care, and indications for immediate follow-up discussed.    Advised the patient to follow-up with the primary care physician " for recheck, reevaluation, and consideration of further management.    Please note that this dictation was created using voice recognition software. I have made a reasonable attempt to correct obvious errors, but I expect that there are errors of grammar and possibly content that I did not discover before finalizing the note.    This note was electronically signed by Bao Sethi M.D.

## 2024-03-17 ENCOUNTER — OFFICE VISIT (OUTPATIENT)
Dept: URGENT CARE | Facility: CLINIC | Age: 49
End: 2024-03-17
Payer: COMMERCIAL

## 2024-03-17 VITALS
OXYGEN SATURATION: 97 % | RESPIRATION RATE: 20 BRPM | SYSTOLIC BLOOD PRESSURE: 104 MMHG | WEIGHT: 205 LBS | HEART RATE: 88 BPM | TEMPERATURE: 98 F | HEIGHT: 71 IN | BODY MASS INDEX: 28.7 KG/M2 | DIASTOLIC BLOOD PRESSURE: 72 MMHG

## 2024-03-17 DIAGNOSIS — H66.002 NON-RECURRENT ACUTE SUPPURATIVE OTITIS MEDIA OF LEFT EAR WITHOUT SPONTANEOUS RUPTURE OF TYMPANIC MEMBRANE: ICD-10-CM

## 2024-03-17 DIAGNOSIS — R09.81 SINUS CONGESTION: ICD-10-CM

## 2024-03-17 DIAGNOSIS — J02.9 SORE THROAT: ICD-10-CM

## 2024-03-17 LAB
FLUAV RNA SPEC QL NAA+PROBE: NEGATIVE
FLUBV RNA SPEC QL NAA+PROBE: NEGATIVE
RSV RNA SPEC QL NAA+PROBE: NEGATIVE
S PYO DNA SPEC NAA+PROBE: NOT DETECTED
SARS-COV-2 RNA RESP QL NAA+PROBE: NEGATIVE

## 2024-03-17 PROCEDURE — 3078F DIAST BP <80 MM HG: CPT | Performed by: PHYSICIAN ASSISTANT

## 2024-03-17 PROCEDURE — 99214 OFFICE O/P EST MOD 30 MIN: CPT | Performed by: PHYSICIAN ASSISTANT

## 2024-03-17 PROCEDURE — 87651 STREP A DNA AMP PROBE: CPT | Performed by: PHYSICIAN ASSISTANT

## 2024-03-17 PROCEDURE — 0241U POCT CEPHEID COV-2, FLU A/B, RSV - PCR: CPT | Performed by: PHYSICIAN ASSISTANT

## 2024-03-17 PROCEDURE — 3074F SYST BP LT 130 MM HG: CPT | Performed by: PHYSICIAN ASSISTANT

## 2024-03-17 RX ORDER — PREDNISONE 20 MG/1
TABLET ORAL
Qty: 10 TABLET | Refills: 0 | Status: SHIPPED | OUTPATIENT
Start: 2024-03-17

## 2024-03-17 RX ORDER — AMOXICILLIN 875 MG/1
875 TABLET, COATED ORAL 2 TIMES DAILY
Qty: 14 TABLET | Refills: 0 | Status: SHIPPED | OUTPATIENT
Start: 2024-03-17 | End: 2024-03-24

## 2024-03-17 ASSESSMENT — ENCOUNTER SYMPTOMS
SINUS PAIN: 1
SORE THROAT: 1
FEVER: 0
MYALGIAS: 1
CHILLS: 0

## 2024-03-17 ASSESSMENT — FIBROSIS 4 INDEX: FIB4 SCORE: 0.69

## 2024-03-17 NOTE — PROGRESS NOTES
Subjective:   Marcela Tiwari is a 48 y.o. female who presents today with   Chief Complaint   Patient presents with    Sinus Problem     3 days     Congestion     3 days    Pharyngitis     3 days      Pharyngitis   This is a new problem. Episode onset: 3 days. The problem has been unchanged. There has been no fever. Associated symptoms include congestion. She has tried nothing for the symptoms. The treatment provided no relief.     PMH:  has a past medical history of Cancer (HCC) (2006) and Headache, classical migraine.  MEDS:   Current Outpatient Medications:     amoxicillin (AMOXIL) 875 MG tablet, Take 1 Tablet by mouth 2 times a day for 7 days., Disp: 14 Tablet, Rfl: 0    predniSONE (DELTASONE) 20 MG Tab, Take 1 tab once daily for 5 days., Disp: 10 Tablet, Rfl: 0    sumatriptan (IMITREX) 20 MG/ACT nasal spray, Take 20 mg at the onset of aura/HA; may re-dose x1 after 2 hrs if HA persists; MDD: 40 mg; do not use >2 days/week., Disp: 11 Each, Rfl: 11    escitalopram (LEXAPRO) 10 MG Tab, Take 10 mg by mouth every day., Disp: , Rfl:     lidocaine (XYLOCAINE) 2 % Solution, Take 5 mL by mouth every four hours as needed for Throat/Mouth Pain. (Patient not taking: Reported on 3/17/2024), Disp: 100 mL, Rfl: 0    benzonatate (TESSALON) 100 MG Cap, Take 1 Capsule by mouth 3 times a day as needed for Cough. (Patient not taking: Reported on 1/16/2024), Disp: 60 Capsule, Rfl: 0    EMGALITY 120 MG/ML Solution Auto-injector, INJECT 120MG UNDER THE SKIN 1  TIME EVERY MONTH (Patient not taking: Reported on 3/17/2024), Disp: 1 mL, Rfl: 11  ALLERGIES:   Allergies   Allergen Reactions    Levonorgestrel-Ethinyl Estrad Unspecified    Seasonal Runny Nose     Seasonal allergies     SURGHX:   Past Surgical History:   Procedure Laterality Date    OTHER  2019    sinus surgery    AMPUTATION, BELOW THE KNEE  2011    BUNIONECTOMY       SOCHX:  reports that she has never smoked. She has never used smokeless tobacco. She reports  "current alcohol use of about 2.0 oz of alcohol per week. She reports that she does not use drugs.  FH: Reviewed with patient, not pertinent to this visit.     Review of Systems   Constitutional:  Negative for chills and fever.   HENT:  Positive for congestion, sinus pain and sore throat.    Musculoskeletal:  Positive for myalgias.      Objective:   /72   Pulse 88   Temp 36.7 °C (98 °F) (Temporal)   Resp 20   Ht 1.803 m (5' 11\")   Wt 93 kg (205 lb)   SpO2 97%   BMI 28.59 kg/m²   Physical Exam  Vitals and nursing note reviewed.   Constitutional:       General: She is not in acute distress.     Appearance: Normal appearance. She is well-developed. She is not ill-appearing or toxic-appearing.   HENT:      Head: Normocephalic and atraumatic.      Right Ear: Hearing, tympanic membrane and ear canal normal.      Left Ear: Hearing and ear canal normal. A middle ear effusion is present. Tympanic membrane is erythematous.      Mouth/Throat:      Pharynx: Uvula midline. Posterior oropharyngeal erythema present. No uvula swelling.      Tonsils: No tonsillar exudate or tonsillar abscesses.   Cardiovascular:      Rate and Rhythm: Normal rate and regular rhythm.      Heart sounds: Normal heart sounds.   Pulmonary:      Effort: Pulmonary effort is normal.      Breath sounds: Normal breath sounds.   Musculoskeletal:      Comments: Normal movement in all 4 extremities   Skin:     General: Skin is warm and dry.   Neurological:      Mental Status: She is alert.      Coordination: Coordination normal.   Psychiatric:         Mood and Affect: Mood normal.       STREP A -  COVID -  FLU -  RSV -    Assessment/Plan:   Assessment    1. Sore throat  - POCT CoV-2, Flu A/B, RSV by PCR  - POCT GROUP A STREP, PCR    2. Non-recurrent acute suppurative otitis media of left ear without spontaneous rupture of tympanic membrane  - amoxicillin (AMOXIL) 875 MG tablet; Take 1 Tablet by mouth 2 times a day for 7 days.  Dispense: 14 Tablet; " Refill: 0  - predniSONE (DELTASONE) 20 MG Tab; Take 1 tab once daily for 5 days.  Dispense: 10 Tablet; Refill: 0    3. Sinus congestion  - predniSONE (DELTASONE) 20 MG Tab; Take 1 tab once daily for 5 days.  Dispense: 10 Tablet; Refill: 0    Symptoms and presentation at this time appear consistent with left-sided ear infection and we will treat accordingly with antibiotics.    Patient requesting steroids as well as she states she has had those in the past with antibiotics with significant relief of sinus pressure and ear pain.  She is traveling to [place] tomorrow on her spring break.    Differential diagnosis, natural history, supportive care, and indications for immediate follow-up discussed.   Patient given instructions and understanding of medications and treatment.    If not improving in 3-5 days, F/U with PCP or return to  if symptoms worsen.    Patient agreeable to plan.      Please note that this dictation was created using voice recognition software. I have made every reasonable attempt to correct obvious errors, but I expect that there are errors of grammar and possibly content that I did not discover before finalizing the note.    Yuval Loomis PA-C

## 2024-04-09 ENCOUNTER — APPOINTMENT (OUTPATIENT)
Dept: NEUROLOGY | Facility: MEDICAL CENTER | Age: 49
End: 2024-04-09
Attending: PSYCHIATRY & NEUROLOGY
Payer: COMMERCIAL

## 2024-04-11 ENCOUNTER — APPOINTMENT (OUTPATIENT)
Dept: NEUROLOGY | Facility: MEDICAL CENTER | Age: 49
End: 2024-04-11
Attending: PSYCHIATRY & NEUROLOGY
Payer: COMMERCIAL

## 2024-04-16 ENCOUNTER — OFFICE VISIT (OUTPATIENT)
Dept: NEUROLOGY | Facility: MEDICAL CENTER | Age: 49
End: 2024-04-16
Attending: PSYCHIATRY & NEUROLOGY
Payer: COMMERCIAL

## 2024-04-16 VITALS
HEIGHT: 71 IN | SYSTOLIC BLOOD PRESSURE: 104 MMHG | WEIGHT: 207.89 LBS | RESPIRATION RATE: 16 BRPM | DIASTOLIC BLOOD PRESSURE: 62 MMHG | TEMPERATURE: 97.5 F | HEART RATE: 76 BPM | BODY MASS INDEX: 29.1 KG/M2 | OXYGEN SATURATION: 96 %

## 2024-04-16 DIAGNOSIS — G43.E09 CHRONIC MIGRAINE WITH AURA WITHOUT STATUS MIGRAINOSUS, NOT INTRACTABLE: Primary | ICD-10-CM

## 2024-04-16 PROCEDURE — 64615 CHEMODENERV MUSC MIGRAINE: CPT | Performed by: PSYCHIATRY & NEUROLOGY

## 2024-04-16 PROCEDURE — 700111 HCHG RX REV CODE 636 W/ 250 OVERRIDE (IP): Mod: JZ | Performed by: PSYCHIATRY & NEUROLOGY

## 2024-04-16 PROCEDURE — 700101 HCHG RX REV CODE 250: Performed by: PSYCHIATRY & NEUROLOGY

## 2024-04-16 RX ADMIN — SODIUM CHLORIDE 155 UNITS: 9 INJECTION, SOLUTION INTRAMUSCULAR; INTRAVENOUS; SUBCUTANEOUS at 11:13

## 2024-04-16 ASSESSMENT — FIBROSIS 4 INDEX: FIB4 SCORE: 0.69

## 2024-05-04 ENCOUNTER — HOSPITAL ENCOUNTER (OUTPATIENT)
Dept: LAB | Facility: MEDICAL CENTER | Age: 49
End: 2024-05-04
Attending: STUDENT IN AN ORGANIZED HEALTH CARE EDUCATION/TRAINING PROGRAM
Payer: COMMERCIAL

## 2024-05-04 LAB
25(OH)D3 SERPL-MCNC: 26 NG/ML (ref 30–100)
ALBUMIN SERPL BCP-MCNC: 4.8 G/DL (ref 3.2–4.9)
ALBUMIN/GLOB SERPL: 1.7 G/DL
ALP SERPL-CCNC: 62 U/L (ref 30–99)
ALT SERPL-CCNC: 14 U/L (ref 2–50)
ANION GAP SERPL CALC-SCNC: 11 MMOL/L (ref 7–16)
AST SERPL-CCNC: 17 U/L (ref 12–45)
BASOPHILS # BLD AUTO: 0.6 % (ref 0–1.8)
BASOPHILS # BLD AUTO: 0.9 % (ref 0–1.8)
BASOPHILS # BLD: 0.03 K/UL (ref 0–0.12)
BASOPHILS # BLD: 0.05 K/UL (ref 0–0.12)
BILIRUB SERPL-MCNC: 0.3 MG/DL (ref 0.1–1.5)
BUN SERPL-MCNC: 13 MG/DL (ref 8–22)
CALCIUM ALBUM COR SERPL-MCNC: 9.3 MG/DL (ref 8.5–10.5)
CALCIUM SERPL-MCNC: 9.9 MG/DL (ref 8.5–10.5)
CHLORIDE SERPL-SCNC: 103 MMOL/L (ref 96–112)
CHOLEST SERPL-MCNC: 205 MG/DL (ref 100–199)
CO2 SERPL-SCNC: 27 MMOL/L (ref 20–33)
CREAT SERPL-MCNC: 0.78 MG/DL (ref 0.5–1.4)
CRP SERPL HS-MCNC: <0.3 MG/DL (ref 0–0.75)
EOSINOPHIL # BLD AUTO: 0.25 K/UL (ref 0–0.51)
EOSINOPHIL # BLD AUTO: 0.26 K/UL (ref 0–0.51)
EOSINOPHIL NFR BLD: 4.8 % (ref 0–6.9)
EOSINOPHIL NFR BLD: 4.8 % (ref 0–6.9)
ERYTHROCYTE [DISTWIDTH] IN BLOOD BY AUTOMATED COUNT: 43.1 FL (ref 35.9–50)
ERYTHROCYTE [DISTWIDTH] IN BLOOD BY AUTOMATED COUNT: 44.4 FL (ref 35.9–50)
ERYTHROCYTE [SEDIMENTATION RATE] IN BLOOD BY WESTERGREN METHOD: 9 MM/HOUR (ref 0–25)
EST. AVERAGE GLUCOSE BLD GHB EST-MCNC: 111 MG/DL
FASTING STATUS PATIENT QL REPORTED: NORMAL
GFR SERPLBLD CREATININE-BSD FMLA CKD-EPI: 95 ML/MIN/1.73 M 2
GLOBULIN SER CALC-MCNC: 2.8 G/DL (ref 1.9–3.5)
GLUCOSE SERPL-MCNC: 90 MG/DL (ref 65–99)
HBA1C MFR BLD: 5.5 % (ref 4–5.6)
HCT VFR BLD AUTO: 43.1 % (ref 37–47)
HCT VFR BLD AUTO: 43.4 % (ref 37–47)
HDLC SERPL-MCNC: 75 MG/DL
HGB BLD-MCNC: 14.2 G/DL (ref 12–16)
HGB BLD-MCNC: 14.2 G/DL (ref 12–16)
IMM GRANULOCYTES # BLD AUTO: 0.02 K/UL (ref 0–0.11)
IMM GRANULOCYTES # BLD AUTO: 0.02 K/UL (ref 0–0.11)
IMM GRANULOCYTES NFR BLD AUTO: 0.4 % (ref 0–0.9)
IMM GRANULOCYTES NFR BLD AUTO: 0.4 % (ref 0–0.9)
LDLC SERPL CALC-MCNC: 115 MG/DL
LYMPHOCYTES # BLD AUTO: 1.62 K/UL (ref 1–4.8)
LYMPHOCYTES # BLD AUTO: 1.65 K/UL (ref 1–4.8)
LYMPHOCYTES NFR BLD: 30.1 % (ref 22–41)
LYMPHOCYTES NFR BLD: 31.9 % (ref 22–41)
MCH RBC QN AUTO: 30.5 PG (ref 27–33)
MCH RBC QN AUTO: 30.9 PG (ref 27–33)
MCHC RBC AUTO-ENTMCNC: 32.7 G/DL (ref 32.2–35.5)
MCHC RBC AUTO-ENTMCNC: 32.9 G/DL (ref 32.2–35.5)
MCV RBC AUTO: 92.5 FL (ref 81.4–97.8)
MCV RBC AUTO: 94.3 FL (ref 81.4–97.8)
MONOCYTES # BLD AUTO: 0.31 K/UL (ref 0–0.85)
MONOCYTES # BLD AUTO: 0.38 K/UL (ref 0–0.85)
MONOCYTES NFR BLD AUTO: 6 % (ref 0–13.4)
MONOCYTES NFR BLD AUTO: 7.1 % (ref 0–13.4)
NEUTROPHILS # BLD AUTO: 2.91 K/UL (ref 1.82–7.42)
NEUTROPHILS # BLD AUTO: 3.06 K/UL (ref 1.82–7.42)
NEUTROPHILS NFR BLD: 56.3 % (ref 44–72)
NEUTROPHILS NFR BLD: 56.7 % (ref 44–72)
NRBC # BLD AUTO: 0 K/UL
NRBC # BLD AUTO: 0 K/UL
NRBC BLD-RTO: 0 /100 WBC (ref 0–0.2)
NRBC BLD-RTO: 0 /100 WBC (ref 0–0.2)
PLATELET # BLD AUTO: 252 K/UL (ref 164–446)
PLATELET # BLD AUTO: 256 K/UL (ref 164–446)
PMV BLD AUTO: 10.1 FL (ref 9–12.9)
PMV BLD AUTO: 10.1 FL (ref 9–12.9)
POTASSIUM SERPL-SCNC: 4.1 MMOL/L (ref 3.6–5.5)
PROT SERPL-MCNC: 7.6 G/DL (ref 6–8.2)
RBC # BLD AUTO: 4.6 M/UL (ref 4.2–5.4)
RBC # BLD AUTO: 4.66 M/UL (ref 4.2–5.4)
SODIUM SERPL-SCNC: 141 MMOL/L (ref 135–145)
TRIGL SERPL-MCNC: 73 MG/DL (ref 0–149)
TSH SERPL DL<=0.005 MIU/L-ACNC: 1.7 UIU/ML (ref 0.38–5.33)
WBC # BLD AUTO: 5.2 K/UL (ref 4.8–10.8)
WBC # BLD AUTO: 5.4 K/UL (ref 4.8–10.8)

## 2024-05-06 LAB
IGA SERPL-MCNC: 266 MG/DL (ref 68–408)
IGG SERPL-MCNC: 1105 MG/DL (ref 768–1632)
IGG1 SER-MCNC: 474 MG/DL (ref 240–1118)
IGG2 SER-MCNC: 437 MG/DL (ref 124–549)
IGG3 SER-MCNC: 56 MG/DL (ref 21–134)
IGG4 SER-MCNC: 101 MG/DL (ref 1–123)
IGM SERPL-MCNC: 124 MG/DL (ref 35–263)
NUCLEAR IGG SER QL IA: NORMAL

## 2024-05-07 LAB
C DIPHTHERIAE IGG SER-ACNC: 0.1 IU/ML
C TETANI TOXOID IGG SERPL IA-ACNC: 3 IU/ML
HAEM INFLU B IGG SER-MCNC: 0 UG/ML

## 2024-05-09 LAB
S PN DA SERO 19F IGG SER-MCNC: 5.93 UG/ML
S PNEUM DA 1 IGG SER-MCNC: 0.54 UG/ML
S PNEUM DA 12F IGG SER-MCNC: 0.31 UG/ML
S PNEUM DA 14 IGG SER-MCNC: 0.78 UG/ML
S PNEUM DA 18C IGG SER-MCNC: 3.35 UG/ML
S PNEUM DA 23F IGG SER-MCNC: 0.28 UG/ML
S PNEUM DA 3 IGG SER-MCNC: 1.32 UG/ML
S PNEUM DA 4 IGG SER-MCNC: 0.66 UG/ML
S PNEUM DA 5 IGG SER-MCNC: 0.53 UG/ML
S PNEUM DA 6B IGG SER-MCNC: 0.96 UG/ML
S PNEUM DA 7F IGG SER-MCNC: 0.37 UG/ML
S PNEUM DA 8 IGG SER-MCNC: 0.71 UG/ML
S PNEUM DA 9N IGG SER-MCNC: 0.42 UG/ML
S PNEUM DA 9V IGG SER-MCNC: 0.15 UG/ML
S PNEUM SEROTYPE IGG SER-IMP: NORMAL

## 2024-06-12 DIAGNOSIS — G43.E09 CHRONIC MIGRAINE WITH AURA: ICD-10-CM

## 2024-06-13 NOTE — TELEPHONE ENCOUNTER
Received request via: Pharmacy    Medication Name/Dosage EMGALITY 120 MG/ML Solution Auto-injector     When was medication last prescribed 8/2/23    How many refills were previously provided 11    How many Refills does he patient have left from last prescription     Was the patient seen in the last year in this department? Yes   Date of last office visit 4/16/24     Per last Neurology Office Visit, when was the date of next follow up visit set for?                            Date of office visit follow up request 7/16/24    Does the patient have an upcoming appointment? Yes   If yes, when 7/9/24             If no, schedule appointment     Does the patient have [facility] Plus and need 100 day supply (blood pressure, diabetes and cholesterol meds only)? Medication is not for cholesterol, blood pressure or diabetes

## 2024-06-15 RX ORDER — GALCANEZUMAB 120 MG/ML
1 INJECTION, SOLUTION SUBCUTANEOUS
Qty: 3 ML | Refills: 3 | Status: SHIPPED | OUTPATIENT
Start: 2024-06-15 | End: 2025-05-12

## 2024-07-09 ENCOUNTER — OFFICE VISIT (OUTPATIENT)
Dept: NEUROLOGY | Facility: MEDICAL CENTER | Age: 49
End: 2024-07-09
Attending: PSYCHIATRY & NEUROLOGY
Payer: COMMERCIAL

## 2024-07-09 VITALS
TEMPERATURE: 97.9 F | HEIGHT: 71 IN | WEIGHT: 208.11 LBS | DIASTOLIC BLOOD PRESSURE: 48 MMHG | RESPIRATION RATE: 16 BRPM | HEART RATE: 78 BPM | OXYGEN SATURATION: 98 % | BODY MASS INDEX: 29.14 KG/M2 | SYSTOLIC BLOOD PRESSURE: 96 MMHG

## 2024-07-09 DIAGNOSIS — G43.E09 CHRONIC MIGRAINE WITH AURA WITHOUT STATUS MIGRAINOSUS, NOT INTRACTABLE: Primary | ICD-10-CM

## 2024-07-09 PROCEDURE — 700101 HCHG RX REV CODE 250

## 2024-07-09 PROCEDURE — 64615 CHEMODENERV MUSC MIGRAINE: CPT | Performed by: PSYCHIATRY & NEUROLOGY

## 2024-07-09 PROCEDURE — 700111 HCHG RX REV CODE 636 W/ 250 OVERRIDE (IP): Mod: JZ

## 2024-07-09 PROCEDURE — 3074F SYST BP LT 130 MM HG: CPT | Performed by: PSYCHIATRY & NEUROLOGY

## 2024-07-09 PROCEDURE — 3078F DIAST BP <80 MM HG: CPT | Performed by: PSYCHIATRY & NEUROLOGY

## 2024-07-09 RX ADMIN — SODIUM CHLORIDE 155 UNITS: 9 INJECTION INTRAMUSCULAR; INTRAVENOUS; SUBCUTANEOUS at 14:26

## 2024-07-09 ASSESSMENT — FIBROSIS 4 INDEX: FIB4 SCORE: 0.83

## 2024-07-22 ENCOUNTER — TELEMEDICINE (OUTPATIENT)
Dept: NEUROLOGY | Facility: MEDICAL CENTER | Age: 49
End: 2024-07-22
Attending: PSYCHIATRY & NEUROLOGY
Payer: COMMERCIAL

## 2024-07-22 VITALS — WEIGHT: 206 LBS | HEIGHT: 71 IN | BODY MASS INDEX: 28.84 KG/M2

## 2024-07-22 DIAGNOSIS — G43.E09 CHRONIC MIGRAINE WITH AURA WITHOUT STATUS MIGRAINOSUS, NOT INTRACTABLE: ICD-10-CM

## 2024-07-22 PROCEDURE — 99213 OFFICE O/P EST LOW 20 MIN: CPT | Mod: 95 | Performed by: PSYCHIATRY & NEUROLOGY

## 2024-07-22 RX ORDER — PROGESTERONE 100 MG/1
100 CAPSULE ORAL
COMMUNITY
Start: 2024-07-10

## 2024-07-22 RX ORDER — SUMATRIPTAN 20 MG/1
SPRAY NASAL
Qty: 6 EACH | Refills: 11 | Status: SHIPPED | OUTPATIENT
Start: 2024-07-22 | End: 2025-07-22

## 2024-07-22 RX ORDER — ESTRADIOL 0.04 MG/D
1 PATCH, EXTENDED RELEASE TRANSDERMAL
COMMUNITY
Start: 2024-07-10

## 2024-07-22 ASSESSMENT — FIBROSIS 4 INDEX: FIB4 SCORE: 0.83

## 2024-08-13 ENCOUNTER — HOSPITAL ENCOUNTER (OUTPATIENT)
Dept: LAB | Facility: MEDICAL CENTER | Age: 49
End: 2024-08-13
Attending: INTERNAL MEDICINE
Payer: COMMERCIAL

## 2024-08-13 ENCOUNTER — HOSPITAL ENCOUNTER (OUTPATIENT)
Dept: LAB | Facility: MEDICAL CENTER | Age: 49
End: 2024-08-13
Attending: STUDENT IN AN ORGANIZED HEALTH CARE EDUCATION/TRAINING PROGRAM
Payer: COMMERCIAL

## 2024-08-13 LAB
25(OH)D3 SERPL-MCNC: 37 NG/ML (ref 30–100)
ALBUMIN SERPL BCP-MCNC: 4.3 G/DL (ref 3.2–4.9)
ALBUMIN/GLOB SERPL: 1.7 G/DL
ALP SERPL-CCNC: 63 U/L (ref 30–99)
ALT SERPL-CCNC: 21 U/L (ref 2–50)
ANION GAP SERPL CALC-SCNC: 13 MMOL/L (ref 7–16)
AST SERPL-CCNC: 18 U/L (ref 12–45)
BILIRUB SERPL-MCNC: 0.3 MG/DL (ref 0.1–1.5)
BUN SERPL-MCNC: 18 MG/DL (ref 8–22)
CALCIUM ALBUM COR SERPL-MCNC: 9.1 MG/DL (ref 8.5–10.5)
CALCIUM SERPL-MCNC: 9.3 MG/DL (ref 8.5–10.5)
CHLORIDE SERPL-SCNC: 101 MMOL/L (ref 96–112)
CO2 SERPL-SCNC: 23 MMOL/L (ref 20–33)
CREAT SERPL-MCNC: 0.64 MG/DL (ref 0.5–1.4)
FERRITIN SERPL-MCNC: 48.7 NG/ML (ref 10–291)
GFR SERPLBLD CREATININE-BSD FMLA CKD-EPI: 110 ML/MIN/1.73 M 2
GLOBULIN SER CALC-MCNC: 2.6 G/DL (ref 1.9–3.5)
GLUCOSE SERPL-MCNC: 92 MG/DL (ref 65–99)
IRON SATN MFR SERPL: 45 % (ref 15–55)
IRON SERPL-MCNC: 126 UG/DL (ref 40–170)
POTASSIUM SERPL-SCNC: 4.2 MMOL/L (ref 3.6–5.5)
PROT SERPL-MCNC: 6.9 G/DL (ref 6–8.2)
SODIUM SERPL-SCNC: 137 MMOL/L (ref 135–145)
TIBC SERPL-MCNC: 280 UG/DL (ref 250–450)
UIBC SERPL-MCNC: 154 UG/DL (ref 110–370)

## 2024-08-13 PROCEDURE — 83550 IRON BINDING TEST: CPT

## 2024-08-13 PROCEDURE — 82728 ASSAY OF FERRITIN: CPT

## 2024-08-13 PROCEDURE — 86317 IMMUNOASSAY INFECTIOUS AGENT: CPT | Mod: 91

## 2024-08-13 PROCEDURE — 82306 VITAMIN D 25 HYDROXY: CPT

## 2024-08-13 PROCEDURE — 83540 ASSAY OF IRON: CPT

## 2024-08-13 PROCEDURE — 36415 COLL VENOUS BLD VENIPUNCTURE: CPT

## 2024-08-13 PROCEDURE — 80053 COMPREHEN METABOLIC PANEL: CPT

## 2024-08-15 LAB
S PN DA SERO 19F IGG SER-MCNC: 3.82 UG/ML
S PNEUM DA 1 IGG SER-MCNC: 3.15 UG/ML
S PNEUM DA 10A IGG SER-MCNC: 1.86 UG/ML
S PNEUM DA 11A IGG SER-MCNC: >3.45 UG/ML
S PNEUM DA 12F IGG SER-MCNC: 0.65 UG/ML
S PNEUM DA 14 IGG SER-MCNC: 6.69 UG/ML
S PNEUM DA 15B IGG SER-MCNC: >27.72 UG/ML
S PNEUM DA 17F IGG SER-MCNC: 0.24 UG/ML
S PNEUM DA 18C IGG SER-MCNC: >11.15 UG/ML
S PNEUM DA 19A IGG SER-MCNC: 2.5 UG/ML
S PNEUM DA 2 IGG SER-MCNC: 0.24 UG/ML
S PNEUM DA 20A IGG SER-MCNC: 0.51 UG/ML
S PNEUM DA 22F IGG SER-MCNC: 2.32 UG/ML
S PNEUM DA 23F IGG SER-MCNC: 6.25 UG/ML
S PNEUM DA 3 IGG SER-MCNC: 1.16 UG/ML
S PNEUM DA 33F IGG SER-MCNC: 5.78 UG/ML
S PNEUM DA 4 IGG SER-MCNC: 3.63 UG/ML
S PNEUM DA 5 IGG SER-MCNC: >21.21 UG/ML
S PNEUM DA 6B IGG SER-MCNC: 2.02 UG/ML
S PNEUM DA 7F IGG SER-MCNC: 2.8 UG/ML
S PNEUM DA 8 IGG SER-MCNC: 0.86 UG/ML
S PNEUM DA 9N IGG SER-MCNC: 2.59 UG/ML
S PNEUM DA 9V IGG SER-MCNC: 9.15 UG/ML
S PNEUM SEROTYPE IGG SER-IMP: NORMAL

## 2024-09-06 ENCOUNTER — APPOINTMENT (RX ONLY)
Dept: URBAN - METROPOLITAN AREA CLINIC 15 | Facility: CLINIC | Age: 49
Setting detail: DERMATOLOGY
End: 2024-09-06

## 2024-09-06 DIAGNOSIS — D18.0 HEMANGIOMA: ICD-10-CM

## 2024-09-06 DIAGNOSIS — D22 MELANOCYTIC NEVI: ICD-10-CM

## 2024-09-06 DIAGNOSIS — L81.4 OTHER MELANIN HYPERPIGMENTATION: ICD-10-CM

## 2024-09-06 PROBLEM — D22.61 MELANOCYTIC NEVI OF RIGHT UPPER LIMB, INCLUDING SHOULDER: Status: ACTIVE | Noted: 2024-09-06

## 2024-09-06 PROBLEM — D22.5 MELANOCYTIC NEVI OF TRUNK: Status: ACTIVE | Noted: 2024-09-06

## 2024-09-06 PROBLEM — D22.39 MELANOCYTIC NEVI OF OTHER PARTS OF FACE: Status: ACTIVE | Noted: 2024-09-06

## 2024-09-06 PROBLEM — D22.71 MELANOCYTIC NEVI OF RIGHT LOWER LIMB, INCLUDING HIP: Status: ACTIVE | Noted: 2024-09-06

## 2024-09-06 PROBLEM — D22.62 MELANOCYTIC NEVI OF LEFT UPPER LIMB, INCLUDING SHOULDER: Status: ACTIVE | Noted: 2024-09-06

## 2024-09-06 PROBLEM — D18.01 HEMANGIOMA OF SKIN AND SUBCUTANEOUS TISSUE: Status: ACTIVE | Noted: 2024-09-06

## 2024-09-06 PROBLEM — D22.72 MELANOCYTIC NEVI OF LEFT LOWER LIMB, INCLUDING HIP: Status: ACTIVE | Noted: 2024-09-06

## 2024-09-06 PROCEDURE — ? COUNSELING

## 2024-09-06 PROCEDURE — 99213 OFFICE O/P EST LOW 20 MIN: CPT

## 2024-09-06 ASSESSMENT — LOCATION DETAILED DESCRIPTION DERM
LOCATION DETAILED: LEFT VENTRAL LATERAL PROXIMAL FOREARM
LOCATION DETAILED: RIGHT SUPERIOR MEDIAL BUCCAL CHEEK
LOCATION DETAILED: RIGHT PROXIMAL PRETIBIAL REGION
LOCATION DETAILED: RIGHT SUPERIOR UPPER BACK
LOCATION DETAILED: SUBXIPHOID
LOCATION DETAILED: RIGHT VENTRAL PROXIMAL FOREARM
LOCATION DETAILED: RIGHT CENTRAL EYEBROW
LOCATION DETAILED: RIGHT MEDIAL BREAST 5-6:00 REGION
LOCATION DETAILED: LEFT MEDIAL BREAST 9-10:00 REGION
LOCATION DETAILED: RIGHT POPLITEAL SKIN
LOCATION DETAILED: INFERIOR THORACIC SPINE
LOCATION DETAILED: RIGHT LATERAL SUPERIOR CHEST
LOCATION DETAILED: RIGHT DISTAL POSTERIOR UPPER ARM
LOCATION DETAILED: LEFT POPLITEAL SKIN
LOCATION DETAILED: LEFT PROXIMAL PRETIBIAL REGION
LOCATION DETAILED: MIDDLE STERNUM
LOCATION DETAILED: RIGHT PROXIMAL DORSAL FOREARM
LOCATION DETAILED: LEFT SUPERIOR MEDIAL BUCCAL CHEEK
LOCATION DETAILED: RIGHT ANTERIOR DISTAL THIGH
LOCATION DETAILED: LEFT LATERAL ELBOW
LOCATION DETAILED: LEFT DISTAL DORSAL FOREARM

## 2024-09-06 ASSESSMENT — LOCATION SIMPLE DESCRIPTION DERM
LOCATION SIMPLE: RIGHT EYEBROW
LOCATION SIMPLE: LEFT ELBOW
LOCATION SIMPLE: RIGHT FOREARM
LOCATION SIMPLE: RIGHT POPLITEAL SKIN
LOCATION SIMPLE: RIGHT CHEEK
LOCATION SIMPLE: RIGHT UPPER BACK
LOCATION SIMPLE: LEFT FOREARM
LOCATION SIMPLE: RIGHT BREAST
LOCATION SIMPLE: ABDOMEN
LOCATION SIMPLE: LEFT BREAST
LOCATION SIMPLE: LEFT POPLITEAL SKIN
LOCATION SIMPLE: CHEST
LOCATION SIMPLE: RIGHT PRETIBIAL REGION
LOCATION SIMPLE: LEFT PRETIBIAL REGION
LOCATION SIMPLE: RIGHT THIGH
LOCATION SIMPLE: UPPER BACK
LOCATION SIMPLE: LEFT CHEEK
LOCATION SIMPLE: RIGHT POSTERIOR UPPER ARM

## 2024-09-06 ASSESSMENT — LOCATION ZONE DERM
LOCATION ZONE: LEG
LOCATION ZONE: FACE
LOCATION ZONE: ARM
LOCATION ZONE: TRUNK

## 2024-10-04 ENCOUNTER — HOSPITAL ENCOUNTER (OUTPATIENT)
Dept: LAB | Facility: MEDICAL CENTER | Age: 49
End: 2024-10-04
Attending: OBSTETRICS & GYNECOLOGY
Payer: COMMERCIAL

## 2024-10-04 PROCEDURE — 82670 ASSAY OF TOTAL ESTRADIOL: CPT

## 2024-10-04 PROCEDURE — 36415 COLL VENOUS BLD VENIPUNCTURE: CPT

## 2024-10-04 PROCEDURE — 84144 ASSAY OF PROGESTERONE: CPT

## 2024-10-04 PROCEDURE — 84270 ASSAY OF SEX HORMONE GLOBUL: CPT

## 2024-10-04 PROCEDURE — 84402 ASSAY OF FREE TESTOSTERONE: CPT

## 2024-10-04 PROCEDURE — 84403 ASSAY OF TOTAL TESTOSTERONE: CPT

## 2024-10-05 LAB
ESTRADIOL SERPL-MCNC: 45.7 PG/ML
PROGEST SERPL-MCNC: 2.91 NG/ML

## 2024-10-09 LAB
SHBG SERPL-SCNC: 64 NMOL/L (ref 25–122)
TESTOST FREE SERPL-MCNC: 8.3 PG/ML (ref 1.1–5.8)
TESTOST SERPL-MCNC: 75 NG/DL (ref 9–55)

## 2024-10-21 ENCOUNTER — TELEPHONE (OUTPATIENT)
Dept: NEUROLOGY | Facility: MEDICAL CENTER | Age: 49
End: 2024-10-21
Payer: COMMERCIAL

## 2024-10-22 ENCOUNTER — OFFICE VISIT (OUTPATIENT)
Dept: NEUROLOGY | Facility: MEDICAL CENTER | Age: 49
End: 2024-10-22
Attending: PSYCHIATRY & NEUROLOGY
Payer: COMMERCIAL

## 2024-10-22 VITALS
RESPIRATION RATE: 12 BRPM | SYSTOLIC BLOOD PRESSURE: 96 MMHG | BODY MASS INDEX: 28.98 KG/M2 | DIASTOLIC BLOOD PRESSURE: 62 MMHG | OXYGEN SATURATION: 98 % | HEART RATE: 77 BPM | TEMPERATURE: 97.7 F | WEIGHT: 207 LBS | HEIGHT: 71 IN

## 2024-10-22 DIAGNOSIS — G43.E09 CHRONIC MIGRAINE WITH AURA WITHOUT STATUS MIGRAINOSUS, NOT INTRACTABLE: Primary | ICD-10-CM

## 2024-10-22 PROCEDURE — 700111 HCHG RX REV CODE 636 W/ 250 OVERRIDE (IP): Mod: JZ

## 2024-10-22 PROCEDURE — 64615 CHEMODENERV MUSC MIGRAINE: CPT | Performed by: PSYCHIATRY & NEUROLOGY

## 2024-10-22 PROCEDURE — 3074F SYST BP LT 130 MM HG: CPT | Performed by: PSYCHIATRY & NEUROLOGY

## 2024-10-22 PROCEDURE — 3078F DIAST BP <80 MM HG: CPT | Performed by: PSYCHIATRY & NEUROLOGY

## 2024-10-22 PROCEDURE — 700101 HCHG RX REV CODE 250

## 2024-10-22 PROCEDURE — 99024 POSTOP FOLLOW-UP VISIT: CPT | Performed by: PSYCHIATRY & NEUROLOGY

## 2024-10-22 RX ADMIN — SODIUM CHLORIDE 155 UNITS: 9 INJECTION INTRAMUSCULAR; INTRAVENOUS; SUBCUTANEOUS at 10:19

## 2024-10-22 ASSESSMENT — PATIENT HEALTH QUESTIONNAIRE - PHQ9: CLINICAL INTERPRETATION OF PHQ2 SCORE: 0

## 2024-10-22 ASSESSMENT — FIBROSIS 4 INDEX: FIB4 SCORE: 0.72

## 2024-10-24 ENCOUNTER — APPOINTMENT (OUTPATIENT)
Dept: NEUROLOGY | Facility: MEDICAL CENTER | Age: 49
End: 2024-10-24
Attending: PSYCHIATRY & NEUROLOGY
Payer: COMMERCIAL

## 2024-11-27 DIAGNOSIS — G43.E09 CHRONIC MIGRAINE WITH AURA WITHOUT STATUS MIGRAINOSUS, NOT INTRACTABLE: Primary | ICD-10-CM

## 2024-11-27 RX ORDER — SUMATRIPTAN 50 MG/1
50 TABLET, FILM COATED ORAL
COMMUNITY
End: 2024-11-27 | Stop reason: SDUPTHER

## 2024-11-27 NOTE — TELEPHONE ENCOUNTER
Received request via: Pharmacy    Medication Name/Dosage Sumatriptan Succinate 50mg tablet     When was medication last prescribed 10/24/23    How many refills were previously provided 11    How many Refills does he patient have left from last prescription 0    Was the patient seen in the last year in this department? Yes   Date of last office visit 7/22/24     Per last Neurology Office Visit, when was the date of next follow up visit set for?  1 year                          Date of office visit follow up request 7/22/25     Does the patient have an upcoming appointment? Yes   If yes, when 1/14/25             If no, schedule appointment Scheduled     Does the patient have custodial Plus and need 100 day supply (blood pressure, diabetes and cholesterol meds only)? Patient does not have SCP

## 2024-11-30 RX ORDER — SUMATRIPTAN 50 MG/1
TABLET, FILM COATED ORAL
Qty: 12 TABLET | Refills: 11 | Status: SHIPPED | OUTPATIENT
Start: 2024-11-30 | End: 2025-11-30

## 2024-12-02 ENCOUNTER — TELEPHONE (OUTPATIENT)
Dept: PHARMACY | Facility: MEDICAL CENTER | Age: 49
End: 2024-12-02
Payer: COMMERCIAL

## 2024-12-02 NOTE — TELEPHONE ENCOUNTER
Received New Start  request via MSOT  for   SUMAtriptan (IMITREX) 50 MG Tab. (Quantity:12, Day Supply:30)     Insurance: [facility] Personal Care  Member ID:  [id number]  BIN: [id number]  PCN: [id number]  Group: [id number]     Ran Test claim via [place] & medication Pays for a $0 copay. Will outreach to patient to offer specialty pharmacy services and or release to preferred pharmacy    Will release to pt's preferred pharmacy on file.

## 2024-12-21 ENCOUNTER — HOSPITAL ENCOUNTER (OUTPATIENT)
Dept: LAB | Facility: MEDICAL CENTER | Age: 49
End: 2024-12-21
Attending: OBSTETRICS & GYNECOLOGY
Payer: COMMERCIAL

## 2024-12-21 LAB
BASOPHILS # BLD AUTO: 0.5 % (ref 0–1.8)
BASOPHILS # BLD: 0.03 K/UL (ref 0–0.12)
EOSINOPHIL # BLD AUTO: 0.22 K/UL (ref 0–0.51)
EOSINOPHIL NFR BLD: 3.4 % (ref 0–6.9)
ERYTHROCYTE [DISTWIDTH] IN BLOOD BY AUTOMATED COUNT: 44.5 FL (ref 35.9–50)
ESTRADIOL SERPL-MCNC: 91.4 PG/ML
FERRITIN SERPL-MCNC: 30.2 NG/ML (ref 10–291)
FSH SERPL-ACNC: 16.5 MIU/ML
HCT VFR BLD AUTO: 39 % (ref 37–47)
HGB BLD-MCNC: 12.8 G/DL (ref 12–16)
IMM GRANULOCYTES # BLD AUTO: 0.02 K/UL (ref 0–0.11)
IMM GRANULOCYTES NFR BLD AUTO: 0.3 % (ref 0–0.9)
IRON SATN MFR SERPL: 42 % (ref 15–55)
IRON SERPL-MCNC: 108 UG/DL (ref 40–170)
LYMPHOCYTES # BLD AUTO: 2.09 K/UL (ref 1–4.8)
LYMPHOCYTES NFR BLD: 32.3 % (ref 22–41)
MCH RBC QN AUTO: 30.5 PG (ref 27–33)
MCHC RBC AUTO-ENTMCNC: 32.8 G/DL (ref 32.2–35.5)
MCV RBC AUTO: 92.9 FL (ref 81.4–97.8)
MONOCYTES # BLD AUTO: 0.38 K/UL (ref 0–0.85)
MONOCYTES NFR BLD AUTO: 5.9 % (ref 0–13.4)
NEUTROPHILS # BLD AUTO: 3.74 K/UL (ref 1.82–7.42)
NEUTROPHILS NFR BLD: 57.6 % (ref 44–72)
NRBC # BLD AUTO: 0 K/UL
NRBC BLD-RTO: 0 /100 WBC (ref 0–0.2)
PLATELET # BLD AUTO: 265 K/UL (ref 164–446)
PMV BLD AUTO: 10 FL (ref 9–12.9)
PROGEST SERPL-MCNC: 3.75 NG/ML
RBC # BLD AUTO: 4.2 M/UL (ref 4.2–5.4)
TIBC SERPL-MCNC: 256 UG/DL (ref 250–450)
UIBC SERPL-MCNC: 148 UG/DL (ref 110–370)
WBC # BLD AUTO: 6.5 K/UL (ref 4.8–10.8)

## 2024-12-21 PROCEDURE — 82728 ASSAY OF FERRITIN: CPT

## 2024-12-21 PROCEDURE — 84144 ASSAY OF PROGESTERONE: CPT

## 2024-12-21 PROCEDURE — 84403 ASSAY OF TOTAL TESTOSTERONE: CPT

## 2024-12-21 PROCEDURE — 85025 COMPLETE CBC W/AUTO DIFF WBC: CPT

## 2024-12-21 PROCEDURE — 83540 ASSAY OF IRON: CPT

## 2024-12-21 PROCEDURE — 83001 ASSAY OF GONADOTROPIN (FSH): CPT

## 2024-12-21 PROCEDURE — 36415 COLL VENOUS BLD VENIPUNCTURE: CPT

## 2024-12-21 PROCEDURE — 82670 ASSAY OF TOTAL ESTRADIOL: CPT

## 2024-12-21 PROCEDURE — 83550 IRON BINDING TEST: CPT

## 2024-12-28 LAB — TESTOST SERPL-MCNC: 26 NG/DL (ref 9–55)

## 2025-01-09 ENCOUNTER — TELEPHONE (OUTPATIENT)
Dept: NEUROLOGY | Facility: MEDICAL CENTER | Age: 50
End: 2025-01-09
Payer: COMMERCIAL

## 2025-01-14 ENCOUNTER — OFFICE VISIT (OUTPATIENT)
Dept: NEUROLOGY | Facility: MEDICAL CENTER | Age: 50
End: 2025-01-14
Attending: PSYCHIATRY & NEUROLOGY
Payer: COMMERCIAL

## 2025-01-14 VITALS
OXYGEN SATURATION: 94 % | TEMPERATURE: 97.2 F | HEART RATE: 74 BPM | WEIGHT: 209.44 LBS | DIASTOLIC BLOOD PRESSURE: 70 MMHG | SYSTOLIC BLOOD PRESSURE: 118 MMHG | BODY MASS INDEX: 29.32 KG/M2 | HEIGHT: 71 IN | RESPIRATION RATE: 16 BRPM

## 2025-01-14 DIAGNOSIS — G43.E09 CHRONIC MIGRAINE WITH AURA WITHOUT STATUS MIGRAINOSUS, NOT INTRACTABLE: Primary | ICD-10-CM

## 2025-01-14 PROCEDURE — 3074F SYST BP LT 130 MM HG: CPT | Performed by: PSYCHIATRY & NEUROLOGY

## 2025-01-14 PROCEDURE — 64615 CHEMODENERV MUSC MIGRAINE: CPT | Performed by: PSYCHIATRY & NEUROLOGY

## 2025-01-14 PROCEDURE — 700101 HCHG RX REV CODE 250

## 2025-01-14 PROCEDURE — 3078F DIAST BP <80 MM HG: CPT | Performed by: PSYCHIATRY & NEUROLOGY

## 2025-01-14 PROCEDURE — 700111 HCHG RX REV CODE 636 W/ 250 OVERRIDE (IP): Mod: JZ

## 2025-01-14 RX ADMIN — SODIUM CHLORIDE 155 UNITS: 9 INJECTION, SOLUTION INTRAMUSCULAR; INTRAVENOUS; SUBCUTANEOUS at 16:39

## 2025-01-14 ASSESSMENT — FIBROSIS 4 INDEX: FIB4 SCORE: 0.7

## 2025-01-14 ASSESSMENT — PATIENT HEALTH QUESTIONNAIRE - PHQ9: CLINICAL INTERPRETATION OF PHQ2 SCORE: 0

## 2025-01-31 ENCOUNTER — HOSPITAL ENCOUNTER (OUTPATIENT)
Dept: RADIOLOGY | Facility: MEDICAL CENTER | Age: 50
End: 2025-01-31
Attending: OBSTETRICS & GYNECOLOGY
Payer: COMMERCIAL

## 2025-01-31 DIAGNOSIS — N93.9 HEMORRHAGE IN UTERUS: ICD-10-CM

## 2025-01-31 PROCEDURE — 76830 TRANSVAGINAL US NON-OB: CPT

## 2025-04-08 ENCOUNTER — OFFICE VISIT (OUTPATIENT)
Dept: NEUROLOGY | Facility: MEDICAL CENTER | Age: 50
End: 2025-04-08
Attending: PSYCHIATRY & NEUROLOGY
Payer: COMMERCIAL

## 2025-04-08 VITALS
OXYGEN SATURATION: 100 % | BODY MASS INDEX: 28.77 KG/M2 | RESPIRATION RATE: 16 BRPM | SYSTOLIC BLOOD PRESSURE: 102 MMHG | TEMPERATURE: 97.4 F | HEART RATE: 69 BPM | WEIGHT: 205.47 LBS | DIASTOLIC BLOOD PRESSURE: 66 MMHG | HEIGHT: 71 IN

## 2025-04-08 DIAGNOSIS — G43.E09 CHRONIC MIGRAINE WITH AURA WITHOUT STATUS MIGRAINOSUS, NOT INTRACTABLE: ICD-10-CM

## 2025-04-08 PROCEDURE — 64615 CHEMODENERV MUSC MIGRAINE: CPT | Performed by: PSYCHIATRY & NEUROLOGY

## 2025-04-08 PROCEDURE — 3074F SYST BP LT 130 MM HG: CPT | Performed by: PSYCHIATRY & NEUROLOGY

## 2025-04-08 PROCEDURE — 700111 HCHG RX REV CODE 636 W/ 250 OVERRIDE (IP): Mod: JZ

## 2025-04-08 PROCEDURE — 700101 HCHG RX REV CODE 250

## 2025-04-08 PROCEDURE — 3078F DIAST BP <80 MM HG: CPT | Performed by: PSYCHIATRY & NEUROLOGY

## 2025-04-08 ASSESSMENT — PATIENT HEALTH QUESTIONNAIRE - PHQ9: CLINICAL INTERPRETATION OF PHQ2 SCORE: 0

## 2025-04-08 ASSESSMENT — FIBROSIS 4 INDEX: FIB4 SCORE: 0.7

## 2025-04-09 RX ADMIN — SODIUM CHLORIDE 155 UNITS: 9 INJECTION, SOLUTION INTRAMUSCULAR; INTRAVENOUS; SUBCUTANEOUS at 07:15

## 2025-04-23 ENCOUNTER — PATIENT MESSAGE (OUTPATIENT)
Dept: NEUROLOGY | Facility: MEDICAL CENTER | Age: 50
End: 2025-04-23
Payer: COMMERCIAL

## 2025-04-23 DIAGNOSIS — G43.901 STATUS MIGRAINOSUS: Primary | ICD-10-CM

## 2025-04-23 RX ORDER — DEXAMETHASONE 2 MG/1
TABLET ORAL
Qty: 10 TABLET | Refills: 0 | Status: SHIPPED | OUTPATIENT
Start: 2025-04-23 | End: 2025-04-27

## 2025-04-24 DIAGNOSIS — G43.E09 CHRONIC MIGRAINE WITH AURA: ICD-10-CM

## 2025-04-25 NOTE — TELEPHONE ENCOUNTER
Received request via: Pharmacy    Medication Name/Dosage Emgality 120mg/mL    When was medication last prescribed 6/15/24    How many refills were previously provided 3    How many Refills does he patient have left from last prescription 0    Was the patient seen in the last year in this department? Yes   Date of last office visit 4/8/25     Per last Neurology Office Visit, when was the date of next follow up visit set for?                            Date of office visit follow up request 7/1/25     Does the patient have an upcoming appointment? Yes   If yes, when 7/1/25             If no, schedule appointment -    Does the patient have retirement Plus and need 100 day supply (blood pressure, diabetes and cholesterol meds only)? Patient does not have SCP

## 2025-04-28 ENCOUNTER — TELEPHONE (OUTPATIENT)
Dept: PHARMACY | Facility: MEDICAL CENTER | Age: 50
End: 2025-04-28
Payer: COMMERCIAL

## 2025-04-28 RX ORDER — GALCANEZUMAB 120 MG/ML
INJECTION, SOLUTION SUBCUTANEOUS
Qty: 3 ML | Refills: 3 | Status: SHIPPED | OUTPATIENT
Start: 2025-04-28 | End: 2026-04-28

## 2025-04-28 NOTE — TELEPHONE ENCOUNTER
Received New Start PA request via MSOT  for Galcanezumab-gnlm (EMGALITY) 120 MG/ML Solution Auto-injector. (Quantity:3, Day Supply:84)     Insurance: [employer] Personal [employer]  Member ID:  [id number]  BIN: [id number]  PCN: [id number]  Group: [id number]     Ran Test claim via [place] & medication  RTC RTS - will release to pt's preferred pharmacy on file.

## 2025-04-29 ENCOUNTER — PHARMACY VISIT (OUTPATIENT)
Dept: PHARMACY | Facility: MEDICAL CENTER | Age: 50
End: 2025-04-29
Payer: COMMERCIAL

## 2025-04-29 ENCOUNTER — NON-PROVIDER VISIT (OUTPATIENT)
Dept: NEUROLOGY | Facility: MEDICAL CENTER | Age: 50
End: 2025-04-29
Attending: PSYCHIATRY & NEUROLOGY
Payer: COMMERCIAL

## 2025-04-29 ENCOUNTER — PATIENT MESSAGE (OUTPATIENT)
Dept: NEUROLOGY | Facility: MEDICAL CENTER | Age: 50
End: 2025-04-29
Payer: COMMERCIAL

## 2025-04-29 DIAGNOSIS — G43.901 STATUS MIGRAINOSUS: Primary | ICD-10-CM

## 2025-04-29 DIAGNOSIS — G43.901 STATUS MIGRAINOSUS: ICD-10-CM

## 2025-04-29 PROCEDURE — 700111 HCHG RX REV CODE 636 W/ 250 OVERRIDE (IP): Mod: JZ

## 2025-04-29 PROCEDURE — RXMED WILLOW AMBULATORY MEDICATION CHARGE: Performed by: PSYCHIATRY & NEUROLOGY

## 2025-04-29 PROCEDURE — 96372 THER/PROPH/DIAG INJ SC/IM: CPT | Performed by: PSYCHIATRY & NEUROLOGY

## 2025-04-29 RX ORDER — LASMIDITAN 100 MG/1
TABLET ORAL
Qty: 2 TABLET | Refills: 0 | Status: SHIPPED | OUTPATIENT
Start: 2025-04-29 | End: 2025-05-29

## 2025-04-29 RX ORDER — KETOROLAC TROMETHAMINE 30 MG/ML
60 INJECTION, SOLUTION INTRAMUSCULAR; INTRAVENOUS ONCE
Status: COMPLETED | OUTPATIENT
Start: 2025-04-29 | End: 2025-04-29

## 2025-04-29 RX ADMIN — KETOROLAC TROMETHAMINE 60 MG: 30 INJECTION, SOLUTION INTRAMUSCULAR at 16:52

## 2025-04-29 NOTE — PROGRESS NOTES
Marcela Tiwari is a 49 y.o. female here for a non-provider visit for torodol  injection.    Reason for injection: migraine   Order in MAR?: Yes  Patient supplied?:No  Minimum interval has been met for this injection (per MAR order): Yes    Patient tolerated injection and no adverse effects were observed or reported: Yes    # of Administrations remaining in MAR: 0

## 2025-05-01 DIAGNOSIS — G43.901 STATUS MIGRAINOSUS: ICD-10-CM

## 2025-05-01 DIAGNOSIS — G43.E09 CHRONIC MIGRAINE WITH AURA WITHOUT STATUS MIGRAINOSUS, NOT INTRACTABLE: ICD-10-CM

## 2025-05-05 ENCOUNTER — OFFICE VISIT (OUTPATIENT)
Dept: NEUROLOGY | Facility: MEDICAL CENTER | Age: 50
End: 2025-05-05
Attending: PSYCHIATRY & NEUROLOGY
Payer: COMMERCIAL

## 2025-05-05 VITALS
OXYGEN SATURATION: 98 % | HEIGHT: 71 IN | TEMPERATURE: 91 F | DIASTOLIC BLOOD PRESSURE: 62 MMHG | HEART RATE: 98 BPM | WEIGHT: 207.67 LBS | SYSTOLIC BLOOD PRESSURE: 98 MMHG | RESPIRATION RATE: 16 BRPM | BODY MASS INDEX: 29.07 KG/M2

## 2025-05-05 DIAGNOSIS — G43.E09 CHRONIC MIGRAINE WITH AURA WITHOUT STATUS MIGRAINOSUS, NOT INTRACTABLE: Primary | ICD-10-CM

## 2025-05-05 DIAGNOSIS — G43.E09 CHRONIC MIGRAINE WITH AURA WITHOUT STATUS MIGRAINOSUS, NOT INTRACTABLE: ICD-10-CM

## 2025-05-05 DIAGNOSIS — R52 PHANTOM PAIN: ICD-10-CM

## 2025-05-05 DIAGNOSIS — G54.8 PHANTOM PAIN: ICD-10-CM

## 2025-05-05 PROCEDURE — 3074F SYST BP LT 130 MM HG: CPT | Performed by: PSYCHIATRY & NEUROLOGY

## 2025-05-05 PROCEDURE — 99214 OFFICE O/P EST MOD 30 MIN: CPT | Performed by: PSYCHIATRY & NEUROLOGY

## 2025-05-05 PROCEDURE — 99213 OFFICE O/P EST LOW 20 MIN: CPT | Performed by: PSYCHIATRY & NEUROLOGY

## 2025-05-05 PROCEDURE — 3078F DIAST BP <80 MM HG: CPT | Performed by: PSYCHIATRY & NEUROLOGY

## 2025-05-05 RX ORDER — KETOROLAC TROMETHAMINE 30 MG/ML
60 INJECTION, SOLUTION INTRAMUSCULAR; INTRAVENOUS ONCE
Qty: 20 ML | Refills: 0 | Status: SHIPPED | OUTPATIENT
Start: 2025-05-05 | End: 2025-05-06

## 2025-05-05 RX ORDER — GABAPENTIN 300 MG/1
600 CAPSULE ORAL 3 TIMES DAILY
COMMUNITY
End: 2025-05-05 | Stop reason: SDUPTHER

## 2025-05-05 RX ORDER — GABAPENTIN 300 MG/1
600 CAPSULE ORAL NIGHTLY
Qty: 180 CAPSULE | Refills: 3 | Status: SHIPPED | OUTPATIENT
Start: 2025-05-05 | End: 2026-04-30

## 2025-05-05 ASSESSMENT — FIBROSIS 4 INDEX: FIB4 SCORE: 0.7

## 2025-05-05 ASSESSMENT — PATIENT HEALTH QUESTIONNAIRE - PHQ9
SUM OF ALL RESPONSES TO PHQ QUESTIONS 1-9: 4
CLINICAL INTERPRETATION OF PHQ2 SCORE: 1
5. POOR APPETITE OR OVEREATING: 0 - NOT AT ALL

## 2025-05-05 NOTE — PROGRESS NOTES
"[facility] NEUROLOGY  GENERAL NEUROLOGY  FOLLOW-UP VISIT    CC: migraine    INTERVAL HISTORY:  Marcela Tiwari is a 49 y.o. woman with migraines as well as a history of migraine, Ludwig's sarcoma (diagnosed 2006), and s/p BKA (2011).  I last saw her in the clinic on 4/8/2025 at the time of Botox administration.  At that time I recommended she continue Botox.  Today, I followed up with her via Teams, and she provided the following interval history:    The following is a summary of headache symptoms, presented in my standard format:    Family History: father had headaches  Age at onset: 27-28  Location: variable, if due to stress: shoulders with radiation to the occiput and temples  Radiation: see above  Frequency: baseline: 2-3 days/week, lately: 1/month  Duration: baseline: without treatment: hasn't tried this, with treatment: 1 hours, lately: with treatment: 30 minutes  Headache Days/Month: variable, pain tends to occur in \"clusters\"  Quality: feels like her head is \"about to explode\" \"pressure\"  Intensity: baseline: 10/10, lately: with treatment: 7-8/10  Aura: some discomfort in the posterior cervical area  Photophobia/Phonophobia/Nausea/Vomiting: yes/yes/yes/once  Provoked by Physical Activity?:   Triggers: teaching passionately, alcohol  Associated Symptoms:   Autonomic Signs (such as ptosis, miosis, conjunctival injection, rhinorrhea, increased lacrimation):   Head Trauma:   Association with Menses:   ED Visits:   Hospitalizations:   Missed Work Days ( at [employer]):   Sleep: 9 hours, interrupted due to daughter w/ separation anxiety  Caffeine Intake: ~2 servings per day  Hydration: keeps well hydrated  Nutrition: tries not to skip meals  Exercise:   Analgesic Overuse:     Current Medication Regimen:  - Benadryl: takes 25 mg most evenings to promote sleep  - Botox: helpful  - coenzyme Q10: recently started this  - Emgality: very effective for 6 months, had a \"cluster\" at the end of 2/2022  - " "ibuprofen/Aleve: takes this at 14:00 or 15:00  - magnesium: helpful for sleep  - riboflavin: recently started this  - sumatriptan: 50 mg, effective, makes her tired  - sumatriptan: 20 mg IN, very effective, makes her tired    Medications Tried: Response  Preventive:  - amitriptyline: 10 mg caused her to feel \"very weird in the morning\"    Abortive:  - Excedrin  - ketorolac: helpful  - lasmiditan: received this, didn't need it  - methylprednisolone: helpful  - Nurtec: tried this in September, ineffective    Medications Not Tried:  - topiramate  - propranolol    MEDICATIONS:  Current Outpatient Medications   Medication Sig    ketorolac (TORADOL) 60 MG/2ML Solution Inject 2 mL into the shoulder, thigh, or buttocks one time for 1 dose.    Ubrogepant 100 MG Tab Take 100 mg at the onset of aura/HA; may re-dose x1 after 2 hrs if HA persists; MDD: 200 mg    gabapentin (NEURONTIN) 300 MG Cap Take 2 Capsules by mouth every evening for 360 days.    Lasmiditan Succinate (REYVOW) 100 MG Tab 100 mg as a single dose.  May increase to 100 or 200 mg as a single dose with subsequent attacks if needed. Maximum: One dose per 24 hours.  Do not administer lasmiditan unless patient can wait at least 8 hours between dosing and operating heavy machinery or driving.    EMGALITY 120 MG/ML Solution Auto-injector INJECT THE CONTENTS OF 1 PEN  SUBCUTANEOUSLY ONCE MONTHLY    SUMAtriptan (IMITREX) 50 MG Tab Take  mg at the onset of aura/HA; may re-dose x1 after 2 hrs if HA persists; MDD: 200 mg; do not use >2 days/week.    progesterone (PROMETRIUM) 100 MG Cap Take 100 mg by mouth at bedtime.    JONATHAN 0.0375 MG/24HR patch Place 1 Patch on the skin. APPLY 1 PATCH TOPICALLY TWICE A WEEK    sumatriptan (IMITREX) 20 MG/ACT nasal spray Take 20 mg at the onset of aura/HA; may re-dose x1 after 2 hrs if HA persists; MDD: 40 mg; do not use >2 days/week.     MEDICAL, SOCIAL, AND FAMILY HISTORY:  There is no change in the patient's ROS or medical, " social, or family histories since the previous visit on 4/8/2025.    REVIEW OF SYSTEMS:  A ROS was completed.  Pertinent positives and negatives were included in the HPI, above.  All other systems were reviewed and are negative.    PHYSICAL EXAM:  General/Medical:  - NAD    Neuro:  MENTAL STATUS: awake and alert; no deficits of speech or language; oriented to person, place, and time; affect was appropriate to situation; pleasant, cooperative    CRANIAL NERVES:    II: acuity: NT, fields: NT, pupils: NT, discs: NT    III/IV/VI: versions: grossly intact    V: facial sensation: NT    VII: facial expression: symmetric    VIII: hearing: intact to voice    IX/X: palate: NT    XI: shoulder shrug: NT    XII: tongue: NT    MOTOR:  - bulk: NT  - tone: NT  Upper Extremity Strength  (R/L)    NT   Elbow flexion NT   Elbow extension NT   Shoulder abduction NT     Lower Extremity Strength  (R/L)   Hip flexion NT   Knee extension NT   Knee flexion NT   Ankle plantarflexion NT   Ankle dorsiflexion NT     - pronator drift: NT  - abnormal movements: none    SENSATION:  - light touch: NT  - vibration (R/L, seconds): NT at the great toes  - pinprick: NT  - proprioception: NT  - Romberg: absent    COORDINATION:  - finger to nose: NT  - finger tapping: NT    REFLEXES:  Reflex Right Left   BR NT NT   Biceps NT NT   Triceps NT NT   Patellae NT NT   Achilles NT NT   Toes NT NT     GAIT:  - NT    REVIEW OF IMAGING STUDIES:  No additional images since the last visit.    REVIEW OF LABORATORY STUDIES:  No additional data since the last visit.    ASSESSMENT:  Marcela Tiwari is a 48 y.o. woman with chronic migraine.  Plans/recommendations as follows:    PLAN:  Chronic Migraine:  Prevention:  - continue Botox  - continue Emgality (can try pausing this to see if it's still needed)  - continue supplementing:   - magnesium: 400-600 mg once or 200-300 mg twice daily  - get 7-9 hours of sleep per night  - drink plenty of fluids (urine  should be nearly clear)  - avoid excessive caffeine intake (no more than 2 servings per day and nothing in the afternoon)  - eat regular meals (don't skip meals)  - get moderate exercise (even just a 20 minute walk daily)    :  - trial of Ubrelvy  - continue sumatriptan 50 mg PO: take this at the onset of headache pain; may re-dose x1 after 2 hours if headache persists; MDD: 200 mg/24 hours do not use more than 2 days/week OR  - continue sumatriptan 20 mg IN: take this at the onset of headache; may re-dose x1 after 2 hours if headache persists; MDD: 40 mg/24 hours  - if headaches to not resolve with the above measures, can dose ketorolac 60 mg/day at home (try to limit to 1 dose/month)  - do not use analgesics (e.g., ibuprofen, acetaminophen) more than 2 days per week in order to avoid analgesic rebound headaches    - keep a headache log    Follow-Up:  - Return in about 6 months (around 2025).    Signed: Caesar Vigil M.D.

## 2025-05-06 ENCOUNTER — TELEPHONE (OUTPATIENT)
Dept: PHARMACY | Facility: MEDICAL CENTER | Age: 50
End: 2025-05-06
Payer: COMMERCIAL

## 2025-05-06 RX ORDER — KETOROLAC TROMETHAMINE 30 MG/ML
60 INJECTION, SOLUTION INTRAMUSCULAR; INTRAVENOUS
Qty: 2 ML | Refills: 11 | Status: SHIPPED | OUTPATIENT
Start: 2025-05-06 | End: 2026-05-06

## 2025-05-06 NOTE — TELEPHONE ENCOUNTER
Please see note from pharmacy     Please clarify the quantity prescribed for this prescription. 20 ml but for one dose would only need 2ml, please confirm

## 2025-05-06 NOTE — TELEPHONE ENCOUNTER
Prior Authorization for Ubrogepant 100 MG Tab  (Quantity: 8, Days: 30) has been submitted via Cover My Meds: Key ([id number])    Insurance: [facility] Personal Care    Will follow up in 24-48 business hours.

## 2025-05-06 NOTE — TELEPHONE ENCOUNTER
Received New Start PA request via MSOT  for   Ubrogepant 100 MG Tab. (Quantity:8, Day Supply:30)     Insurance: [facility] Personal Care  Member ID:  [id number]  BIN: [id number]  PCN: [id number]  Group: [id number]     Ran Test claim via [place] & medication Rejects stating prior authorization is required.

## 2025-05-07 NOTE — TELEPHONE ENCOUNTER
Prior Authorization for Ubrogepant 100 MG Tab  has been approved for a quantity of 8 , day supply 30    Prior Authorization reference number:  [id number]  Insurance: [employer] Personal Care  Effective dates: 5/6/25 to 8/6/25  Copay: $0     Is patient eligible to fill with [facility]? Yes    Next Steps: The Patients copay is less than $5.00. Will contact the patient to determine choice of pharmacy, if applicable.

## 2025-05-16 ENCOUNTER — HOSPITAL ENCOUNTER (OUTPATIENT)
Dept: RADIOLOGY | Facility: MEDICAL CENTER | Age: 50
End: 2025-05-16
Attending: PSYCHIATRY & NEUROLOGY
Payer: COMMERCIAL

## 2025-05-16 DIAGNOSIS — G43.E09 CHRONIC MIGRAINE WITH AURA WITHOUT STATUS MIGRAINOSUS, NOT INTRACTABLE: ICD-10-CM

## 2025-05-16 DIAGNOSIS — G43.901 STATUS MIGRAINOSUS: ICD-10-CM

## 2025-05-16 PROCEDURE — 70551 MRI BRAIN STEM W/O DYE: CPT

## 2025-05-26 ENCOUNTER — OFFICE VISIT (OUTPATIENT)
Dept: URGENT CARE | Facility: CLINIC | Age: 50
End: 2025-05-26
Payer: COMMERCIAL

## 2025-05-26 VITALS
OXYGEN SATURATION: 98 % | TEMPERATURE: 98.9 F | SYSTOLIC BLOOD PRESSURE: 100 MMHG | BODY MASS INDEX: 28.59 KG/M2 | HEART RATE: 80 BPM | DIASTOLIC BLOOD PRESSURE: 62 MMHG | HEIGHT: 71 IN | WEIGHT: 204.2 LBS | RESPIRATION RATE: 18 BRPM

## 2025-05-26 DIAGNOSIS — B37.2 CANDIDAL DERMATITIS: ICD-10-CM

## 2025-05-26 DIAGNOSIS — J06.9 UPPER RESPIRATORY TRACT INFECTION, UNSPECIFIED TYPE: Primary | ICD-10-CM

## 2025-05-26 PROCEDURE — 3074F SYST BP LT 130 MM HG: CPT | Performed by: NURSE PRACTITIONER

## 2025-05-26 PROCEDURE — 3078F DIAST BP <80 MM HG: CPT | Performed by: NURSE PRACTITIONER

## 2025-05-26 PROCEDURE — 99213 OFFICE O/P EST LOW 20 MIN: CPT | Performed by: NURSE PRACTITIONER

## 2025-05-26 RX ORDER — KETOCONAZOLE 20 MG/G
1 CREAM TOPICAL DAILY
Qty: 15 G | Refills: 0 | Status: SHIPPED | OUTPATIENT
Start: 2025-05-26

## 2025-05-26 RX ORDER — FLUCONAZOLE 150 MG/1
150 TABLET ORAL DAILY
Qty: 1 TABLET | Refills: 0 | Status: SHIPPED | OUTPATIENT
Start: 2025-05-26

## 2025-05-26 ASSESSMENT — ENCOUNTER SYMPTOMS
SORE THROAT: 1
COUGH: 0
HOARSE VOICE: 1

## 2025-05-26 ASSESSMENT — FIBROSIS 4 INDEX: FIB4 SCORE: 0.7

## 2025-05-26 NOTE — PROGRESS NOTES
Subjective     Marcela Tiwari is a 49 y.o. female who presents with Pharyngitis (ear pain, loss of voice and cough. X 4 days )            Pharyngitis   This is a new problem. Episode onset: pt reports new onset of not feeling well, loss of voice, left ear pain and fatigue that started 4 days ago. her throat also feels sore. no fevers. Associated symptoms include a hoarse voice. Pertinent negatives include no congestion or coughing. Treatments tried: dayquil and nyquil. The treatment provided no relief.   Rash  This is a new problem. Episode onset: pt reports new onset of rash behind her left knee where she has a below the knee amputation. reports the area behind her knee is moist most of the day because she has to wear a sleeve anabel her prosthetic. the redness is spreading and almost feels tender. Associated symptoms include a sore throat. Pertinent negatives include no congestion or cough.       Review of Systems   Constitutional:  Positive for malaise/fatigue.   HENT:  Positive for hoarse voice and sore throat. Negative for congestion.    Respiratory:  Negative for cough.    Skin:  Positive for rash.   All other systems reviewed and are negative.         Past Medical History[1] Past Surgical History[2]   Social History     Socioeconomic History    Marital status:      Spouse name: Not on file    Number of children: Not on file    Years of education: Not on file    Highest education level: Not on file   Occupational History    Not on file   Tobacco Use    Smoking status: Never    Smokeless tobacco: Never   Vaping Use    Vaping status: Never Used   Substance and Sexual Activity    Alcohol use: Not Currently     Alcohol/week: 2.0 oz     Types: 4 Glasses of wine per week     Comment: occ    Drug use: No    Sexual activity: Yes     Partners: Male     Birth control/protection: None   Other Topics Concern    Not on file   Social History Narrative    Works as a  at the [employer]. Has a  "4 year old daughter.      Social Drivers of Health     Financial Resource Strain: Not on file   Food Insecurity: Not on file   Transportation Needs: Not on file   Physical Activity: Not on file   Stress: Not on file   Social Connections: Not on file   Intimate Partner Violence: Not on file   Housing Stability: Not on file         Objective     /62 (BP Location: Right arm, Patient Position: Sitting, BP Cuff Size: Adult)   Pulse 80   Temp 37.2 °C (98.9 °F) (Temporal)   Resp 18   Ht 1.803 m (5' 11\")   Wt 92.6 kg (204 lb 3.2 oz)   SpO2 98%   BMI 28.48 kg/m²      Physical Exam  Vitals and nursing note reviewed.   Constitutional:       Appearance: Normal appearance. She is normal weight.   HENT:      Head: Normocephalic and atraumatic.      Right Ear: Tympanic membrane and external ear normal.      Left Ear: Tympanic membrane and external ear normal.      Nose: Nose normal.      Mouth/Throat:      Mouth: Mucous membranes are moist.      Pharynx: Oropharynx is clear. No posterior oropharyngeal erythema.      Comments: Voice hoarseness  Eyes:      Extraocular Movements: Extraocular movements intact.      Pupils: Pupils are equal, round, and reactive to light.   Cardiovascular:      Rate and Rhythm: Normal rate and regular rhythm.   Pulmonary:      Effort: Pulmonary effort is normal.   Musculoskeletal:         General: Normal range of motion.      Cervical back: Normal range of motion.        Legs:    Skin:     General: Skin is warm and dry.      Capillary Refill: Capillary refill takes less than 2 seconds.   Neurological:      General: No focal deficit present.      Mental Status: She is alert and oriented to person, place, and time. Mental status is at baseline.   Psychiatric:         Mood and Affect: Mood normal.         Speech: Speech normal.         Thought Content: Thought content normal.         Judgment: Judgment normal.                                  Assessment & Plan  Upper respiratory tract " infection, unspecified type    Orders:    amoxicillin-clavulanate (AUGMENTIN) 875-125 MG Tab; Take 1 Tablet by mouth 2 times a day for 7 days.    Candidal dermatitis    Orders:    ketoconazole (NIZORAL) 2 % Cream; Apply 1 Application topically every day.    fluconazole (DIFLUCAN) 150 MG tablet; Take 1 Tablet by mouth every day.       Take full course of oral abx as directed  She uses flonase sprays daily, continue this  She can still use dayquil as needed for URI symptoms  Please take diflucan and use cream as directed  Try to keep skin dry and open to air as much as possible to help clear rash  She understands  Supportive care, differential diagnoses, and indications for immediate follow-up discussed with patient.    Pathogenesis of diagnosis discussed including typical length and natural progression.    Instructed to return to  or nearest emergency department if symptoms fail to improve, for any change in condition, further concerns, or new concerning symptoms.  Patient states understanding of the plan of care and discharge instructions.               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    Headache, classical migraine    [2]   Past Surgical History:  Procedure Laterality Date    OTHER  2019    sinus surgery    AMPUTATION, BELOW THE KNEE  2011    BUNIONECTOMY

## 2025-06-26 NOTE — Clinical Note
REFERRAL APPROVAL NOTICE         Sent on June 26, 2025                   Marcela Tiwari  [address] [place] [address]                   Dear Ms. Karie,    After a careful review of the medical information and benefit coverage, [facility] has processed your referral. See below for additional details.    If applicable, you must be actively enrolled with your insurance for coverage of the authorized service. If you have any questions regarding your coverage, please contact your insurance directly.    REFERRAL INFORMATION   Referral #:  [id number]  Referred-To Department    Referred-By Provider:  Neurology    Caesar Vigil M.D.   Neurology [facility]      [address]  [place] [id number] [phone] [address] [address] [facility] [address]  [place] [id number] [phone]    Referral Start Date:  06/26/2025  Referral End Date:   06/26/2026           SCHEDULING  If you do not already have an appointment, please call [phone] to make an appointment.   MORE INFORMATION  As a reminder, [facility] ownership has changed, meaning this location is now owned and operated by [facility]. As such, we want to clarify that our patients should expect to receive two separate bills for the services received at [facility] - one representing the [facility] facility fees as the owner of the establishment, and the other to represent the physician's services and subsequent fees. You can speak with your insurance carrier for a pricing estimate by calling the customer service number on the back of your card and ask about charges for a hospital outpatient visit.  If you do not already have a [facility] account, sign up at: [email or web address]  You can access your medical information, make appointments, see lab results, billing information, and more.  If you have questions regarding this referral, please contact  the [facility] Referrals department at:             [phone]. Monday -  Friday 7:30AM - 5:00PM.      Sincerely,  [facility]

## 2025-06-27 ENCOUNTER — HOSPITAL ENCOUNTER (OUTPATIENT)
Dept: LAB | Facility: MEDICAL CENTER | Age: 50
End: 2025-06-27
Attending: INTERNAL MEDICINE
Payer: COMMERCIAL

## 2025-06-27 ENCOUNTER — HOSPITAL ENCOUNTER (OUTPATIENT)
Dept: LAB | Facility: MEDICAL CENTER | Age: 50
End: 2025-06-27
Attending: STUDENT IN AN ORGANIZED HEALTH CARE EDUCATION/TRAINING PROGRAM
Payer: COMMERCIAL

## 2025-06-27 LAB
25(OH)D3 SERPL-MCNC: 50 NG/ML (ref 30–100)
ALBUMIN SERPL BCP-MCNC: 4.5 G/DL (ref 3.2–4.9)
ALBUMIN/GLOB SERPL: 1.6 G/DL
ALP SERPL-CCNC: 46 U/L (ref 30–99)
ALT SERPL-CCNC: 18 U/L (ref 2–50)
ANION GAP SERPL CALC-SCNC: 9 MMOL/L (ref 7–16)
AST SERPL-CCNC: 16 U/L (ref 12–45)
BASOPHILS # BLD AUTO: 0.5 % (ref 0–1.8)
BASOPHILS # BLD: 0.03 K/UL (ref 0–0.12)
BILIRUB SERPL-MCNC: 0.4 MG/DL (ref 0.1–1.5)
BUN SERPL-MCNC: 16 MG/DL (ref 8–22)
CALCIUM ALBUM COR SERPL-MCNC: 9 MG/DL (ref 8.5–10.5)
CALCIUM SERPL-MCNC: 9.4 MG/DL (ref 8.5–10.5)
CHLORIDE SERPL-SCNC: 103 MMOL/L (ref 96–112)
CHOLEST SERPL-MCNC: 173 MG/DL (ref 100–199)
CO2 SERPL-SCNC: 25 MMOL/L (ref 20–33)
CREAT SERPL-MCNC: 0.76 MG/DL (ref 0.5–1.4)
EOSINOPHIL # BLD AUTO: 0.18 K/UL (ref 0–0.51)
EOSINOPHIL NFR BLD: 3.3 % (ref 0–6.9)
ERYTHROCYTE [DISTWIDTH] IN BLOOD BY AUTOMATED COUNT: 43.5 FL (ref 35.9–50)
EST. AVERAGE GLUCOSE BLD GHB EST-MCNC: 111 MG/DL
FERRITIN SERPL-MCNC: 52.4 NG/ML (ref 10–291)
FOLATE SERPL-MCNC: 10.6 NG/ML
GFR SERPLBLD CREATININE-BSD FMLA CKD-EPI: 97 ML/MIN/1.73 M 2
GLOBULIN SER CALC-MCNC: 2.9 G/DL (ref 1.9–3.5)
GLUCOSE SERPL-MCNC: 89 MG/DL (ref 65–99)
HBA1C MFR BLD: 5.5 % (ref 4–5.6)
HCT VFR BLD AUTO: 42.4 % (ref 37–47)
HDLC SERPL-MCNC: 53 MG/DL
HGB BLD-MCNC: 13.6 G/DL (ref 12–16)
IMM GRANULOCYTES # BLD AUTO: 0.01 K/UL (ref 0–0.11)
IMM GRANULOCYTES NFR BLD AUTO: 0.2 % (ref 0–0.9)
IRON SATN MFR SERPL: 40 % (ref 15–55)
IRON SERPL-MCNC: 100 UG/DL (ref 40–170)
LDLC SERPL CALC-MCNC: 108 MG/DL
LYMPHOCYTES # BLD AUTO: 1.94 K/UL (ref 1–4.8)
LYMPHOCYTES NFR BLD: 35.2 % (ref 22–41)
MCH RBC QN AUTO: 29.9 PG (ref 27–33)
MCHC RBC AUTO-ENTMCNC: 32.1 G/DL (ref 32.2–35.5)
MCV RBC AUTO: 93.2 FL (ref 81.4–97.8)
MONOCYTES # BLD AUTO: 0.3 K/UL (ref 0–0.85)
MONOCYTES NFR BLD AUTO: 5.4 % (ref 0–13.4)
NEUTROPHILS # BLD AUTO: 3.05 K/UL (ref 1.82–7.42)
NEUTROPHILS NFR BLD: 55.4 % (ref 44–72)
NRBC # BLD AUTO: 0 K/UL
NRBC BLD-RTO: 0 /100 WBC (ref 0–0.2)
PLATELET # BLD AUTO: 285 K/UL (ref 164–446)
PMV BLD AUTO: 9.9 FL (ref 9–12.9)
POTASSIUM SERPL-SCNC: 4 MMOL/L (ref 3.6–5.5)
PROGEST SERPL-MCNC: 1.83 NG/ML
PROT SERPL-MCNC: 7.4 G/DL (ref 6–8.2)
RBC # BLD AUTO: 4.55 M/UL (ref 4.2–5.4)
SODIUM SERPL-SCNC: 137 MMOL/L (ref 135–145)
TIBC SERPL-MCNC: 249 UG/DL (ref 250–450)
TRIGL SERPL-MCNC: 60 MG/DL (ref 0–149)
TSH SERPL DL<=0.005 MIU/L-ACNC: 1.08 UIU/ML (ref 0.38–5.33)
UIBC SERPL-MCNC: 149 UG/DL (ref 110–370)
VIT B12 SERPL-MCNC: 446 PG/ML (ref 211–911)
WBC # BLD AUTO: 5.5 K/UL (ref 4.8–10.8)

## 2025-06-27 PROCEDURE — 82746 ASSAY OF FOLIC ACID SERUM: CPT

## 2025-06-27 PROCEDURE — 84402 ASSAY OF FREE TESTOSTERONE: CPT

## 2025-06-27 PROCEDURE — 83036 HEMOGLOBIN GLYCOSYLATED A1C: CPT

## 2025-06-27 PROCEDURE — 36415 COLL VENOUS BLD VENIPUNCTURE: CPT

## 2025-06-27 PROCEDURE — 82607 VITAMIN B-12: CPT

## 2025-06-27 PROCEDURE — 80061 LIPID PANEL: CPT

## 2025-06-27 PROCEDURE — 82728 ASSAY OF FERRITIN: CPT

## 2025-06-27 PROCEDURE — 83550 IRON BINDING TEST: CPT

## 2025-06-27 PROCEDURE — 85025 COMPLETE CBC W/AUTO DIFF WBC: CPT

## 2025-06-27 PROCEDURE — 84443 ASSAY THYROID STIM HORMONE: CPT

## 2025-06-27 PROCEDURE — 84270 ASSAY OF SEX HORMONE GLOBUL: CPT

## 2025-06-27 PROCEDURE — 82306 VITAMIN D 25 HYDROXY: CPT

## 2025-06-27 PROCEDURE — 80053 COMPREHEN METABOLIC PANEL: CPT

## 2025-06-27 PROCEDURE — 83540 ASSAY OF IRON: CPT

## 2025-06-27 PROCEDURE — 84403 ASSAY OF TOTAL TESTOSTERONE: CPT

## 2025-06-27 PROCEDURE — 82671 ASSAY OF ESTROGENS: CPT

## 2025-06-27 PROCEDURE — 84144 ASSAY OF PROGESTERONE: CPT

## 2025-07-01 ENCOUNTER — OFFICE VISIT (OUTPATIENT)
Dept: NEUROLOGY | Facility: MEDICAL CENTER | Age: 50
End: 2025-07-01
Attending: PSYCHIATRY & NEUROLOGY
Payer: COMMERCIAL

## 2025-07-01 VITALS
SYSTOLIC BLOOD PRESSURE: 102 MMHG | BODY MASS INDEX: 28.88 KG/M2 | HEART RATE: 86 BPM | TEMPERATURE: 98.2 F | OXYGEN SATURATION: 95 % | DIASTOLIC BLOOD PRESSURE: 68 MMHG | RESPIRATION RATE: 16 BRPM | HEIGHT: 70 IN | WEIGHT: 201.72 LBS

## 2025-07-01 DIAGNOSIS — G43.E09 CHRONIC MIGRAINE WITH AURA WITHOUT STATUS MIGRAINOSUS, NOT INTRACTABLE: Primary | ICD-10-CM

## 2025-07-01 PROCEDURE — 64615 CHEMODENERV MUSC MIGRAINE: CPT

## 2025-07-01 PROCEDURE — 3074F SYST BP LT 130 MM HG: CPT | Performed by: PSYCHIATRY & NEUROLOGY

## 2025-07-01 PROCEDURE — 700101 HCHG RX REV CODE 250

## 2025-07-01 PROCEDURE — 3078F DIAST BP <80 MM HG: CPT | Performed by: PSYCHIATRY & NEUROLOGY

## 2025-07-01 PROCEDURE — 64615 CHEMODENERV MUSC MIGRAINE: CPT | Performed by: PSYCHIATRY & NEUROLOGY

## 2025-07-01 PROCEDURE — 700111 HCHG RX REV CODE 636 W/ 250 OVERRIDE (IP): Mod: JZ

## 2025-07-01 RX ADMIN — SODIUM CHLORIDE 155 UNITS: 9 INJECTION, SOLUTION INTRAMUSCULAR; INTRAVENOUS; SUBCUTANEOUS at 09:12

## 2025-07-01 ASSESSMENT — FIBROSIS 4 INDEX: FIB4 SCORE: 0.62

## 2025-07-01 ASSESSMENT — PATIENT HEALTH QUESTIONNAIRE - PHQ9: CLINICAL INTERPRETATION OF PHQ2 SCORE: 0

## 2025-07-02 LAB
ESTRADIOL SERPL HS-MCNC: 49.5 PG/ML
ESTROGEN SERPL CALC-MCNC: 86.9 PG/ML
ESTRONE SERPL-MCNC: 37.4 PG/ML

## 2025-07-03 LAB
SHBG SERPL-SCNC: 55 NMOL/L (ref 25–122)
TESTOST FREE SERPL-MCNC: 8 PG/ML (ref 1.1–5.8)
TESTOST SERPL-MCNC: 65 NG/DL (ref 9–55)

## 2025-07-22 ENCOUNTER — APPOINTMENT (OUTPATIENT)
Dept: NEUROLOGY | Facility: MEDICAL CENTER | Age: 50
End: 2025-07-22
Attending: PSYCHIATRY & NEUROLOGY
Payer: COMMERCIAL

## 2025-07-24 ENCOUNTER — TELEPHONE (OUTPATIENT)
Dept: HEALTH INFORMATION MANAGEMENT | Facility: OTHER | Age: 50
End: 2025-07-24
Payer: COMMERCIAL